# Patient Record
Sex: MALE | Race: WHITE
[De-identification: names, ages, dates, MRNs, and addresses within clinical notes are randomized per-mention and may not be internally consistent; named-entity substitution may affect disease eponyms.]

---

## 2017-04-25 ENCOUNTER — HOSPITAL ENCOUNTER (INPATIENT)
Age: 62
LOS: 3 days | Discharge: HOME | DRG: 189 | End: 2017-04-28
Payer: MEDICAID

## 2017-04-25 DIAGNOSIS — J96.00: Primary | ICD-10-CM

## 2017-04-25 DIAGNOSIS — J20.9: ICD-10-CM

## 2017-04-25 DIAGNOSIS — Z99.81: ICD-10-CM

## 2017-04-25 DIAGNOSIS — Z80.1: ICD-10-CM

## 2017-04-25 DIAGNOSIS — J18.1: ICD-10-CM

## 2017-04-25 DIAGNOSIS — J44.0: ICD-10-CM

## 2017-04-25 DIAGNOSIS — I10: ICD-10-CM

## 2017-04-25 DIAGNOSIS — Z79.899: ICD-10-CM

## 2017-04-25 DIAGNOSIS — Z79.891: ICD-10-CM

## 2017-04-25 DIAGNOSIS — M51.36: ICD-10-CM

## 2017-04-25 DIAGNOSIS — J44.1: ICD-10-CM

## 2017-04-25 DIAGNOSIS — J98.9: ICD-10-CM

## 2017-04-25 DIAGNOSIS — F17.210: ICD-10-CM

## 2017-05-26 ENCOUNTER — HOSPITAL ENCOUNTER (OUTPATIENT)
Age: 62
Discharge: HOME | End: 2017-05-26
Payer: MEDICAID

## 2017-05-26 DIAGNOSIS — J44.9: Primary | ICD-10-CM

## 2017-06-22 ENCOUNTER — HOSPITAL ENCOUNTER (OUTPATIENT)
Dept: HOSPITAL 76 - LAB.F | Age: 62
Discharge: HOME | End: 2017-06-22
Attending: NURSE PRACTITIONER
Payer: MEDICAID

## 2017-06-22 DIAGNOSIS — M25.551: Primary | ICD-10-CM

## 2017-06-22 PROCEDURE — 87640 STAPH A DNA AMP PROBE: CPT

## 2017-06-22 PROCEDURE — 87081 CULTURE SCREEN ONLY: CPT

## 2017-07-10 ENCOUNTER — HOSPITAL ENCOUNTER (INPATIENT)
Dept: HOSPITAL 76 - MS | Age: 62
LOS: 9 days | Discharge: NURSING, REHAB OR LONG-TERM CARE | DRG: 469 | End: 2017-07-19
Attending: INTERNAL MEDICINE | Admitting: ORTHOPAEDIC SURGERY
Payer: MEDICAID

## 2017-07-10 DIAGNOSIS — I26.99: ICD-10-CM
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
## 2017-07-10 DIAGNOSIS — Z79.1: ICD-10-CM

## 2017-07-10 DIAGNOSIS — Z87.891: ICD-10-CM

## 2017-07-10 DIAGNOSIS — E87.5: ICD-10-CM

## 2017-07-10 DIAGNOSIS — M16.11: Primary | ICD-10-CM

## 2017-07-10 DIAGNOSIS — J44.1: ICD-10-CM

## 2017-07-10 DIAGNOSIS — T81.718A: ICD-10-CM
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
## 2017-07-10 DIAGNOSIS — E66.9: ICD-10-CM

## 2017-07-10 DIAGNOSIS — G89.29: ICD-10-CM

## 2017-07-10 DIAGNOSIS — N17.9: ICD-10-CM

## 2017-07-10 DIAGNOSIS — M54.9: ICD-10-CM

## 2017-07-10 DIAGNOSIS — J96.02: ICD-10-CM

## 2017-07-10 DIAGNOSIS — I10: ICD-10-CM

## 2017-07-10 DIAGNOSIS — F41.9: ICD-10-CM

## 2017-07-10 DIAGNOSIS — J96.01: ICD-10-CM

## 2017-07-10 LAB
BASOPHILS NFR BLD AUTO: 0.1 10^3/UL (ref 0–0.1)
BASOPHILS NFR BLD AUTO: 0.1 10^3/UL (ref 0–0.1)
BASOPHILS NFR BLD AUTO: 0.5 %
BASOPHILS NFR BLD AUTO: 0.9 %
EOSINOPHIL # BLD AUTO: 0.1 10^3/UL (ref 0–0.7)
EOSINOPHIL # BLD AUTO: 0.2 10^3/UL (ref 0–0.7)
EOSINOPHIL NFR BLD AUTO: 1.2 %
EOSINOPHIL NFR BLD AUTO: 3 %
ERYTHROCYTE [DISTWIDTH] IN BLOOD BY AUTOMATED COUNT: 14.2 % (ref 12–15)
ERYTHROCYTE [DISTWIDTH] IN BLOOD BY AUTOMATED COUNT: 14.5 % (ref 12–15)
HCT VFR BLD AUTO: 37.2 % (ref 42–52)
HCT VFR BLD AUTO: 37.6 % (ref 42–52)
HGB UR QL STRIP: 12.2 G/DL (ref 14–18)
HGB UR QL STRIP: 12.9 G/DL (ref 14–18)
LYMPHOCYTES # SPEC AUTO: 1.2 10^3/UL (ref 1.5–3.5)
LYMPHOCYTES # SPEC AUTO: 1.5 10^3/UL (ref 1.5–3.5)
LYMPHOCYTES NFR BLD AUTO: 11.7 %
LYMPHOCYTES NFR BLD AUTO: 22.9 %
MCH RBC QN AUTO: 31.8 PG (ref 27–31)
MCH RBC QN AUTO: 32.2 PG (ref 27–31)
MCHC RBC AUTO-ENTMCNC: 32.9 G/DL (ref 32–36)
MCHC RBC AUTO-ENTMCNC: 34.4 G/DL (ref 32–36)
MCV RBC AUTO: 93.8 FL (ref 80–94)
MCV RBC AUTO: 96.8 FL (ref 80–94)
MONOCYTES # BLD AUTO: 0.8 10^3/UL (ref 0–1)
MONOCYTES # BLD AUTO: 1 10^3/UL (ref 0–1)
MONOCYTES NFR BLD AUTO: 12.4 %
MONOCYTES NFR BLD AUTO: 9.6 %
NEUTROPHILS # BLD AUTO: 3.9 10^3/UL (ref 1.5–6.6)
NEUTROPHILS # BLD AUTO: 8.1 10^3/UL (ref 1.5–6.6)
NEUTROPHILS # SNV AUTO: 10.5 X10^3/UL (ref 4.8–10.8)
NEUTROPHILS # SNV AUTO: 6.4 X10^3/UL (ref 4.8–10.8)
NEUTROPHILS NFR BLD AUTO: 60.8 %
NEUTROPHILS NFR BLD AUTO: 77 %
NRBC # BLD AUTO: 0.1 /100WBC
NRBC # BLD AUTO: 0.1 /100WBC
PDW BLD AUTO: 7.6 FL (ref 7.4–11.4)
PDW BLD AUTO: 7.8 FL (ref 7.4–11.4)
RBC MAR: 3.84 10^6/UL (ref 4.7–6.1)
RBC MAR: 4.01 10^6/UL (ref 4.7–6.1)
WBC # BLD: 10.5 X10^3/UL
WBC # BLD: 6.4 X10^3/UL

## 2017-07-10 PROCEDURE — 71010: CPT

## 2017-07-10 PROCEDURE — 83735 ASSAY OF MAGNESIUM: CPT

## 2017-07-10 PROCEDURE — 36415 COLL VENOUS BLD VENIPUNCTURE: CPT

## 2017-07-10 PROCEDURE — 85025 COMPLETE CBC W/AUTO DIFF WBC: CPT

## 2017-07-10 PROCEDURE — 87150 DNA/RNA AMPLIFIED PROBE: CPT

## 2017-07-10 PROCEDURE — 82570 ASSAY OF URINE CREATININE: CPT

## 2017-07-10 PROCEDURE — 82330 ASSAY OF CALCIUM: CPT

## 2017-07-10 PROCEDURE — 82803 BLOOD GASES ANY COMBINATION: CPT

## 2017-07-10 PROCEDURE — 85379 FIBRIN DEGRADATION QUANT: CPT

## 2017-07-10 PROCEDURE — 84300 ASSAY OF URINE SODIUM: CPT

## 2017-07-10 PROCEDURE — 83605 ASSAY OF LACTIC ACID: CPT

## 2017-07-10 PROCEDURE — 0SR902Z REPLACEMENT OF RIGHT HIP JOINT WITH METAL ON POLYETHYLENE SYNTHETIC SUBSTITUTE, OPEN APPROACH: ICD-10-PCS | Performed by: ORTHOPAEDIC SURGERY

## 2017-07-10 PROCEDURE — 94660 CPAP INITIATION&MGMT: CPT

## 2017-07-10 PROCEDURE — 87086 URINE CULTURE/COLONY COUNT: CPT

## 2017-07-10 PROCEDURE — 76770 US EXAM ABDO BACK WALL COMP: CPT

## 2017-07-10 PROCEDURE — 81003 URINALYSIS AUTO W/O SCOPE: CPT

## 2017-07-10 PROCEDURE — 84100 ASSAY OF PHOSPHORUS: CPT

## 2017-07-10 PROCEDURE — 80053 COMPREHEN METABOLIC PANEL: CPT

## 2017-07-10 PROCEDURE — 83880 ASSAY OF NATRIURETIC PEPTIDE: CPT

## 2017-07-10 PROCEDURE — 81001 URINALYSIS AUTO W/SCOPE: CPT

## 2017-07-10 PROCEDURE — 94640 AIRWAY INHALATION TREATMENT: CPT

## 2017-07-10 PROCEDURE — 85610 PROTHROMBIN TIME: CPT

## 2017-07-10 PROCEDURE — 80048 BASIC METABOLIC PNL TOTAL CA: CPT

## 2017-07-10 PROCEDURE — 84484 ASSAY OF TROPONIN QUANT: CPT

## 2017-07-10 PROCEDURE — 36600 WITHDRAWAL OF ARTERIAL BLOOD: CPT

## 2017-07-10 PROCEDURE — 93306 TTE W/DOPPLER COMPLETE: CPT

## 2017-07-10 PROCEDURE — 71275 CT ANGIOGRAPHY CHEST: CPT

## 2017-07-10 RX ADMIN — SODIUM CHLORIDE SCH MLS/HR: 4.5 INJECTION, SOLUTION INTRAVENOUS at 18:31

## 2017-07-10 RX ADMIN — SODIUM CHLORIDE, PRESERVATIVE FREE SCH: 5 INJECTION INTRAVENOUS at 22:28

## 2017-07-10 RX ADMIN — HYDROCODONE BITARTRATE AND ACETAMINOPHEN PRN TAB: 5; 325 TABLET ORAL at 23:59

## 2017-07-10 RX ADMIN — CEFAZOLIN SODIUM SCH MLS/HR: 2 SOLUTION INTRAVENOUS at 20:14

## 2017-07-10 RX ADMIN — GABAPENTIN SCH MG: 300 CAPSULE ORAL at 20:14

## 2017-07-10 RX ADMIN — HYDROCODONE BITARTRATE AND ACETAMINOPHEN PRN TAB: 5; 325 TABLET ORAL at 20:14

## 2017-07-10 RX ADMIN — IPRATROPIUM BROMIDE AND ALBUTEROL SULFATE SCH ML: 2.5; .5 SOLUTION RESPIRATORY (INHALATION) at 20:00

## 2017-07-10 NOTE — OPERATIVE REPORT
Operative Report





- General


Admit Date: 07/10/17


Procedure Date: 07/10/17


Planned Procedure: Right Total Hip Arthroplasty via Anterolateral Approach


Pre-Op Diagnosis: Right Hip Osteoarthritis


Post Op Diagnosis: Same





- Procedure Note


Primary Surgeon: Sandeep Almeida MD


Anesthesia Provider: MD Marisol


Anesthesia Technique: Local, Moderate sedation, Spinal


Pathology: 





Same


Estimated Blood Loss (in cc): 300


Complications: 





None.





- Other


Other Information/Narrative: 





Implants:


   Claudia Taperloc


      Taperloc Femoral Stem, Standard Offset, Size 12 x 148 mm, porous coated.


      58 mm porous coated Acetabular Shell


      UHMWPE Acetabular Cup, 40 mm ID.


      Screws: 6.5 x 25 mm


                  6.5 x 20 mm


       Chrome Cobalt Femoral Head, +3 x 40 mm OD.


Fluids: 1750 mL LR


Urine: 250 mL


Condition: Stable


Disposition: PACU >> MedSurg

## 2017-07-10 NOTE — XRAY REPORT
EXAM:

RIGHT HIP AND PELVIS RADIOGRAPHY

 

EXAM DATE: 7/10/2017 05:40 PM.

 

HISTORY: Status post Right Total Hip Arthroplasty.

 

COMPARISONS: None.

 

TECHNIQUE: 1 view of the pelvis and 1 view of the hip. Iliac crests not included. Suboptimal detail d
ue to portable technique.

 

FINDINGS: 

Bones: Grossly unremarkable. No definite fracture.

 

Joints: Right total hip prosthesis in anatomic alignment. No abnormal lucency associated with the pro
sthesis. Left hip joint not well visualized.

 

Soft Tissues: Unremarkable.

 

IMPRESSION: Status post right THR.

 

[facility]

Referring Provider Line: [phone]

 

SITE ID: [id number]

## 2017-07-10 NOTE — CONSULTATION NOTE
DATE OF CONSULTATION:  07/10/2017 00:00:00

 

REQUESTING PROVIDER:  Dr. Sandeep Almeida, Orthopaedic Surgery. 

 

PRIMARY CARE PROVIDER: VALERIE Kramer.

 

CHIEF COMPLAINT: Right total hip replacement. 

 

HISTORY OF PRESENT ILLNESS: This is a 62-year-old male whose past medical history is significant for 
chronic obstructive pulmonary disease. He does use some inhalers at home, is not O2 dependent. He may
 have MAURICIO, but he has not been evaluated and had the study for this as of yet. He did quit smoking 3 
months ago. Alcohol none. He was hospitalized here for a chronic obstructive pulmonary disease exacer
bation 2017. 

 

PAST MEDICAL HISTORY

1. Chronic back pain.

2. Right hip osteoarthritis.

3. History of hypertension.

4. History of chronic obstructive pulmonary disease, not O2 or steroid dependent. 

 

MEDICATIONS:

1. Ibuprofen 800 mg p.o. q. 8 hrs p.r.n.

2. Albuterol HFA MDI 1-2 puffs inhaled q. 4h p.r.n. wheezing.

3. Flexeril 10 mg p.o. t.i.d. p.r.n. muscle spasm.

4. BREO/ELLIPTA 100/25, 1 puff inhaled daily.

5. Gabapentin 300 mg p.o. b.i.d.

6. Vicodin 5/325, 1 tablet p.o. q. 4 hours p.r.n. pain.

7. Hydroxyzine 50 mg p.o. q. 4 hours p.r.n. allergies.

8. Lisinopril 40 mg p.o. daily. 

 

ALLERGIES: NO KNOWN DRUG ALLERGIES. 

 

SOCIAL HISTORY: Alcohol none, smoking-quit 3 months ago, was a 2-pack per day smoker. 

 

FAMILY MEDICAL HISTORY: Positive for lung cancer in mom, alcoholism and liver failure in his father, 
both  in their 50's. 

 

REVIEW OF SYSTEMS: Denies any fever or chills, denies cough. Denies any chest pain. All other review 
of systems are reviewed and are negative except for as in the HPI. 

 

PHYSICAL EXAMINATION:

VITAL SIGNS: Temperature is afebrile, heart rate 79, blood pressure 115/80, respiratory rate 16, 2 li
ters nasal cannula O2 saturation 95%.

CONSTITUTIONAL: Middle aged male in no acute distress. He is obese with a weight of 115.7 kg.

HEENT: Head normocephalic, atraumatic. Eyes PERRLA-DC, EOMI. Mouth no lesions.

NECK: No adenopathy. Carotids 2+/4 without bruits.

CHEST: Chest reveals some scattered expiratory wheezes.

COR: Regular rate and rhythm. S1 and S2 without murmur.

ABDOMEN: Soft, nontender. Bowel sounds present.

EXTREMITIES: Extremity exam reveals SCD's are present.

PSYCH: Mood and affect are appropriate.

SKIN: Skin reveals no rashes.

NEURO: Alert and oriented x3. Motor strength is intact bilaterally. 

 

LABS: White count 6.4, hematocrit 37.6, MCV 93.8, platelets 267, with poly's 3.9. 

 

ASSESSMENT AND PLAN:

1. Postoperative right hip replacement, postop day 0. Further recommendations per orthopedic surgery.


2. History of chronic obstructive pulmonary disease, currently stable. Will resume inhalers. Will als
o order Albuterol nebulizers p.r.n. and Duoneb's q.i.d.

3. Hypertension, essential, present on admission. Continue with his home medication regimen.

4. DVT prophylaxis. Further recommendations per orthopaedic surgery. 

 

TIME SPENT: 60 minutes.

 

 

 

DD:07/10/2017 18:37:00  DT: 07/10/2017 19:21  JOB #: [id number]  EXT JOB #:[id number]

## 2017-07-10 NOTE — XRAY PRELIMINARY REPORT
Accession: [id number]

Exam: XR Hip w/Pelvis 2-3V RT

 

IMPRESSION: Status post right THR.

 

[facility]

 

SITE ID: [id number]

## 2017-07-11 LAB
ALBUMIN/GLOB SERPL: 1.1 {RATIO} (ref 1–2.2)
ANION GAP SERPL CALCULATED.4IONS-SCNC: 6 MMOL/L (ref 6–13)
ANION GAP SERPL CALCULATED.4IONS-SCNC: 8 MMOL/L (ref 6–13)
BASOPHILS NFR BLD AUTO: 0.1 10^3/UL (ref 0–0.1)
BASOPHILS NFR BLD AUTO: 0.1 10^3/UL (ref 0–0.1)
BASOPHILS NFR BLD AUTO: 0.6 %
BASOPHILS NFR BLD AUTO: 0.7 %
BILIRUB BLD-MCNC: 0.6 MG/DL (ref 0.2–1)
BUN SERPL-MCNC: 30 MG/DL (ref 6–20)
BUN SERPL-MCNC: 47 MG/DL (ref 6–20)
CA-I SERPL ISE-MCNC: 1.12 MMOL/L (ref 1.15–1.33)
CALCIUM UR-MCNC: 7.9 MG/DL (ref 8.5–10.3)
CALCIUM UR-MCNC: 8.5 MG/DL (ref 8.5–10.3)
CHLORIDE SERPL-SCNC: 99 MMOL/L (ref 101–111)
CHLORIDE SERPL-SCNC: 99 MMOL/L (ref 101–111)
CO2 SERPL-SCNC: 29 MMOL/L (ref 21–32)
CO2 SERPL-SCNC: 30 MMOL/L (ref 21–32)
CREAT SERPLBLD-SCNC: 1 MG/DL (ref 0.6–1.2)
CREAT SERPLBLD-SCNC: 2.2 MG/DL (ref 0.6–1.2)
CUL URINE ADD CHARGE: (no result)
EOSINOPHIL # BLD AUTO: 0.1 10^3/UL (ref 0–0.7)
EOSINOPHIL # BLD AUTO: 0.1 10^3/UL (ref 0–0.7)
EOSINOPHIL NFR BLD AUTO: 1.1 %
EOSINOPHIL NFR BLD AUTO: 1.1 %
ERYTHROCYTE [DISTWIDTH] IN BLOOD BY AUTOMATED COUNT: 14.5 % (ref 12–15)
ERYTHROCYTE [DISTWIDTH] IN BLOOD BY AUTOMATED COUNT: 14.5 % (ref 12–15)
GFRSERPLBLD MDRD-ARVRAT: 30 ML/MIN/{1.73_M2} (ref 89–?)
GFRSERPLBLD MDRD-ARVRAT: 75 ML/MIN/{1.73_M2} (ref 89–?)
GLOBULIN SER-MCNC: 2.8 G/DL (ref 2.1–4.2)
GLUCOSE SERPL-MCNC: 149 MG/DL (ref 70–100)
GLUCOSE SERPL-MCNC: 152 MG/DL (ref 70–100)
HCT VFR BLD AUTO: 32.1 % (ref 42–52)
HCT VFR BLD AUTO: 35.1 % (ref 42–52)
HGB UR QL STRIP: 10.8 G/DL (ref 14–18)
HGB UR QL STRIP: 11.8 G/DL (ref 14–18)
LYMPHOCYTES # SPEC AUTO: 0.8 10^3/UL (ref 1.5–3.5)
LYMPHOCYTES # SPEC AUTO: 0.8 10^3/UL (ref 1.5–3.5)
LYMPHOCYTES NFR BLD AUTO: 9.1 %
LYMPHOCYTES NFR BLD AUTO: 9.8 %
MAGNESIUM SERPL-MCNC: 2 MG/DL (ref 1.7–2.8)
MCH RBC QN AUTO: 32.5 PG (ref 27–31)
MCH RBC QN AUTO: 32.8 PG (ref 27–31)
MCHC RBC AUTO-ENTMCNC: 33.5 G/DL (ref 32–36)
MCHC RBC AUTO-ENTMCNC: 33.5 G/DL (ref 32–36)
MCV RBC AUTO: 96.8 FL (ref 80–94)
MCV RBC AUTO: 97.9 FL (ref 80–94)
MONOCYTES # BLD AUTO: 0.7 10^3/UL (ref 0–1)
MONOCYTES # BLD AUTO: 1 10^3/UL (ref 0–1)
MONOCYTES NFR BLD AUTO: 11.1 %
MONOCYTES NFR BLD AUTO: 9.7 %
NEUTROPHILS # BLD AUTO: 6.1 10^3/UL (ref 1.5–6.6)
NEUTROPHILS # BLD AUTO: 6.9 10^3/UL (ref 1.5–6.6)
NEUTROPHILS # SNV AUTO: 7.7 X10^3/UL (ref 4.8–10.8)
NEUTROPHILS # SNV AUTO: 8.9 X10^3/UL (ref 4.8–10.8)
NEUTROPHILS NFR BLD AUTO: 78.1 %
NEUTROPHILS NFR BLD AUTO: 78.7 %
NRBC # BLD AUTO: 0 /100WBC
NRBC # BLD AUTO: 0.1 /100WBC
PDW BLD AUTO: 7.8 FL (ref 7.4–11.4)
PDW BLD AUTO: 8.1 FL (ref 7.4–11.4)
PH BLDV: 7.25 [PH] (ref 7.31–7.41)
PH UR STRIP.AUTO: 5.5 PH (ref 5–7.5)
PHOSPHATE BLD-MCNC: 5.8 MG/DL (ref 2.5–4.6)
POTASSIUM SERPL-SCNC: 5.1 MMOL/L (ref 3.5–5)
POTASSIUM SERPL-SCNC: 5.8 MMOL/L (ref 3.5–5)
PROT SPEC-MCNC: 6 G/DL (ref 6.7–8.2)
RBC MAR: 3.32 10^6/UL (ref 4.7–6.1)
RBC MAR: 3.59 10^6/UL (ref 4.7–6.1)
SODIUM SERPLBLD-SCNC: 135 MMOL/L (ref 135–145)
SODIUM SERPLBLD-SCNC: 136 MMOL/L (ref 135–145)
SP GR UR STRIP.AUTO: 1.02 (ref 1–1.03)
UA CHARGE (STRIP ONLY): YES
UA W/ MICROSCOPIC CHARGE: (no result)
UR CULTURE IF IND: (no result)
UROBILINOGEN UR STRIP.AUTO-MCNC: NEGATIVE MG/DL
WBC # BLD: 7.7 X10^3/UL
WBC # BLD: 8.9 X10^3/UL

## 2017-07-11 RX ADMIN — SODIUM CHLORIDE, PRESERVATIVE FREE SCH ML: 5 INJECTION INTRAVENOUS at 16:25

## 2017-07-11 RX ADMIN — OXYCODONE PRN MG: 5 TABLET ORAL at 14:00

## 2017-07-11 RX ADMIN — SODIUM CHLORIDE SCH: 4.5 INJECTION, SOLUTION INTRAVENOUS at 16:28

## 2017-07-11 RX ADMIN — OXYCODONE PRN MG: 5 TABLET ORAL at 18:52

## 2017-07-11 RX ADMIN — CEFAZOLIN SODIUM SCH: 2 SOLUTION INTRAVENOUS at 06:22

## 2017-07-11 RX ADMIN — IPRATROPIUM BROMIDE AND ALBUTEROL SULFATE SCH ML: 2.5; .5 SOLUTION RESPIRATORY (INHALATION) at 15:10

## 2017-07-11 RX ADMIN — IPRATROPIUM BROMIDE AND ALBUTEROL SULFATE SCH ML: 2.5; .5 SOLUTION RESPIRATORY (INHALATION) at 20:22

## 2017-07-11 RX ADMIN — ACETAMINOPHEN SCH: 500 TABLET ORAL at 16:46

## 2017-07-11 RX ADMIN — OXYCODONE PRN MG: 5 TABLET ORAL at 23:56

## 2017-07-11 RX ADMIN — ACETAMINOPHEN SCH MG: 500 TABLET ORAL at 12:04

## 2017-07-11 RX ADMIN — SODIUM CHLORIDE, PRESERVATIVE FREE SCH: 5 INJECTION INTRAVENOUS at 13:59

## 2017-07-11 RX ADMIN — IPRATROPIUM BROMIDE AND ALBUTEROL SULFATE SCH ML: 2.5; .5 SOLUTION RESPIRATORY (INHALATION) at 11:26

## 2017-07-11 RX ADMIN — DOCUSATE SODIUM SCH: 100 CAPSULE, LIQUID FILLED ORAL at 20:09

## 2017-07-11 RX ADMIN — DOCUSATE SODIUM SCH MG: 100 CAPSULE, LIQUID FILLED ORAL at 10:31

## 2017-07-11 RX ADMIN — IPRATROPIUM BROMIDE AND ALBUTEROL SULFATE SCH ML: 2.5; .5 SOLUTION RESPIRATORY (INHALATION) at 07:43

## 2017-07-11 RX ADMIN — GABAPENTIN SCH MG: 300 CAPSULE ORAL at 09:32

## 2017-07-11 RX ADMIN — KETOROLAC TROMETHAMINE PRN MG: 30 INJECTION, SOLUTION INTRAMUSCULAR at 16:25

## 2017-07-11 RX ADMIN — SODIUM CHLORIDE SCH MLS/HR: 4.5 INJECTION, SOLUTION INTRAVENOUS at 09:33

## 2017-07-11 RX ADMIN — SODIUM CHLORIDE, PRESERVATIVE FREE SCH: 5 INJECTION INTRAVENOUS at 06:22

## 2017-07-11 RX ADMIN — GABAPENTIN SCH MG: 300 CAPSULE ORAL at 20:16

## 2017-07-11 RX ADMIN — KETOROLAC TROMETHAMINE PRN MG: 30 INJECTION, SOLUTION INTRAMUSCULAR at 10:32

## 2017-07-11 RX ADMIN — ENOXAPARIN SODIUM SCH MG: 100 INJECTION SUBCUTANEOUS at 09:32

## 2017-07-11 NOTE — XRAY REPORT
EXAM: 

CHEST RADIOGRAPHY

 

EXAM DATE: 7/11/2017 09:01 PM.

 

CLINICAL HISTORY: Hypoxia, hypotension .

 

COMPARISON: 05/26/2017.

 

TECHNIQUE: 1 view.

 

FINDINGS:

Lungs/Pleura: Mild interstitial prominence. No definite localized infiltrate, consolidation, effusion
, or pneumothorax. Right costophrenic angle incompletely seen.

 

Mediastinum: Within exam limitations, cardiomediastinal contour is normal. Upper lobe vessels not dis
tended.

 

Other: Osteopenia, degenerative changes.

 

IMPRESSION: No acute disease.

 

[facility]

Referring Provider Line: [phone]

 

SITE ID: [id number]

## 2017-07-11 NOTE — XRAY PRELIMINARY REPORT
Accession: [id number]

Exam: XR Chest 1 View

 

IMPRESSION: No acute disease.

 

[facility]

 

SITE ID: [id number]

## 2017-07-11 NOTE — PROVIDER PROGRESS NOTE
Subjective





- Prog Note Date


Prog Note Date: 07/11/17


Prog Note Time: 11:08





- Subjective


Subjective: 





POD 1, awake, alert, no distress but seems anxious.  He reports pain in the 

right hip - "about the same as before surgery but different type".  Pain worse 

with trying to move around but feels stiff and nervous about trying to get OOB 

today. No distal or radiating pain. He denies SOB, no chest or abdominal pain.  

He is eating well, no nausea.  No BM since surgery.





Current Medications





- Current Medications


Current Medications: 








Active Medications





Acetaminophen (Tylenol)  1,000 mg PO TIDWM [facility]


Albuterol ()  2.5 mg INH Q4H PRN


   PRN Reason: Shortness of Air/Wheezing


Albuterol/Ipratropium (Duoneb)  3 ml INH RTQID [facility]


   Last Admin: 07/11/17 07:43 Dose:  3 ml


Bisacodyl (Dulcolax Supp)  10 mg MO Q12H PRN


   PRN Reason: Constipation


Bisacodyl (Dulcolax)  10 mg PO Q12H PRN


   PRN Reason: Constipation


Cyclobenzaprine HCl (Flexeril)  10 mg PO TID PRN


   PRN Reason: Spasms


Diphenhydramine HCl (Benadryl)  25 mg PO Q6H PRN


   PRN Reason: ITCHING


Diphenhydramine HCl (Benadryl Inj)  25 mg IVP Q6H PRN


   PRN Reason: ITCHING


Docusate Sodium (Colace 100mg Capsule)  100 mg PO BID [facility]


   Last Admin: 07/11/17 10:31 Dose:  100 mg


Enoxaparin Sodium (Lovenox)  40 mg SUBQ DAILY [facility]


   Last Admin: 07/11/17 09:32 Dose:  40 mg


Gabapentin (Neurontin)  300 mg PO BID [facility]


   Last Admin: 07/11/17 09:32 Dose:  300 mg


Hydroxyzine Pamoate (Vistaril)  50 mg PO Q4H PRN


   PRN Reason: Anxiety


Sodium Chloride (Normal Saline 0.45%)  1,000 mls @ 100 mls/hr IV .Q10H [facility]


   Last Admin: 07/11/17 09:33 Dose:  100 mls/hr


Ketorolac Tromethamine (Toradol Inj)  30 mg IVP Q6HR PRN


   PRN Reason: PAIN


   Stop: 07/15/17 17:06


   Last Admin: 07/11/17 10:32 Dose:  30 mg


Lisinopril (Zestril)  40 mg PO DAILY [facility]


Non-Formulary Medication (Fluticasone/Vilanterol [Breo Ellipta 100-25 Mcg Inh])

  1 puffs INH RTDAILY [facility]


   Last Admin: 07/11/17 10:32 Dose:  1 puffs


Ondansetron HCl (Zofran Inj)  4 mg IVP Q6HR PRN


   PRN Reason: Nausea / Vomiting


Prochlorperazine Edisylate (Compazine Inj)  10 mg IVP Q6HR PRN


   PRN Reason: Nausea / Vomiting


Senna (Senokot)  17.2 mg PO QPM [facility]


Sodium Chloride (Normal Saline Flush 0.9%)  10 ml IVP PRN PRN


   PRN Reason: AS NEEDED PER PROVIDER ORDERS


Sodium Chloride (Normal Saline Flush 0.9%)  10 ml IVP Q8HR [facility]


   Last Admin: 07/11/17 06:22 Dose:  Not Given





 





Ibuprofen 800 mg PO Q8H PRN 04/25/17 


Lisinopril 40 mg PO DAILY 04/25/17 


Hydroxyzine Pamoate [Vistaril] 50 mg PO Q4H PRN MDD 4 caps 06/27/17 


Cyclobenzaprine [Flexeril] 10 mg PO TID PRN 07/10/17 


Fluticasone/Vilanterol [Breo Ellipta 100-25 Mcg INH] 1 puffs INH DAILY 07/10/17 


Gabapentin [Neurontin] 300 mg PO BID 07/10/17 


HYDROcod/ACETAM 5/325 [Norco 5/325] 1 tab PO Q4H PRN 07/10/17 











Objective





- Vital Signs/Intake & Output


Reviewed Vital Signs: Yes


Vital Signs: 





 Vital Signs x48h











  Temp Pulse Resp BP Pulse Ox


 


 07/11/17 08:59  36.7 C  103 H  18  118/75  94


 


 07/11/17 06:18  36.9 C  93  20  111/77  95











Intake & Output: 





 Intake & Output











 07/08/17 07/09/17 07/10/17 07/11/17





 23:59 23:59 23:59 23:59


 


Intake Total   2485 750


 


Output Total   350 300


 


Balance   2135 450














- Objective


General Appearance: positive: No acute distress, Alert, Anxious (hypervrbal and 

restless in the bed shiftiong weight wround continuously)


Eyes Bilateral: positive: Normal inspection


ENT: positive: No signs of dehydration


Neck: positive: No JVD


Respiratory: positive: Chest non-tender, No respiratory distress.  negative: 

Breath sounds nml (epiratory coarse rub, no wheezing. no increased WOB)


Cardiovascular: positive: Regular rate & rhythm


Peripheral Pulses: 1+ Dorsalis pedis (R), 1+ Dorsalis pedis (L), 2+ Radial (R), 

2+ Radial (L)


Abdomen: positive: Non-tender, Nml bowel sounds, No distention


Skin: positive: Color nml (farhad face and chest), No rash, Warm, Dry, Other (

right hip dressing CDI)


Extremities: positive: Other (Movign all extremities appropriately. Right hip 

ROM limited due ot pain, distal CMS intact)


Neurologic/Psychiatric: positive: Oriented x3, Motor nml, Sensation nml, Mood/

affect nml





- Lab Results


Fish Bones: 


 07/11/17 05:40





 07/11/17 05:15


Other Labs: 





 Lab Results x24hrs











  07/11/17 07/11/17 07/10/17 Range/Units





  05:40 05:15 18:26 


 


WBC  8.9   10.5  (4.8-10.8)  x10^3/uL


 


RBC  3.59 L   3.84 L  (4.70-6.10)  10^6/uL


 


Hgb  11.8 L   12.2 L  (14.0-18.0)  g/dL


 


Hct  35.1 L   37.2 L  (42.0-52.0)  %


 


MCV  97.9 H   96.8 H  (80.0-94.0)  fL


 


MCH  32.8 H   31.8 H  (27.0-31.0)  pg


 


MCHC  33.5   32.9  (32.0-36.0)  g/dL


 


RDW  14.5   14.5  (12.0-15.0)  %


 


Plt Count  221   253  (130-450)  10^3/uL


 


MPV  7.8   7.8  (7.4-11.4)  fL


 


Neut #  6.9 H   8.1 H  (1.5-6.6)  10^3/uL


 


Lymph #  0.8 L   1.2 L  (1.5-3.5)  10^3/uL


 


Mono #  1.0   1.0  (0.0-1.0)  10^3/uL


 


Eos #  0.1   0.1  (0.0-0.7)  10^3/uL


 


Baso #  0.1   0.1  (0.0-0.1)  10^3/uL


 


Absolute Nucleated RBC  0.00   0.01  x10^3/uL


 


Nucleated RBCs  0.0   0.1  /100WBC


 


Sodium   135   (135-145)  mmol/L


 


Potassium   5.1 H   (3.5-5.0)  mmol/L


 


Chloride   99 L   (101-111)  mmol/L


 


Carbon Dioxide   30   (21-32)  mmol/L


 


Anion Gap   6.0   (6-13)  


 


BUN   30 H   (6-20)  mg/dL


 


Creatinine   1.0   (0.6-1.2)  mg/dL


 


Estimated GFR (MDRD)   75 L   (>89)  


 


Glucose   152 H   ()  mg/dL


 


Calcium   8.5   (8.5-10.3)  mg/dL














  07/10/17 Range/Units





  11:15 


 


WBC  6.4  (4.8-10.8)  x10^3/uL


 


RBC  4.01 L  (4.70-6.10)  10^6/uL


 


Hgb  12.9 L  (14.0-18.0)  g/dL


 


Hct  37.6 L  (42.0-52.0)  %


 


MCV  93.8  (80.0-94.0)  fL


 


MCH  32.2 H  (27.0-31.0)  pg


 


MCHC  34.4  (32.0-36.0)  g/dL


 


RDW  14.2  (12.0-15.0)  %


 


Plt Count  267  (130-450)  10^3/uL


 


MPV  7.6  (7.4-11.4)  fL


 


Neut #  3.9  (1.5-6.6)  10^3/uL


 


Lymph #  1.5  (1.5-3.5)  10^3/uL


 


Mono #  0.8  (0.0-1.0)  10^3/uL


 


Eos #  0.2  (0.0-0.7)  10^3/uL


 


Baso #  0.1  (0.0-0.1)  10^3/uL


 


Absolute Nucleated RBC  0.01  x10^3/uL


 


Nucleated RBCs  0.1  /100WBC


 


Sodium   (135-145)  mmol/L


 


Potassium   (3.5-5.0)  mmol/L


 


Chloride   (101-111)  mmol/L


 


Carbon Dioxide   (21-32)  mmol/L


 


Anion Gap   (6-13)  


 


BUN   (6-20)  mg/dL


 


Creatinine   (0.6-1.2)  mg/dL


 


Estimated GFR (MDRD)   (>89)  


 


Glucose   ()  mg/dL


 


Calcium   (8.5-10.3)  mg/dL














Assessment/Plan





- Problem List


(1) Status post total hip replacement, right


Impression: 


Hx of right hip OA and NSAID dependence. Now POD1 from elective R SANTY.  

Surgical site with mild swelling, no bleeding.  Pain present but controlled


   -WBAT


   -PT/OT eval and treat


   -Lovenox for DVT prophylaxis + foot pumps


   -Pain control: tylenol TID, lidoderm patch and bid gabapentin w/ prn toradol

, oxycodone and flexeril


   -Bowel regimen: colace and senna till 1st BM








(2) Moderate COPD (chronic obstructive pulmonary disease)


Impression: 


Hx of COPD on Breo/ellipita and albuterol at home.  On O2 overnight after 

surgery, now RA. No acute exacerbation. 


   -Continue Breo/ellipita, scheduled duonebs and prn albuterol


   -Supplemental O2 if needed for sats <90%


   -Nicotine patch prn cravings








(3) Anxiety


Impression: 


Anxiety, multifactorial - recently quit smoking, pain from surgery.


   -PRN vistaril


   -Control pain as noted above


   -Coaching on breathing techniques








(4) HTN (hypertension)


Impression: 


Hx of HTN, on lisinopril at home.  Normotensive currently.


   -Continue lisinopril with parameters


   -Monitor BP routinely and titrate meds as needed

## 2017-07-12 LAB
ABG ANALYSIS TIME: 1200
ABG ANALYSIS TIME: 1458
ABG ANALYSIS TIME: 1715
ABG EXPIRATORY POS AIRWAY P: 5 CMH2O
ABG EXPIRATORY POS AIRWAY P: 5 CMH2O
ABG INSPIRATORY POS AIRWAY P: 12 CMH2O
ABG INSPIRATORY POS AIRWAY P: 15 CMH2O
ABG O2 DEVICE: (no result)
ABG O2 DEVICE: (no result)
ABG PRESSURE SUPPORT VENT: 10 CMH2O
ABG SITE OF DRAW: (no result)
ALBUMIN/GLOB SERPL: 0.9 {RATIO} (ref 1–2.2)
ANION GAP SERPL CALCULATED.4IONS-SCNC: 4 MMOL/L (ref 6–13)
ANION GAP SERPL CALCULATED.4IONS-SCNC: 6 MMOL/L (ref 6–13)
ANION GAP SERPL CALCULATED.4IONS-SCNC: 7 MMOL/L (ref 6–13)
ARTERIAL PATENCY WRIST A: POSITIVE
BASE EXCESS BLDMV CALC-SCNC: -1 MMOL/L (ref -2–3)
BASE EXCESS BLDMV CALC-SCNC: 1 MMOL/L (ref -2–3)
BASE EXCESS BLDMV CALC-SCNC: 2 MMOL/L (ref -2–3)
BILIRUB BLD-MCNC: 0.6 MG/DL (ref 0.2–1)
BUN SERPL-MCNC: 42 MG/DL (ref 6–20)
BUN SERPL-MCNC: 46 MG/DL (ref 6–20)
BUN SERPL-MCNC: 48 MG/DL (ref 6–20)
CA-I SERPL ISE-MCNC: 1.17 MMOL/L (ref 1.15–1.33)
CALCIUM UR-MCNC: 8 MG/DL (ref 8.5–10.3)
CALCIUM UR-MCNC: 8.1 MG/DL (ref 8.5–10.3)
CALCIUM UR-MCNC: 8.2 MG/DL (ref 8.5–10.3)
CHLORIDE SERPL-SCNC: 102 MMOL/L (ref 101–111)
CHLORIDE SERPL-SCNC: 103 MMOL/L (ref 101–111)
CHLORIDE SERPL-SCNC: 104 MMOL/L (ref 101–111)
CO2 BLDA CALC-SCNC: 27 MMOL/L (ref 21–29)
CO2 BLDA CALC-SCNC: 29 MMOL/L (ref 21–29)
CO2 BLDA CALC-SCNC: 30 MMOL/L (ref 21–29)
CO2 SERPL-SCNC: 25 MMOL/L (ref 21–32)
CO2 SERPL-SCNC: 26 MMOL/L (ref 21–32)
CO2 SERPL-SCNC: 27 MMOL/L (ref 21–32)
CREAT SERPLBLD-SCNC: 1.1 MG/DL (ref 0.6–1.2)
CREAT SERPLBLD-SCNC: 1.3 MG/DL (ref 0.6–1.2)
CREAT SERPLBLD-SCNC: 1.7 MG/DL (ref 0.6–1.2)
DEPRECATED HCO3 PLAS-SCNC: 25.8 MMOL/L (ref 22–26)
DEPRECATED HCO3 PLAS-SCNC: 27.8 MMOL/L (ref 22–26)
DEPRECATED HCO3 PLAS-SCNC: 28.2 MMOL/L (ref 22–26)
GFRSERPLBLD MDRD-ARVRAT: 41 ML/MIN/{1.73_M2} (ref 89–?)
GFRSERPLBLD MDRD-ARVRAT: 56 ML/MIN/{1.73_M2} (ref 89–?)
GFRSERPLBLD MDRD-ARVRAT: 67 ML/MIN/{1.73_M2} (ref 89–?)
GLOBULIN SER-MCNC: 3.1 G/DL (ref 2.1–4.2)
GLUCOSE SERPL-MCNC: 145 MG/DL (ref 70–100)
GLUCOSE SERPL-MCNC: 146 MG/DL (ref 70–100)
GLUCOSE SERPL-MCNC: 220 MG/DL (ref 70–100)
INHALED O2 CONCENTRATION: 14 B/MIN
INHALED O2 FLOW RATE: 3 LPM
MAGNESIUM SERPL-MCNC: 2 MG/DL (ref 1.7–2.8)
PCO2 TEMP ADJ BLDCOA: 55 MMHG (ref 34–45)
PCO2 TEMP ADJ BLDCOA: 55 MMHG (ref 34–45)
PCO2 TEMP ADJ BLDCOA: 61 MMHG (ref 34–45)
PH BLDV: 7.3 [PH] (ref 7.31–7.41)
PH TEMP ADJ BLDA: 7.27 [PH] (ref 7.35–7.45)
PH TEMP ADJ BLDA: 7.28 [PH] (ref 7.35–7.45)
PH TEMP ADJ BLDA: 7.31 [PH] (ref 7.35–7.45)
PHOSPHATE BLD-MCNC: 2.3 MG/DL (ref 2.5–4.6)
PO2 TEMP ADJ BLDCOA: 112 MMHG (ref 80–100)
PO2 TEMP ADJ BLDCOA: 63 MMHG (ref 80–100)
PO2 TEMP ADJ BLDCOA: 98 MMHG (ref 80–100)
POTASSIUM SERPL-SCNC: 5.1 MMOL/L (ref 3.5–5)
POTASSIUM SERPL-SCNC: 5.7 MMOL/L (ref 3.5–5)
POTASSIUM SERPL-SCNC: 6 MMOL/L (ref 3.5–5)
PROT SPEC-MCNC: 6 G/DL (ref 6.7–8.2)
SAO2 % BLDA FROM PO2: 88 % (ref 94–98)
SAO2 % BLDA FROM PO2: 97 % (ref 94–98)
SAO2 % BLDA FROM PO2: 97 % (ref 94–98)
SAO2 % BLDA PULSEOX: 95 %
SODIUM SERPLBLD-SCNC: 134 MMOL/L (ref 135–145)
SODIUM SERPLBLD-SCNC: 134 MMOL/L (ref 135–145)
SODIUM SERPLBLD-SCNC: 136 MMOL/L (ref 135–145)
VENTILATION MODE VENT: (no result)

## 2017-07-12 RX ADMIN — CHLORHEXIDINE GLUCONATE SCH ML: 1.2 SOLUTION ORAL at 20:11

## 2017-07-12 RX ADMIN — IPRATROPIUM BROMIDE AND ALBUTEROL SULFATE SCH ML: 2.5; .5 SOLUTION RESPIRATORY (INHALATION) at 14:53

## 2017-07-12 RX ADMIN — LORAZEPAM PRN MG: 2 INJECTION, SOLUTION INTRAMUSCULAR; INTRAVENOUS at 15:31

## 2017-07-12 RX ADMIN — ENOXAPARIN SODIUM SCH MG: 100 INJECTION SUBCUTANEOUS at 08:41

## 2017-07-12 RX ADMIN — SODIUM CHLORIDE, PRESERVATIVE FREE SCH ML: 5 INJECTION INTRAVENOUS at 17:35

## 2017-07-12 RX ADMIN — LORAZEPAM PRN MG: 2 INJECTION, SOLUTION INTRAMUSCULAR; INTRAVENOUS at 20:11

## 2017-07-12 RX ADMIN — DOCUSATE SODIUM SCH: 100 CAPSULE, LIQUID FILLED ORAL at 20:51

## 2017-07-12 RX ADMIN — IPRATROPIUM BROMIDE AND ALBUTEROL SULFATE SCH ML: 2.5; .5 SOLUTION RESPIRATORY (INHALATION) at 20:45

## 2017-07-12 RX ADMIN — ACETAMINOPHEN SCH: 500 TABLET ORAL at 17:36

## 2017-07-12 RX ADMIN — SODIUM CHLORIDE, PRESERVATIVE FREE PRN ML: 5 INJECTION INTRAVENOUS at 12:36

## 2017-07-12 RX ADMIN — OXYCODONE PRN MG: 5 TABLET ORAL at 15:10

## 2017-07-12 RX ADMIN — ACETAMINOPHEN SCH MG: 500 TABLET ORAL at 19:43

## 2017-07-12 RX ADMIN — SODIUM CHLORIDE, PRESERVATIVE FREE SCH: 5 INJECTION INTRAVENOUS at 22:01

## 2017-07-12 RX ADMIN — OXYCODONE PRN MG: 5 TABLET ORAL at 04:32

## 2017-07-12 RX ADMIN — OXYCODONE PRN MG: 5 TABLET ORAL at 19:25

## 2017-07-12 RX ADMIN — SODIUM CHLORIDE, PRESERVATIVE FREE SCH ML: 5 INJECTION INTRAVENOUS at 05:48

## 2017-07-12 RX ADMIN — DOCUSATE SODIUM SCH: 100 CAPSULE, LIQUID FILLED ORAL at 08:40

## 2017-07-12 RX ADMIN — SODIUM CHLORIDE, PRESERVATIVE FREE PRN ML: 5 INJECTION INTRAVENOUS at 15:32

## 2017-07-12 RX ADMIN — SODIUM CHLORIDE SCH MLS/HR: 9 INJECTION, SOLUTION INTRAVENOUS at 17:35

## 2017-07-12 RX ADMIN — OXYCODONE PRN MG: 5 TABLET ORAL at 20:11

## 2017-07-12 RX ADMIN — GABAPENTIN SCH: 300 CAPSULE ORAL at 08:41

## 2017-07-12 RX ADMIN — IPRATROPIUM BROMIDE AND ALBUTEROL SULFATE SCH ML: 2.5; .5 SOLUTION RESPIRATORY (INHALATION) at 11:26

## 2017-07-12 RX ADMIN — ACETAMINOPHEN SCH MG: 500 TABLET ORAL at 12:36

## 2017-07-12 RX ADMIN — ACETAMINOPHEN SCH MG: 500 TABLET ORAL at 08:40

## 2017-07-12 RX ADMIN — IPRATROPIUM BROMIDE AND ALBUTEROL SULFATE SCH ML: 2.5; .5 SOLUTION RESPIRATORY (INHALATION) at 07:59

## 2017-07-12 RX ADMIN — GABAPENTIN SCH: 300 CAPSULE ORAL at 20:51

## 2017-07-12 NOTE — XRAY REPORT
FRONTAL CHEST:  07/12/2017 

 

CLINICAL INDICATION:  Respiratory failure. 

 

COMPARISON:  07/11/2017 

 

Frontal view of the chest demonstrates a normal cardiac silhouette.  Lung volumes are lower, producin
g crowding of the central pulmonary vasculature.  No focal consolidation, effusion, or pneumothorax i
s present. 

 

IMPRESSION:  LOW LUNG VOLUMES.  OTHERWISE, NO SIGNIFICANT INTERVAL CHANGE. 

 

 

 

DD:07/12/2017 13:36:51  DT: 07/12/2017 17:33  JOB #: [id number]  EXT JOB #:[id number]

## 2017-07-12 NOTE — PROVIDER PROGRESS NOTE
Subjective





- Prog Note Date


Prog Note Date: 07/12/17


Prog Note Time: 17:19





- Subjective


Pt reports feeling: Worse


Subjective: 





Patient was lethargic when first taken to the ICU. He was more alert after 

starting on BiPAP and was complaining of pain in his hip. Also stated he was 

uncomfortable with hi catheter as well as the BipAP mask. He denied any 

fever and stated he does not have chest pain nor does he feel short of breath. 





Current Medications





- Current Medications


Current Medications: 








Active Medications











Generic Name Dose Route Start Last Admin





  Trade Name Freq  PRN Reason Stop Dose Admin


 


Acetaminophen  1,000 mg 07/11/17 12:00 07/12/17 12:36





  Tylenol  PO   1,000 mg





  TIDWM SAM   Administration


 


Albuterol  2.5 mg 07/10/17 17:22  





    INH   





  Q4H PRN   





  Shortness of Air/Wheezing   


 


Albuterol/Ipratropium  3 ml 07/10/17 19:00 07/12/17 14:53





  Duoneb  INH   3 ml





  RTQID SAM   Administration


 


Bisacodyl  10 mg 07/10/17 17:00  





  Dulcolax Supp  [place]   





  Q12H PRN   





  Constipation   


 


Bisacodyl  10 mg 07/10/17 17:00  





  Dulcolax  PO   





  Q12H PRN   





  Constipation   


 


Chlorhexidine Gluconate  15 ml 07/12/17 21:00  





  Peridex  PO   





  BID SAM   


 


Cyclobenzaprine HCl  10 mg 07/10/17 17:05  





  Flexeril  PO   





  TID PRN   





  Spasms   


 


Diphenhydramine HCl  25 mg 07/10/17 17:00  





  Benadryl  PO   





  Q6H PRN   





  ITCHING   


 


Diphenhydramine HCl  25 mg 07/10/17 17:00  





  Benadryl Inj  IVP   





  Q6H PRN   





  ITCHING   


 


Docusate Sodium  100 mg 07/11/17 10:00 07/12/17 08:40





  Colace 100mg Capsule  PO   Not Given





  BID ASM   


 


Enoxaparin Sodium  40 mg 07/11/17 09:00 07/12/17 08:41





  Lovenox  SUBQ   40 mg





  DAILY SAM   Administration


 


Gabapentin  300 mg 07/10/17 21:00 07/12/17 08:41





  Neurontin  PO   Not Given





  BID SAM   


 


Hydroxyzine Pamoate  50 mg 07/10/17 17:05  





  Vistaril  PO   





  Q4H PRN   





  Anxiety   


 


Sodium Chloride  1,000 mls @ 125 mls/hr 07/12/17 17:00  





  Normal Saline 0.9%  IV   





  .Q8H SAM   


 


Lorazepam  0.5 mg 07/12/17 15:21 07/12/17 15:31





  Ativan Inj  IVP   0.5 mg





  Q2HR PRN   Administration





  Anxiety   


 


Losartan Potassium  50 mg 07/12/17 09:00 07/12/17 09:40





  Cozaar  PO   Not Given





  DAILY SAM   


 


Non-Formulary Medication  1 puffs 07/11/17 10:00 07/12/17 07:59





  Fluticasone/Vilanterol [Breo Ellipta 100-25 Mcg Inh]  INH   1 puffs





  RTDAILY SAM   Administration


 


Ondansetron HCl  4 mg 07/10/17 17:00  





  Zofran Inj  IVP   





  Q6HR PRN   





  Nausea / Vomiting   


 


Oxycodone HCl  5 - 10 mg 07/11/17 13:30 07/12/17 15:10





  Roxicodone  PO   5 mg





  Q4HR PRN   Administration





  PAIN   


 


Pantoprazole Sodium  40 mg 07/13/17 07:00  





  Protonix  IVP   





  QDAC SAM   


 


Prochlorperazine Edisylate  10 mg 07/10/17 17:00  





  Compazine Inj  IVP   





  Q6HR PRN   





  Nausea / Vomiting   


 


Senna  17.2 mg 07/12/17 10:22  





  Senokot  PO   





  BID PRN   





  CONSTIPATION   


 


Sodium Chloride  10 ml 07/10/17 17:00 07/12/17 15:32





  Normal Saline Flush 0.9%  IVP   10 ml





  PRN PRN   Administration





  AS NEEDED PER PROVIDER ORDERS   


 


Sodium Chloride  10 ml 07/10/17 22:00 07/12/17 05:48





  Normal Saline Flush 0.9%  IVP   10 ml





  Q8HR SAM   Administration








 





Ibuprofen 800 mg PO Q8H PRN 04/25/17 


Lisinopril 40 mg PO DAILY 04/25/17 


Hydroxyzine Pamoate [Vistaril] 50 mg PO Q4H PRN MDD 4 caps 06/27/17 


Cyclobenzaprine [Flexeril] 10 mg PO TID PRN 07/10/17 


Fluticasone/Vilanterol [Breo Ellipta 100-25 Mcg INH] 1 puffs INH DAILY 07/10/17 


Gabapentin [Neurontin] 300 mg PO BID 07/10/17 


HYDROcod/ACETAM 5/325 [Norco 5/325] 1 tab PO Q4H PRN 07/10/17 











Objective





- Vital Signs/Intake & Output


Reviewed Vital Signs: Yes


Vital Signs: 





 Vital Signs











  Pulse Pulse Pulse Resp BP Pulse Ox


 


 07/12/17 17:08  105 H     


 


 07/12/17 15:16  105 H     


 


 07/12/17 15:00  107 H   109 H  18  112/60  94


 


 07/12/17 14:30    109 H  17  120/60  99


 


 07/12/17 13:30   108 H   15  118/70 











Intake & Output: 





 Intake & Output











 07/09/17 07/10/17 07/11/17 07/12/17





 23:59 23:59 23:59 23:59


 


Intake Total  2485 3656 940


 


Output Total  350 1020 1835


 


Balance  2135 5616 -765














- Objective


General Appearance: positive: Alert, Mild distress (on BiPAP was previously 

lethargic and short of air but more stable now that he is on BiPAP)


Eyes Bilateral: positive: Normal inspection, PERRL, EOMI, No lid inflammation, 

Conjunctivae nml, No scleral icterus


ENT: positive: ENT inspection nml, Pharynx nml, Dry mucous membranes.  negative

: Purulent nasal drainage, Pharyngeal erythema, Oral lesions


Neck: positive: Nml inspection, Thyroid nml, No JVD, Trachea midline.  negative

: Thyromegaly, Lymphadenopathy (R), Lymphadenopathy (L), Carotid bruit, 

Tracheal deviation


Respiratory: positive: Chest non-tender, Rales (bases), Other (On BiPAP, 

tachypneic).  negative: Wheezes, Rhonchi


Cardiovascular: positive: No murmur, No gallop, Tachycardia


Abdomen: positive: Non-tender, No organomegaly, Other (Distended with sluggish 

bowel sounds).  negative: Guarding, Rebound, Hepatomegaly


Back: positive: Nml inspection.  negative: CVA tenderness (R), CVA tenderness (L

)


Skin: positive: Color nml, No rash, Warm.  negative: Cyanosis, Pallor


Extremities: positive: Non-tender, Full ROM, Nml appearance, Pedal edema (+1).  

negative: Calf tenderness


Neurologic/Psychiatric: positive: Oriented x3, CN's nml (2-12), Motor nml, 

Sensation nml, Mood/affect nml





- Lab Results


Fish Bones: 


 07/11/17 20:38





 07/12/17 12:20


Other Labs: 





 Lab Results x24hrs











  07/12/17 07/12/17 07/12/17 Range/Units





  16:48 14:48 13:35 


 


WBC     (4.8-10.8)  x10^3/uL


 


RBC     (4.70-6.10)  10^6/uL


 


Hgb     (14.0-18.0)  g/dL


 


Hct     (42.0-52.0)  %


 


MCV     (80.0-94.0)  fL


 


MCH     (27.0-31.0)  pg


 


MCHC     (32.0-36.0)  g/dL


 


RDW     (12.0-15.0)  %


 


Plt Count     (130-450)  10^3/uL


 


MPV     (7.4-11.4)  fL


 


Neut #     (1.5-6.6)  10^3/uL


 


Lymph #     (1.5-3.5)  10^3/uL


 


Mono #     (0.0-1.0)  10^3/uL


 


Eos #     (0.0-0.7)  10^3/uL


 


Baso #     (0.0-0.1)  10^3/uL


 


Absolute Nucleated RBC     x10^3/uL


 


Nucleated RBCs     /100WBC


 


D-Dimer  579.3 H    (200.0-255.0)  ng/mL


 


Bld Gas Analysis Time   1458   


 


Sample Site   RIGHT RADIAL   


 


ABG pH   7.27 L   (7.35-7.45)  


 


ABG pCO2   61 H*   (34-45)  mmHg


 


ABG pO2   112 H   ()  mmHg


 


ABG HCO3   28.2 H   (22.0-26.0)  mmol/L


 


ABG Total CO2   30.0 H   (21.0-29.0)  MMOL/L


 


ABG O2 Saturation   97   (94-98)  %


 


ABG Base Excess   1.0   (-2.0-3.0)  mmol/L


 


Real Test   POSITIVE   


 


VBG pH     (7.31-7.41)  


 


Ionized Calcium     (1.15-1.33)  mmol/L


 


O2 Delivery Device   BiPAP   


 


O2 Liters/Min     LPM


 


FiO2   40.00   


 


EPAP   5   cmH2O


 


IPAP   12   cmH2O


 


Sodium     (135-145)  mmol/L


 


Potassium     (3.5-5.0)  mmol/L


 


Chloride     (101-111)  mmol/L


 


Carbon Dioxide     (21-32)  mmol/L


 


Anion Gap     (6-13)  


 


BUN     (6-20)  mg/dL


 


Creatinine     (0.6-1.2)  mg/dL


 


Estimated GFR (MDRD)     (>89)  


 


Glucose     ()  mg/dL


 


Lactic Acid     (0.5-2.2)  mmol/L


 


Calcium     (8.5-10.3)  mg/dL


 


Phosphorus     (2.5-4.6)  mg/dL


 


Magnesium     (1.7-2.8)  mg/dL


 


Total Bilirubin     (0.2-1.0)  mg/dL


 


AST     (10-42)  IU/L


 


ALT     (10-60)  IU/L


 


Alkaline Phosphatase     ()  IU/L


 


Troponin I     (<0.49)  ng/mL


 


B-Natriuretic Peptide     (5-100)  pg/mL


 


Total Protein     (6.7-8.2)  g/dL


 


Albumin     (3.2-5.5)  g/dL


 


Globulin     (2.1-4.2)  g/dL


 


Albumin/Globulin Ratio     (1.0-2.2)  


 


Urine Color     


 


Urine Clarity     (CLEAR)  


 


Urine pH     (5.0-7.5)  PH


 


Ur Specific Gravity     (1.002-1.030)  


 


Urine Protein     (NEGATIVE)  mg/dL


 


Urine Glucose (UA)     (NEGATIVE)  mg/dL


 


Urine Ketones     (NEGATIVE)  mg/dL


 


Urine Occult Blood     (NEGATIVE)  


 


Urine Nitrite     (NEGATIVE)  


 


Urine Bilirubin     (NEGATIVE)  


 


Urine Urobilinogen     (NORMAL)  E.U./dL


 


Ur Leukocyte Esterase     (NEGATIVE)  


 


Ur Microscopic Review     


 


Urine Culture Comments     


 


Urine Creatinine    144.9  mg/dL


 


Urine Sodium    51.0  mmol/L














  07/12/17 07/12/17 07/12/17 Range/Units





  12:20 12:20 12:20 


 


WBC     (4.8-10.8)  x10^3/uL


 


RBC     (4.70-6.10)  10^6/uL


 


Hgb     (14.0-18.0)  g/dL


 


Hct     (42.0-52.0)  %


 


MCV     (80.0-94.0)  fL


 


MCH     (27.0-31.0)  pg


 


MCHC     (32.0-36.0)  g/dL


 


RDW     (12.0-15.0)  %


 


Plt Count     (130-450)  10^3/uL


 


MPV     (7.4-11.4)  fL


 


Neut #     (1.5-6.6)  10^3/uL


 


Lymph #     (1.5-3.5)  10^3/uL


 


Mono #     (0.0-1.0)  10^3/uL


 


Eos #     (0.0-0.7)  10^3/uL


 


Baso #     (0.0-0.1)  10^3/uL


 


Absolute Nucleated RBC     x10^3/uL


 


Nucleated RBCs     /100WBC


 


D-Dimer     (200.0-255.0)  ng/mL


 


Bld Gas Analysis Time     


 


Sample Site     


 


ABG pH     (7.35-7.45)  


 


ABG pCO2     (34-45)  mmHg


 


ABG pO2     ()  mmHg


 


ABG HCO3     (22.0-26.0)  mmol/L


 


ABG Total CO2     (21.0-29.0)  MMOL/L


 


ABG O2 Saturation     (94-98)  %


 


ABG Base Excess     (-2.0-3.0)  mmol/L


 


Real Test     


 


VBG pH     (7.31-7.41)  


 


Ionized Calcium     (1.15-1.33)  mmol/L


 


O2 Delivery Device     


 


O2 Liters/Min     LPM


 


FiO2     


 


EPAP     cmH2O


 


IPAP     cmH2O


 


Sodium    134 L  (135-145)  mmol/L


 


Potassium    6.0 H*  (3.5-5.0)  mmol/L


 


Chloride    102  (101-111)  mmol/L


 


Carbon Dioxide    25  (21-32)  mmol/L


 


Anion Gap    7.0  (6-13)  


 


BUN    46 H  (6-20)  mg/dL


 


Creatinine    1.7 H  (0.6-1.2)  mg/dL


 


Estimated GFR (MDRD)    41 L  (>89)  


 


Glucose    220 H  ()  mg/dL


 


Lactic Acid     (0.5-2.2)  mmol/L


 


Calcium    8.2 L  (8.5-10.3)  mg/dL


 


Phosphorus     (2.5-4.6)  mg/dL


 


Magnesium     (1.7-2.8)  mg/dL


 


Total Bilirubin     (0.2-1.0)  mg/dL


 


AST     (10-42)  IU/L


 


ALT     (10-60)  IU/L


 


Alkaline Phosphatase     ()  IU/L


 


Troponin I  < 0.04    (<0.49)  ng/mL


 


B-Natriuretic Peptide   87   (5-100)  pg/mL


 


Total Protein     (6.7-8.2)  g/dL


 


Albumin     (3.2-5.5)  g/dL


 


Globulin     (2.1-4.2)  g/dL


 


Albumin/Globulin Ratio     (1.0-2.2)  


 


Urine Color     


 


Urine Clarity     (CLEAR)  


 


Urine pH     (5.0-7.5)  PH


 


Ur Specific Gravity     (1.002-1.030)  


 


Urine Protein     (NEGATIVE)  mg/dL


 


Urine Glucose (UA)     (NEGATIVE)  mg/dL


 


Urine Ketones     (NEGATIVE)  mg/dL


 


Urine Occult Blood     (NEGATIVE)  


 


Urine Nitrite     (NEGATIVE)  


 


Urine Bilirubin     (NEGATIVE)  


 


Urine Urobilinogen     (NORMAL)  E.U./dL


 


Ur Leukocyte Esterase     (NEGATIVE)  


 


Ur Microscopic Review     


 


Urine Culture Comments     


 


Urine Creatinine     mg/dL


 


Urine Sodium     mmol/L














  07/12/17 07/12/17 07/12/17 Range/Units





  11:50 06:06 06:06 


 


WBC     (4.8-10.8)  x10^3/uL


 


RBC     (4.70-6.10)  10^6/uL


 


Hgb     (14.0-18.0)  g/dL


 


Hct     (42.0-52.0)  %


 


MCV     (80.0-94.0)  fL


 


MCH     (27.0-31.0)  pg


 


MCHC     (32.0-36.0)  g/dL


 


RDW     (12.0-15.0)  %


 


Plt Count     (130-450)  10^3/uL


 


MPV     (7.4-11.4)  fL


 


Neut #     (1.5-6.6)  10^3/uL


 


Lymph #     (1.5-3.5)  10^3/uL


 


Mono #     (0.0-1.0)  10^3/uL


 


Eos #     (0.0-0.7)  10^3/uL


 


Baso #     (0.0-0.1)  10^3/uL


 


Absolute Nucleated RBC     x10^3/uL


 


Nucleated RBCs     /100WBC


 


D-Dimer     (200.0-255.0)  ng/mL


 


Bld Gas Analysis Time  1200    


 


Sample Site  RIGHT RADIAL    


 


ABG pH  7.28 L    (7.35-7.45)  


 


ABG pCO2  55 H    (34-45)  mmHg


 


ABG pO2  63 L    ()  mmHg


 


ABG HCO3  25.8    (22.0-26.0)  mmol/L


 


ABG Total CO2  27.0    (21.0-29.0)  MMOL/L


 


ABG O2 Saturation  88 L    (94-98)  %


 


ABG Base Excess  -1.0    (-2.0-3.0)  mmol/L


 


Real Test  POSITIVE    


 


VBG pH     (7.31-7.41)  


 


Ionized Calcium     (1.15-1.33)  mmol/L


 


O2 Delivery Device  NASAL CANNULA    


 


O2 Liters/Min  3.00    LPM


 


FiO2     


 


EPAP     cmH2O


 


IPAP     cmH2O


 


Sodium    134 L  (135-145)  mmol/L


 


Potassium    5.7 H  (3.5-5.0)  mmol/L


 


Chloride    103  (101-111)  mmol/L


 


Carbon Dioxide    27  (21-32)  mmol/L


 


Anion Gap    4.0 L  (6-13)  


 


BUN    42 H  (6-20)  mg/dL


 


Creatinine    1.1  (0.6-1.2)  mg/dL


 


Estimated GFR (MDRD)    67 L  (>89)  


 


Glucose    145 H  ()  mg/dL


 


Lactic Acid     (0.5-2.2)  mmol/L


 


Calcium    8.0 L  (8.5-10.3)  mg/dL


 


Phosphorus   2.3 L   (2.5-4.6)  mg/dL


 


Magnesium   2.0   (1.7-2.8)  mg/dL


 


Total Bilirubin     (0.2-1.0)  mg/dL


 


AST     (10-42)  IU/L


 


ALT     (10-60)  IU/L


 


Alkaline Phosphatase     ()  IU/L


 


Troponin I     (<0.49)  ng/mL


 


B-Natriuretic Peptide     (5-100)  pg/mL


 


Total Protein     (6.7-8.2)  g/dL


 


Albumin     (3.2-5.5)  g/dL


 


Globulin     (2.1-4.2)  g/dL


 


Albumin/Globulin Ratio     (1.0-2.2)  


 


Urine Color     


 


Urine Clarity     (CLEAR)  


 


Urine pH     (5.0-7.5)  PH


 


Ur Specific Gravity     (1.002-1.030)  


 


Urine Protein     (NEGATIVE)  mg/dL


 


Urine Glucose (UA)     (NEGATIVE)  mg/dL


 


Urine Ketones     (NEGATIVE)  mg/dL


 


Urine Occult Blood     (NEGATIVE)  


 


Urine Nitrite     (NEGATIVE)  


 


Urine Bilirubin     (NEGATIVE)  


 


Urine Urobilinogen     (NORMAL)  E.U./dL


 


Ur Leukocyte Esterase     (NEGATIVE)  


 


Ur Microscopic Review     


 


Urine Culture Comments     


 


Urine Creatinine     mg/dL


 


Urine Sodium     mmol/L














  07/11/17 07/11/17 07/11/17 Range/Units





  22:15 20:38 20:38 


 


WBC     (4.8-10.8)  x10^3/uL


 


RBC     (4.70-6.10)  10^6/uL


 


Hgb     (14.0-18.0)  g/dL


 


Hct     (42.0-52.0)  %


 


MCV     (80.0-94.0)  fL


 


MCH     (27.0-31.0)  pg


 


MCHC     (32.0-36.0)  g/dL


 


RDW     (12.0-15.0)  %


 


Plt Count     (130-450)  10^3/uL


 


MPV     (7.4-11.4)  fL


 


Neut #     (1.5-6.6)  10^3/uL


 


Lymph #     (1.5-3.5)  10^3/uL


 


Mono #     (0.0-1.0)  10^3/uL


 


Eos #     (0.0-0.7)  10^3/uL


 


Baso #     (0.0-0.1)  10^3/uL


 


Absolute Nucleated RBC     x10^3/uL


 


Nucleated RBCs     /100WBC


 


D-Dimer     (200.0-255.0)  ng/mL


 


Bld Gas Analysis Time     


 


Sample Site     


 


ABG pH     (7.35-7.45)  


 


ABG pCO2     (34-45)  mmHg


 


ABG pO2     ()  mmHg


 


ABG HCO3     (22.0-26.0)  mmol/L


 


ABG Total CO2     (21.0-29.0)  MMOL/L


 


ABG O2 Saturation     (94-98)  %


 


ABG Base Excess     (-2.0-3.0)  mmol/L


 


Real Test     


 


VBG pH   7.248 L   (7.31-7.41)  


 


Ionized Calcium   1.12 L   (1.15-1.33)  mmol/L


 


O2 Delivery Device     


 


O2 Liters/Min     LPM


 


FiO2     


 


EPAP     cmH2O


 


IPAP     cmH2O


 


Sodium     (135-145)  mmol/L


 


Potassium     (3.5-5.0)  mmol/L


 


Chloride     (101-111)  mmol/L


 


Carbon Dioxide     (21-32)  mmol/L


 


Anion Gap     (6-13)  


 


BUN     (6-20)  mg/dL


 


Creatinine     (0.6-1.2)  mg/dL


 


Estimated GFR (MDRD)     (>89)  


 


Glucose     ()  mg/dL


 


Lactic Acid     (0.5-2.2)  mmol/L


 


Calcium     (8.5-10.3)  mg/dL


 


Phosphorus     (2.5-4.6)  mg/dL


 


Magnesium     (1.7-2.8)  mg/dL


 


Total Bilirubin     (0.2-1.0)  mg/dL


 


AST     (10-42)  IU/L


 


ALT     (10-60)  IU/L


 


Alkaline Phosphatase     ()  IU/L


 


Troponin I    < 0.04  (<0.49)  ng/mL


 


B-Natriuretic Peptide     (5-100)  pg/mL


 


Total Protein     (6.7-8.2)  g/dL


 


Albumin     (3.2-5.5)  g/dL


 


Globulin     (2.1-4.2)  g/dL


 


Albumin/Globulin Ratio     (1.0-2.2)  


 


Urine Color  YELLOW    


 


Urine Clarity  CLEAR    (CLEAR)  


 


Urine pH  5.5    (5.0-7.5)  PH


 


Ur Specific Gravity  1.025    (1.002-1.030)  


 


Urine Protein  NEGATIVE    (NEGATIVE)  mg/dL


 


Urine Glucose (UA)  NEGATIVE    (NEGATIVE)  mg/dL


 


Urine Ketones  TRACE    (NEGATIVE)  mg/dL


 


Urine Occult Blood  NEGATIVE    (NEGATIVE)  


 


Urine Nitrite  NEGATIVE    (NEGATIVE)  


 


Urine Bilirubin  NEGATIVE    (NEGATIVE)  


 


Urine Urobilinogen  0.2 (NORMAL)    (NORMAL)  E.U./dL


 


Ur Leukocyte Esterase  NEGATIVE    (NEGATIVE)  


 


Ur Microscopic Review  NOT INDICATED    


 


Urine Culture Comments  NOT INDICATED    


 


Urine Creatinine     mg/dL


 


Urine Sodium     mmol/L














  07/11/17 07/11/17 07/11/17 Range/Units





  20:38 20:38 20:38 


 


WBC   7.7   (4.8-10.8)  x10^3/uL


 


RBC   3.32 L   (4.70-6.10)  10^6/uL


 


Hgb   10.8 L   (14.0-18.0)  g/dL


 


Hct   32.1 L   (42.0-52.0)  %


 


MCV   96.8 H   (80.0-94.0)  fL


 


MCH   32.5 H   (27.0-31.0)  pg


 


MCHC   33.5   (32.0-36.0)  g/dL


 


RDW   14.5   (12.0-15.0)  %


 


Plt Count   210   (130-450)  10^3/uL


 


MPV   8.1   (7.4-11.4)  fL


 


Neut #   6.1   (1.5-6.6)  10^3/uL


 


Lymph #   0.8 L   (1.5-3.5)  10^3/uL


 


Mono #   0.7   (0.0-1.0)  10^3/uL


 


Eos #   0.1   (0.0-0.7)  10^3/uL


 


Baso #   0.1   (0.0-0.1)  10^3/uL


 


Absolute Nucleated RBC   0.01   x10^3/uL


 


Nucleated RBCs   0.1   /100WBC


 


D-Dimer     (200.0-255.0)  ng/mL


 


Bld Gas Analysis Time     


 


Sample Site     


 


ABG pH     (7.35-7.45)  


 


ABG pCO2     (34-45)  mmHg


 


ABG pO2     ()  mmHg


 


ABG HCO3     (22.0-26.0)  mmol/L


 


ABG Total CO2     (21.0-29.0)  MMOL/L


 


ABG O2 Saturation     (94-98)  %


 


ABG Base Excess     (-2.0-3.0)  mmol/L


 


Real Test     


 


VBG pH     (7.31-7.41)  


 


Ionized Calcium     (1.15-1.33)  mmol/L


 


O2 Delivery Device     


 


O2 Liters/Min     LPM


 


FiO2     


 


EPAP     cmH2O


 


IPAP     cmH2O


 


Sodium     (135-145)  mmol/L


 


Potassium     (3.5-5.0)  mmol/L


 


Chloride     (101-111)  mmol/L


 


Carbon Dioxide     (21-32)  mmol/L


 


Anion Gap     (6-13)  


 


BUN     (6-20)  mg/dL


 


Creatinine     (0.6-1.2)  mg/dL


 


Estimated GFR (MDRD)     (>89)  


 


Glucose     ()  mg/dL


 


Lactic Acid    1.4  (0.5-2.2)  mmol/L


 


Calcium     (8.5-10.3)  mg/dL


 


Phosphorus  5.8 H    (2.5-4.6)  mg/dL


 


Magnesium  2.0    (1.7-2.8)  mg/dL


 


Total Bilirubin     (0.2-1.0)  mg/dL


 


AST     (10-42)  IU/L


 


ALT     (10-60)  IU/L


 


Alkaline Phosphatase     ()  IU/L


 


Troponin I     (<0.49)  ng/mL


 


B-Natriuretic Peptide     (5-100)  pg/mL


 


Total Protein     (6.7-8.2)  g/dL


 


Albumin     (3.2-5.5)  g/dL


 


Globulin     (2.1-4.2)  g/dL


 


Albumin/Globulin Ratio     (1.0-2.2)  


 


Urine Color     


 


Urine Clarity     (CLEAR)  


 


Urine pH     (5.0-7.5)  PH


 


Ur Specific Gravity     (1.002-1.030)  


 


Urine Protein     (NEGATIVE)  mg/dL


 


Urine Glucose (UA)     (NEGATIVE)  mg/dL


 


Urine Ketones     (NEGATIVE)  mg/dL


 


Urine Occult Blood     (NEGATIVE)  


 


Urine Nitrite     (NEGATIVE)  


 


Urine Bilirubin     (NEGATIVE)  


 


Urine Urobilinogen     (NORMAL)  E.U./dL


 


Ur Leukocyte Esterase     (NEGATIVE)  


 


Ur Microscopic Review     


 


Urine Culture Comments     


 


Urine Creatinine     mg/dL


 


Urine Sodium     mmol/L














  07/11/17 Range/Units





  20:38 


 


WBC   (4.8-10.8)  x10^3/uL


 


RBC   (4.70-6.10)  10^6/uL


 


Hgb   (14.0-18.0)  g/dL


 


Hct   (42.0-52.0)  %


 


MCV   (80.0-94.0)  fL


 


MCH   (27.0-31.0)  pg


 


MCHC   (32.0-36.0)  g/dL


 


RDW   (12.0-15.0)  %


 


Plt Count   (130-450)  10^3/uL


 


MPV   (7.4-11.4)  fL


 


Neut #   (1.5-6.6)  10^3/uL


 


Lymph #   (1.5-3.5)  10^3/uL


 


Mono #   (0.0-1.0)  10^3/uL


 


Eos #   (0.0-0.7)  10^3/uL


 


Baso #   (0.0-0.1)  10^3/uL


 


Absolute Nucleated RBC   x10^3/uL


 


Nucleated RBCs   /100WBC


 


D-Dimer   (200.0-255.0)  ng/mL


 


Bld Gas Analysis Time   


 


Sample Site   


 


ABG pH   (7.35-7.45)  


 


ABG pCO2   (34-45)  mmHg


 


ABG pO2   ()  mmHg


 


ABG HCO3   (22.0-26.0)  mmol/L


 


ABG Total CO2   (21.0-29.0)  MMOL/L


 


ABG O2 Saturation   (94-98)  %


 


ABG Base Excess   (-2.0-3.0)  mmol/L


 


Real Test   


 


VBG pH   (7.31-7.41)  


 


Ionized Calcium  YES  (1.15-1.33)  mmol/L


 


O2 Delivery Device   


 


O2 Liters/Min   LPM


 


FiO2   


 


EPAP   cmH2O


 


IPAP   cmH2O


 


Sodium  136  (135-145)  mmol/L


 


Potassium  5.8 H  (3.5-5.0)  mmol/L


 


Chloride  99 L  (101-111)  mmol/L


 


Carbon Dioxide  29  (21-32)  mmol/L


 


Anion Gap  8.0  (6-13)  


 


BUN  47 H  (6-20)  mg/dL


 


Creatinine  2.2 H  (0.6-1.2)  mg/dL


 


Estimated GFR (MDRD)  30 L  (>89)  


 


Glucose  149 H  ()  mg/dL


 


Lactic Acid   (0.5-2.2)  mmol/L


 


Calcium  7.9 L  (8.5-10.3)  mg/dL


 


Phosphorus   (2.5-4.6)  mg/dL


 


Magnesium   (1.7-2.8)  mg/dL


 


Total Bilirubin  0.6  (0.2-1.0)  mg/dL


 


AST  40  (10-42)  IU/L


 


ALT  18  (10-60)  IU/L


 


Alkaline Phosphatase  61  ()  IU/L


 


Troponin I   (<0.49)  ng/mL


 


B-Natriuretic Peptide   (5-100)  pg/mL


 


Total Protein  6.0 L  (6.7-8.2)  g/dL


 


Albumin  3.2  (3.2-5.5)  g/dL


 


Globulin  2.8  (2.1-4.2)  g/dL


 


Albumin/Globulin Ratio  1.1  (1.0-2.2)  


 


Urine Color   


 


Urine Clarity   (CLEAR)  


 


Urine pH   (5.0-7.5)  PH


 


Ur Specific Gravity   (1.002-1.030)  


 


Urine Protein   (NEGATIVE)  mg/dL


 


Urine Glucose (UA)   (NEGATIVE)  mg/dL


 


Urine Ketones   (NEGATIVE)  mg/dL


 


Urine Occult Blood   (NEGATIVE)  


 


Urine Nitrite   (NEGATIVE)  


 


Urine Bilirubin   (NEGATIVE)  


 


Urine Urobilinogen   (NORMAL)  E.U./dL


 


Ur Leukocyte Esterase   (NEGATIVE)  


 


Ur Microscopic Review   


 


Urine Culture Comments   


 


Urine Creatinine   mg/dL


 


Urine Sodium   mmol/L














- Diagnostic Imaging


Diagnostic Imaging Results: positive: Final report reviewed





Assessment/Plan





- Problem List


(1) Acute respiratory failure with hypoxia and hypercapnia


Impression: 


Patient became hypotensive last night and CXR at that time was negative through 

the day today patient has become increasingly short of breath, hypoxic now 

requiring 3L of O2 and lethargic


ABG showed acute respiratory failure with hypoxia and hypercapnia given patient 

was lethargic he had to be transferred to the ICU for BIPAP


Cause of respiratory failure is unclear as CXR is negative, he does not appear 

to be wheezing, he has some crackles on exam and swelling in legs but does not 

appear overloaded, echo prelim shows normal EF and no valvular abnormalities as 

well as no right heart strain, this could be secondary to pain medication and 

decreased respiratory drive causing hypoventilation with hypercapneia and 

hypoxia, given recent surgery and tachycardia need to r/o PE but cannot do CTA 

secondary to decreased GFR and D dimer is mildly elevated. Will not treat with 

therapuetic lovenox until CTA is positive as patient is hemodynamically stable 

and now improving on BiPAP given his bleeding risk with some post op anemia 

already.





Plan:


BiPAP 


Monitor ABGs


Supplemental O2


CTA of chest once GFR is improved 


Duoneanalisa








(2) MILAN (acute kidney injury)


Impression: 


FeNa is less than 1 this appears pre-renal likely secondary to hypotension last 

night as well as blood loss and fluid deficit from surgery 


Patients K is 6.0 and he has some mild acidosis but making good urine


Crt up to 1.7 





Plan:


IVFs


Avoid nephrotoxic agents


Monitor Crt








(3) Hyperkalemia


Impression: 


Patient has chronic hyperkalemia likely secondary to lisinopril


Now K is 6.0 likely worsened secondary to MILAN





Plan:


Stop lisinopril


Give IV lasix


Recheck K at 2100








(4) Acute encephalopathy


Impression: 


Secondary to hypercapnia 


Improving with BiPap 








(5) Acute blood loss as cause of postoperative anemia


Impression: 


Secondary to surgery 


Patient with blood loss anemia


Will continue to monitor Hb daily 








(6) Status post total hip replacement, right


Impression: 


POD#1


On pain meds for pain control but will be careful with IV pain meds as he may 

have had hypercapnia secondary to respiratory depression


Ortho on board 











(7) Moderate COPD (chronic obstructive pulmonary disease)


Impression: 


Does not appear to be in an exacerbation 


No wheezing 


Does have hypercapnia and hypoxia


COntinue Duonebs


If worsening then will consider steroids 


Supplemental O2 and BiPAP for resp failure 








(8) HTN (hypertension)


Impression: 


BP well controlled 


Hold lisinopril secondary to MILAN and hyperkalemia 


Qualifiers: 


   Hypertension type: essential hypertension   Qualified Code(s): I10 - 

Essential (primary) hypertension   





(9) Prophylactic use of low molecular weight heparin for venous thromboembolism


Impression: 


Lovenox for prophylaxis

## 2017-07-12 NOTE — ULTRASOUND REPORT
EXAM:

RENAL ULTRASOUND

 

EXAM DATE: 7/11/2017 11:03 PM.

 

CLINICAL HISTORY: New onset renal failure and obtundation with low blood pressure. Right hip replacem
ent yesterday.

 

COMPARISON: None.

 

TECHNIQUE: Real-time scanning was performed with static images obtained. 

 

FINDINGS:

Right Kidney: 10.3 cm. Normal echotexture with no stones, contour-deforming masses, or hydronephrosis
.

 

Left Kidney: 10.5 cm. Normal echotexture with no stones, contour-deforming masses, or hydronephrosis.
 

 

Bladder: Bladder is minimally distended measuring 18.2 cc. 

 

IMPRESSION: No significant renal abnormality. No evident hydronephrosis.

 

[facility]

Referring Provider Line: [phone]

 

SITE ID: [id number]

## 2017-07-12 NOTE — PROVIDER PROGRESS NOTE
Subjective





- Prog Note Date


Prog Note Date: 07/11/17


Prog Note Time: 12:30





- Subjective


Pt reports feeling: Improved (Pain in right hip is well-controlled.  Patient is 

not been up yet with physical therapy.  Denies shortness of breath or other 

respiratory issues.)





Objective





- Vital Signs/Intake & Output


Reviewed Vital Signs: Yes


Vital Signs: 





 Vital Signs x48h











  Temp Pulse Pulse Resp BP BP Pulse Ox


 


 07/12/17 13:00  37.0 C  112 H  110 H  13  120/80   99


 


 07/12/17 12:45  37.0 C   111 H  15   101/62  93


 


 07/12/17 11:38   112 H   22   


 


 07/12/17 08:22  37.5 C   119 H  20   108/68  93


 


 07/12/17 08:08   110 H   20   











Intake & Output: 





 Intake & Output











 07/09/17 07/10/17 07/11/17 07/12/17





 23:59 23:59 23:59 23:59


 


Intake Total  2485 3656 940


 


Output Total  350 1020 1600


 


Balance  2135 2636 -660














- Objective


General Appearance: positive: No acute distress, Alert


Eyes Bilateral: positive: Normal inspection


ENT: positive: ENT inspection nml


Neck: positive: Nml inspection


Respiratory: positive: No respiratory distress, Other (Distant breath sounds 

without wheezes, rales, or rhonchi.)


Cardiovascular: positive: Regular rate & rhythm, No murmur


Peripheral Pulses: 2+ Dorsalis pedis (R), 2+ Posterior tibialis (R)


Abdomen: positive: Non-tender, Nml bowel sounds, Other (obese.).  negative: 

Guarding


Skin: positive: Color nml, No rash, Warm, Dry


Extremities: positive: Non-tender, Nml appearance.  negative: Calf tenderness, 

Mat's sign/cords


Neurologic/Psychiatric: positive: Oriented x3, Motor nml, Sensation nml, Mood/

affect nml





- Lab Results


Fish Bones: 


 07/11/17 20:38





 07/12/17 12:20


Other Labs: 





 Lab Results x24hrs











  07/12/17 07/12/17 07/12/17 Range/Units





  12:20 12:20 12:20 


 


WBC     (4.8-10.8)  x10^3/uL


 


RBC     (4.70-6.10)  10^6/uL


 


Hgb     (14.0-18.0)  g/dL


 


Hct     (42.0-52.0)  %


 


MCV     (80.0-94.0)  fL


 


MCH     (27.0-31.0)  pg


 


MCHC     (32.0-36.0)  g/dL


 


RDW     (12.0-15.0)  %


 


Plt Count     (130-450)  10^3/uL


 


MPV     (7.4-11.4)  fL


 


Neut #     (1.5-6.6)  10^3/uL


 


Lymph #     (1.5-3.5)  10^3/uL


 


Mono #     (0.0-1.0)  10^3/uL


 


Eos #     (0.0-0.7)  10^3/uL


 


Baso #     (0.0-0.1)  10^3/uL


 


Absolute Nucleated RBC     x10^3/uL


 


Nucleated RBCs     /100WBC


 


Bld Gas Analysis Time     


 


Sample Site     


 


ABG pH     (7.35-7.45)  


 


ABG pCO2     (34-45)  mmHg


 


ABG pO2     ()  mmHg


 


ABG HCO3     (22.0-26.0)  mmol/L


 


ABG Total CO2     (21.0-29.0)  MMOL/L


 


ABG O2 Saturation     (94-98)  %


 


ABG Base Excess     (-2.0-3.0)  mmol/L


 


Real Test     


 


VBG pH     (7.31-7.41)  


 


Ionized Calcium     (1.15-1.33)  mmol/L


 


O2 Delivery Device     


 


O2 Liters/Min     LPM


 


Sodium    134 L  (135-145)  mmol/L


 


Potassium    6.0 H*  (3.5-5.0)  mmol/L


 


Chloride    102  (101-111)  mmol/L


 


Carbon Dioxide    25  (21-32)  mmol/L


 


Anion Gap    7.0  (6-13)  


 


BUN    46 H  (6-20)  mg/dL


 


Creatinine    1.7 H  (0.6-1.2)  mg/dL


 


Estimated GFR (MDRD)    41 L  (>89)  


 


Glucose    220 H  ()  mg/dL


 


Lactic Acid     (0.5-2.2)  mmol/L


 


Calcium    8.2 L  (8.5-10.3)  mg/dL


 


Phosphorus     (2.5-4.6)  mg/dL


 


Magnesium     (1.7-2.8)  mg/dL


 


Total Bilirubin     (0.2-1.0)  mg/dL


 


AST     (10-42)  IU/L


 


ALT     (10-60)  IU/L


 


Alkaline Phosphatase     ()  IU/L


 


Troponin I  < 0.04    (<0.49)  ng/mL


 


B-Natriuretic Peptide   87   (5-100)  pg/mL


 


Total Protein     (6.7-8.2)  g/dL


 


Albumin     (3.2-5.5)  g/dL


 


Globulin     (2.1-4.2)  g/dL


 


Albumin/Globulin Ratio     (1.0-2.2)  


 


Urine Color     


 


Urine Clarity     (CLEAR)  


 


Urine pH     (5.0-7.5)  PH


 


Ur Specific Gravity     (1.002-1.030)  


 


Urine Protein     (NEGATIVE)  mg/dL


 


Urine Glucose (UA)     (NEGATIVE)  mg/dL


 


Urine Ketones     (NEGATIVE)  mg/dL


 


Urine Occult Blood     (NEGATIVE)  


 


Urine Nitrite     (NEGATIVE)  


 


Urine Bilirubin     (NEGATIVE)  


 


Urine Urobilinogen     (NORMAL)  E.U./dL


 


Ur Leukocyte Esterase     (NEGATIVE)  


 


Ur Microscopic Review     


 


Urine Culture Comments     














  07/12/17 07/12/17 07/12/17 Range/Units





  11:50 06:06 06:06 


 


WBC     (4.8-10.8)  x10^3/uL


 


RBC     (4.70-6.10)  10^6/uL


 


Hgb     (14.0-18.0)  g/dL


 


Hct     (42.0-52.0)  %


 


MCV     (80.0-94.0)  fL


 


MCH     (27.0-31.0)  pg


 


MCHC     (32.0-36.0)  g/dL


 


RDW     (12.0-15.0)  %


 


Plt Count     (130-450)  10^3/uL


 


MPV     (7.4-11.4)  fL


 


Neut #     (1.5-6.6)  10^3/uL


 


Lymph #     (1.5-3.5)  10^3/uL


 


Mono #     (0.0-1.0)  10^3/uL


 


Eos #     (0.0-0.7)  10^3/uL


 


Baso #     (0.0-0.1)  10^3/uL


 


Absolute Nucleated RBC     x10^3/uL


 


Nucleated RBCs     /100WBC


 


Bld Gas Analysis Time  1200    


 


Sample Site  RIGHT RADIAL    


 


ABG pH  7.28 L    (7.35-7.45)  


 


ABG pCO2  55 H    (34-45)  mmHg


 


ABG pO2  63 L    ()  mmHg


 


ABG HCO3  25.8    (22.0-26.0)  mmol/L


 


ABG Total CO2  27.0    (21.0-29.0)  MMOL/L


 


ABG O2 Saturation  88 L    (94-98)  %


 


ABG Base Excess  -1.0    (-2.0-3.0)  mmol/L


 


Real Test  POSITIVE    


 


VBG pH     (7.31-7.41)  


 


Ionized Calcium     (1.15-1.33)  mmol/L


 


O2 Delivery Device  NASAL CANNULA    


 


O2 Liters/Min  3.00    LPM


 


Sodium    134 L  (135-145)  mmol/L


 


Potassium    5.7 H  (3.5-5.0)  mmol/L


 


Chloride    103  (101-111)  mmol/L


 


Carbon Dioxide    27  (21-32)  mmol/L


 


Anion Gap    4.0 L  (6-13)  


 


BUN    42 H  (6-20)  mg/dL


 


Creatinine    1.1  (0.6-1.2)  mg/dL


 


Estimated GFR (MDRD)    67 L  (>89)  


 


Glucose    145 H  ()  mg/dL


 


Lactic Acid     (0.5-2.2)  mmol/L


 


Calcium    8.0 L  (8.5-10.3)  mg/dL


 


Phosphorus   2.3 L   (2.5-4.6)  mg/dL


 


Magnesium   2.0   (1.7-2.8)  mg/dL


 


Total Bilirubin     (0.2-1.0)  mg/dL


 


AST     (10-42)  IU/L


 


ALT     (10-60)  IU/L


 


Alkaline Phosphatase     ()  IU/L


 


Troponin I     (<0.49)  ng/mL


 


B-Natriuretic Peptide     (5-100)  pg/mL


 


Total Protein     (6.7-8.2)  g/dL


 


Albumin     (3.2-5.5)  g/dL


 


Globulin     (2.1-4.2)  g/dL


 


Albumin/Globulin Ratio     (1.0-2.2)  


 


Urine Color     


 


Urine Clarity     (CLEAR)  


 


Urine pH     (5.0-7.5)  PH


 


Ur Specific Gravity     (1.002-1.030)  


 


Urine Protein     (NEGATIVE)  mg/dL


 


Urine Glucose (UA)     (NEGATIVE)  mg/dL


 


Urine Ketones     (NEGATIVE)  mg/dL


 


Urine Occult Blood     (NEGATIVE)  


 


Urine Nitrite     (NEGATIVE)  


 


Urine Bilirubin     (NEGATIVE)  


 


Urine Urobilinogen     (NORMAL)  E.U./dL


 


Ur Leukocyte Esterase     (NEGATIVE)  


 


Ur Microscopic Review     


 


Urine Culture Comments     














  07/11/17 07/11/17 07/11/17 Range/Units





  22:15 20:38 20:38 


 


WBC     (4.8-10.8)  x10^3/uL


 


RBC     (4.70-6.10)  10^6/uL


 


Hgb     (14.0-18.0)  g/dL


 


Hct     (42.0-52.0)  %


 


MCV     (80.0-94.0)  fL


 


MCH     (27.0-31.0)  pg


 


MCHC     (32.0-36.0)  g/dL


 


RDW     (12.0-15.0)  %


 


Plt Count     (130-450)  10^3/uL


 


MPV     (7.4-11.4)  fL


 


Neut #     (1.5-6.6)  10^3/uL


 


Lymph #     (1.5-3.5)  10^3/uL


 


Mono #     (0.0-1.0)  10^3/uL


 


Eos #     (0.0-0.7)  10^3/uL


 


Baso #     (0.0-0.1)  10^3/uL


 


Absolute Nucleated RBC     x10^3/uL


 


Nucleated RBCs     /100WBC


 


Bld Gas Analysis Time     


 


Sample Site     


 


ABG pH     (7.35-7.45)  


 


ABG pCO2     (34-45)  mmHg


 


ABG pO2     ()  mmHg


 


ABG HCO3     (22.0-26.0)  mmol/L


 


ABG Total CO2     (21.0-29.0)  MMOL/L


 


ABG O2 Saturation     (94-98)  %


 


ABG Base Excess     (-2.0-3.0)  mmol/L


 


Real Test     


 


VBG pH   7.248 L   (7.31-7.41)  


 


Ionized Calcium   1.12 L   (1.15-1.33)  mmol/L


 


O2 Delivery Device     


 


O2 Liters/Min     LPM


 


Sodium     (135-145)  mmol/L


 


Potassium     (3.5-5.0)  mmol/L


 


Chloride     (101-111)  mmol/L


 


Carbon Dioxide     (21-32)  mmol/L


 


Anion Gap     (6-13)  


 


BUN     (6-20)  mg/dL


 


Creatinine     (0.6-1.2)  mg/dL


 


Estimated GFR (MDRD)     (>89)  


 


Glucose     ()  mg/dL


 


Lactic Acid     (0.5-2.2)  mmol/L


 


Calcium     (8.5-10.3)  mg/dL


 


Phosphorus     (2.5-4.6)  mg/dL


 


Magnesium     (1.7-2.8)  mg/dL


 


Total Bilirubin     (0.2-1.0)  mg/dL


 


AST     (10-42)  IU/L


 


ALT     (10-60)  IU/L


 


Alkaline Phosphatase     ()  IU/L


 


Troponin I    < 0.04  (<0.49)  ng/mL


 


B-Natriuretic Peptide     (5-100)  pg/mL


 


Total Protein     (6.7-8.2)  g/dL


 


Albumin     (3.2-5.5)  g/dL


 


Globulin     (2.1-4.2)  g/dL


 


Albumin/Globulin Ratio     (1.0-2.2)  


 


Urine Color  YELLOW    


 


Urine Clarity  CLEAR    (CLEAR)  


 


Urine pH  5.5    (5.0-7.5)  PH


 


Ur Specific Gravity  1.025    (1.002-1.030)  


 


Urine Protein  NEGATIVE    (NEGATIVE)  mg/dL


 


Urine Glucose (UA)  NEGATIVE    (NEGATIVE)  mg/dL


 


Urine Ketones  TRACE    (NEGATIVE)  mg/dL


 


Urine Occult Blood  NEGATIVE    (NEGATIVE)  


 


Urine Nitrite  NEGATIVE    (NEGATIVE)  


 


Urine Bilirubin  NEGATIVE    (NEGATIVE)  


 


Urine Urobilinogen  0.2 (NORMAL)    (NORMAL)  E.U./dL


 


Ur Leukocyte Esterase  NEGATIVE    (NEGATIVE)  


 


Ur Microscopic Review  NOT INDICATED    


 


Urine Culture Comments  NOT INDICATED    














  07/11/17 07/11/17 07/11/17 Range/Units





  20:38 20:38 20:38 


 


WBC   7.7   (4.8-10.8)  x10^3/uL


 


RBC   3.32 L   (4.70-6.10)  10^6/uL


 


Hgb   10.8 L   (14.0-18.0)  g/dL


 


Hct   32.1 L   (42.0-52.0)  %


 


MCV   96.8 H   (80.0-94.0)  fL


 


MCH   32.5 H   (27.0-31.0)  pg


 


MCHC   33.5   (32.0-36.0)  g/dL


 


RDW   14.5   (12.0-15.0)  %


 


Plt Count   210   (130-450)  10^3/uL


 


MPV   8.1   (7.4-11.4)  fL


 


Neut #   6.1   (1.5-6.6)  10^3/uL


 


Lymph #   0.8 L   (1.5-3.5)  10^3/uL


 


Mono #   0.7   (0.0-1.0)  10^3/uL


 


Eos #   0.1   (0.0-0.7)  10^3/uL


 


Baso #   0.1   (0.0-0.1)  10^3/uL


 


Absolute Nucleated RBC   0.01   x10^3/uL


 


Nucleated RBCs   0.1   /100WBC


 


Bld Gas Analysis Time     


 


Sample Site     


 


ABG pH     (7.35-7.45)  


 


ABG pCO2     (34-45)  mmHg


 


ABG pO2     ()  mmHg


 


ABG HCO3     (22.0-26.0)  mmol/L


 


ABG Total CO2     (21.0-29.0)  MMOL/L


 


ABG O2 Saturation     (94-98)  %


 


ABG Base Excess     (-2.0-3.0)  mmol/L


 


Real Test     


 


VBG pH     (7.31-7.41)  


 


Ionized Calcium     (1.15-1.33)  mmol/L


 


O2 Delivery Device     


 


O2 Liters/Min     LPM


 


Sodium     (135-145)  mmol/L


 


Potassium     (3.5-5.0)  mmol/L


 


Chloride     (101-111)  mmol/L


 


Carbon Dioxide     (21-32)  mmol/L


 


Anion Gap     (6-13)  


 


BUN     (6-20)  mg/dL


 


Creatinine     (0.6-1.2)  mg/dL


 


Estimated GFR (MDRD)     (>89)  


 


Glucose     ()  mg/dL


 


Lactic Acid    1.4  (0.5-2.2)  mmol/L


 


Calcium     (8.5-10.3)  mg/dL


 


Phosphorus  5.8 H    (2.5-4.6)  mg/dL


 


Magnesium  2.0    (1.7-2.8)  mg/dL


 


Total Bilirubin     (0.2-1.0)  mg/dL


 


AST     (10-42)  IU/L


 


ALT     (10-60)  IU/L


 


Alkaline Phosphatase     ()  IU/L


 


Troponin I     (<0.49)  ng/mL


 


B-Natriuretic Peptide     (5-100)  pg/mL


 


Total Protein     (6.7-8.2)  g/dL


 


Albumin     (3.2-5.5)  g/dL


 


Globulin     (2.1-4.2)  g/dL


 


Albumin/Globulin Ratio     (1.0-2.2)  


 


Urine Color     


 


Urine Clarity     (CLEAR)  


 


Urine pH     (5.0-7.5)  PH


 


Ur Specific Gravity     (1.002-1.030)  


 


Urine Protein     (NEGATIVE)  mg/dL


 


Urine Glucose (UA)     (NEGATIVE)  mg/dL


 


Urine Ketones     (NEGATIVE)  mg/dL


 


Urine Occult Blood     (NEGATIVE)  


 


Urine Nitrite     (NEGATIVE)  


 


Urine Bilirubin     (NEGATIVE)  


 


Urine Urobilinogen     (NORMAL)  E.U./dL


 


Ur Leukocyte Esterase     (NEGATIVE)  


 


Ur Microscopic Review     


 


Urine Culture Comments     














  07/11/17 Range/Units





  20:38 


 


WBC   (4.8-10.8)  x10^3/uL


 


RBC   (4.70-6.10)  10^6/uL


 


Hgb   (14.0-18.0)  g/dL


 


Hct   (42.0-52.0)  %


 


MCV   (80.0-94.0)  fL


 


MCH   (27.0-31.0)  pg


 


MCHC   (32.0-36.0)  g/dL


 


RDW   (12.0-15.0)  %


 


Plt Count   (130-450)  10^3/uL


 


MPV   (7.4-11.4)  fL


 


Neut #   (1.5-6.6)  10^3/uL


 


Lymph #   (1.5-3.5)  10^3/uL


 


Mono #   (0.0-1.0)  10^3/uL


 


Eos #   (0.0-0.7)  10^3/uL


 


Baso #   (0.0-0.1)  10^3/uL


 


Absolute Nucleated RBC   x10^3/uL


 


Nucleated RBCs   /100WBC


 


Bld Gas Analysis Time   


 


Sample Site   


 


ABG pH   (7.35-7.45)  


 


ABG pCO2   (34-45)  mmHg


 


ABG pO2   ()  mmHg


 


ABG HCO3   (22.0-26.0)  mmol/L


 


ABG Total CO2   (21.0-29.0)  MMOL/L


 


ABG O2 Saturation   (94-98)  %


 


ABG Base Excess   (-2.0-3.0)  mmol/L


 


Real Test   


 


VBG pH   (7.31-7.41)  


 


Ionized Calcium  YES  (1.15-1.33)  mmol/L


 


O2 Delivery Device   


 


O2 Liters/Min   LPM


 


Sodium  136  (135-145)  mmol/L


 


Potassium  5.8 H  (3.5-5.0)  mmol/L


 


Chloride  99 L  (101-111)  mmol/L


 


Carbon Dioxide  29  (21-32)  mmol/L


 


Anion Gap  8.0  (6-13)  


 


BUN  47 H  (6-20)  mg/dL


 


Creatinine  2.2 H  (0.6-1.2)  mg/dL


 


Estimated GFR (MDRD)  30 L  (>89)  


 


Glucose  149 H  ()  mg/dL


 


Lactic Acid   (0.5-2.2)  mmol/L


 


Calcium  7.9 L  (8.5-10.3)  mg/dL


 


Phosphorus   (2.5-4.6)  mg/dL


 


Magnesium   (1.7-2.8)  mg/dL


 


Total Bilirubin  0.6  (0.2-1.0)  mg/dL


 


AST  40  (10-42)  IU/L


 


ALT  18  (10-60)  IU/L


 


Alkaline Phosphatase  61  ()  IU/L


 


Troponin I   (<0.49)  ng/mL


 


B-Natriuretic Peptide   (5-100)  pg/mL


 


Total Protein  6.0 L  (6.7-8.2)  g/dL


 


Albumin  3.2  (3.2-5.5)  g/dL


 


Globulin  2.8  (2.1-4.2)  g/dL


 


Albumin/Globulin Ratio  1.1  (1.0-2.2)  


 


Urine Color   


 


Urine Clarity   (CLEAR)  


 


Urine pH   (5.0-7.5)  PH


 


Ur Specific Gravity   (1.002-1.030)  


 


Urine Protein   (NEGATIVE)  mg/dL


 


Urine Glucose (UA)   (NEGATIVE)  mg/dL


 


Urine Ketones   (NEGATIVE)  mg/dL


 


Urine Occult Blood   (NEGATIVE)  


 


Urine Nitrite   (NEGATIVE)  


 


Urine Bilirubin   (NEGATIVE)  


 


Urine Urobilinogen   (NORMAL)  E.U./dL


 


Ur Leukocyte Esterase   (NEGATIVE)  


 


Ur Microscopic Review   


 


Urine Culture Comments   














- Diagnostic Imaging


Diagnostic Imaging Results: positive: Read independently


Diagnostic Imaging Comments: 





right total hip arthroplast





Assessment/Plan





- Problem List


(1) Status post total hip replacement, right


Impression: 


Impression: stable postoperative day 1.





Plan:


1. mobilize with physical therapy today.


2. anticipate discharge tomorrow or Thursday.

## 2017-07-12 NOTE — PROVIDER PROGRESS NOTE
Subjective





- Prog Note Date


Prog Note Date: 07/12/17


Prog Note Time: 12:20





- Subjective


Pt reports feeling: Worse (Patient has had a significant deterioration of his 

respiratory status.  Recent ABG shows PO2 of 63 with a PCO2 of 55.  On 

recommendation of hospitalist he is being transferred to the intensive care 

unit for BPAP treatment and closer monitoring.  In addition he had a spike in 

his creatinine after been given 2 doses of Toradol overnight.  His creatinine 

is back down to 1.2 this morning.)





Objective





- Vital Signs/Intake & Output


Reviewed Vital Signs: Yes


Vital Signs: 





 Vital Signs x48h











  Temp Pulse Pulse Resp BP BP Pulse Ox


 


 07/12/17 13:00  37.0 C  112 H  110 H  13  120/80   99


 


 07/12/17 12:45  37.0 C   111 H  15   101/62  93


 


 07/12/17 11:38   112 H   22   


 


 07/12/17 08:22  37.5 C   119 H  20   108/68  93


 


 07/12/17 08:08   110 H   20   











Intake & Output: 





 Intake & Output











 07/09/17 07/10/17 07/11/17 07/12/17





 23:59 23:59 23:59 23:59


 


Intake Total  2485 3656 940


 


Output Total  350 1020 1600


 


Balance  2135 5966 -153














- Objective


General Appearance: positive: Moderate distress, Anxious


Eyes Bilateral: positive: Normal inspection


ENT: positive: ENT inspection nml


Neck: positive: Nml inspection


Respiratory: positive: Other (moderate shortness of  breath.)


Cardiovascular: positive: Tachycardia


Peripheral Pulses: 2+ Dorsalis pedis (R), 2+ Posterior tibialis (R)


Abdomen: positive: Non-tender, Nml bowel sounds, Other (obese.).  negative: 

Guarding


Back: positive: Nml inspection


Skin: positive: Color nml, No rash, Warm, Dry


Extremities: positive: Nml appearance, Other (Incision over right hip CDI with 

intact Aquacel dressing.).  negative: Calf tenderness, Mat's sign/cords


Neurologic/Psychiatric: positive: Oriented x3, Motor nml, Sensation nml, Mood/

affect nml





- Lab Results


Fish Bones: 


 07/11/17 20:38





 07/12/17 12:20


Other Labs: 





 Lab Results x24hrs











  07/12/17 07/12/17 07/12/17 Range/Units





  12:20 12:20 12:20 


 


WBC     (4.8-10.8)  x10^3/uL


 


RBC     (4.70-6.10)  10^6/uL


 


Hgb     (14.0-18.0)  g/dL


 


Hct     (42.0-52.0)  %


 


MCV     (80.0-94.0)  fL


 


MCH     (27.0-31.0)  pg


 


MCHC     (32.0-36.0)  g/dL


 


RDW     (12.0-15.0)  %


 


Plt Count     (130-450)  10^3/uL


 


MPV     (7.4-11.4)  fL


 


Neut #     (1.5-6.6)  10^3/uL


 


Lymph #     (1.5-3.5)  10^3/uL


 


Mono #     (0.0-1.0)  10^3/uL


 


Eos #     (0.0-0.7)  10^3/uL


 


Baso #     (0.0-0.1)  10^3/uL


 


Absolute Nucleated RBC     x10^3/uL


 


Nucleated RBCs     /100WBC


 


Bld Gas Analysis Time     


 


Sample Site     


 


ABG pH     (7.35-7.45)  


 


ABG pCO2     (34-45)  mmHg


 


ABG pO2     ()  mmHg


 


ABG HCO3     (22.0-26.0)  mmol/L


 


ABG Total CO2     (21.0-29.0)  MMOL/L


 


ABG O2 Saturation     (94-98)  %


 


ABG Base Excess     (-2.0-3.0)  mmol/L


 


Real Test     


 


VBG pH     (7.31-7.41)  


 


Ionized Calcium     (1.15-1.33)  mmol/L


 


O2 Delivery Device     


 


O2 Liters/Min     LPM


 


Sodium    134 L  (135-145)  mmol/L


 


Potassium    6.0 H*  (3.5-5.0)  mmol/L


 


Chloride    102  (101-111)  mmol/L


 


Carbon Dioxide    25  (21-32)  mmol/L


 


Anion Gap    7.0  (6-13)  


 


BUN    46 H  (6-20)  mg/dL


 


Creatinine    1.7 H  (0.6-1.2)  mg/dL


 


Estimated GFR (MDRD)    41 L  (>89)  


 


Glucose    220 H  ()  mg/dL


 


Lactic Acid     (0.5-2.2)  mmol/L


 


Calcium    8.2 L  (8.5-10.3)  mg/dL


 


Phosphorus     (2.5-4.6)  mg/dL


 


Magnesium     (1.7-2.8)  mg/dL


 


Total Bilirubin     (0.2-1.0)  mg/dL


 


AST     (10-42)  IU/L


 


ALT     (10-60)  IU/L


 


Alkaline Phosphatase     ()  IU/L


 


Troponin I  < 0.04    (<0.49)  ng/mL


 


B-Natriuretic Peptide   87   (5-100)  pg/mL


 


Total Protein     (6.7-8.2)  g/dL


 


Albumin     (3.2-5.5)  g/dL


 


Globulin     (2.1-4.2)  g/dL


 


Albumin/Globulin Ratio     (1.0-2.2)  


 


Urine Color     


 


Urine Clarity     (CLEAR)  


 


Urine pH     (5.0-7.5)  PH


 


Ur Specific Gravity     (1.002-1.030)  


 


Urine Protein     (NEGATIVE)  mg/dL


 


Urine Glucose (UA)     (NEGATIVE)  mg/dL


 


Urine Ketones     (NEGATIVE)  mg/dL


 


Urine Occult Blood     (NEGATIVE)  


 


Urine Nitrite     (NEGATIVE)  


 


Urine Bilirubin     (NEGATIVE)  


 


Urine Urobilinogen     (NORMAL)  E.U./dL


 


Ur Leukocyte Esterase     (NEGATIVE)  


 


Ur Microscopic Review     


 


Urine Culture Comments     














  07/12/17 07/12/17 07/12/17 Range/Units





  11:50 06:06 06:06 


 


WBC     (4.8-10.8)  x10^3/uL


 


RBC     (4.70-6.10)  10^6/uL


 


Hgb     (14.0-18.0)  g/dL


 


Hct     (42.0-52.0)  %


 


MCV     (80.0-94.0)  fL


 


MCH     (27.0-31.0)  pg


 


MCHC     (32.0-36.0)  g/dL


 


RDW     (12.0-15.0)  %


 


Plt Count     (130-450)  10^3/uL


 


MPV     (7.4-11.4)  fL


 


Neut #     (1.5-6.6)  10^3/uL


 


Lymph #     (1.5-3.5)  10^3/uL


 


Mono #     (0.0-1.0)  10^3/uL


 


Eos #     (0.0-0.7)  10^3/uL


 


Baso #     (0.0-0.1)  10^3/uL


 


Absolute Nucleated RBC     x10^3/uL


 


Nucleated RBCs     /100WBC


 


Bld Gas Analysis Time  1200    


 


Sample Site  RIGHT RADIAL    


 


ABG pH  7.28 L    (7.35-7.45)  


 


ABG pCO2  55 H    (34-45)  mmHg


 


ABG pO2  63 L    ()  mmHg


 


ABG HCO3  25.8    (22.0-26.0)  mmol/L


 


ABG Total CO2  27.0    (21.0-29.0)  MMOL/L


 


ABG O2 Saturation  88 L    (94-98)  %


 


ABG Base Excess  -1.0    (-2.0-3.0)  mmol/L


 


Real Test  POSITIVE    


 


VBG pH     (7.31-7.41)  


 


Ionized Calcium     (1.15-1.33)  mmol/L


 


O2 Delivery Device  NASAL CANNULA    


 


O2 Liters/Min  3.00    LPM


 


Sodium    134 L  (135-145)  mmol/L


 


Potassium    5.7 H  (3.5-5.0)  mmol/L


 


Chloride    103  (101-111)  mmol/L


 


Carbon Dioxide    27  (21-32)  mmol/L


 


Anion Gap    4.0 L  (6-13)  


 


BUN    42 H  (6-20)  mg/dL


 


Creatinine    1.1  (0.6-1.2)  mg/dL


 


Estimated GFR (MDRD)    67 L  (>89)  


 


Glucose    145 H  ()  mg/dL


 


Lactic Acid     (0.5-2.2)  mmol/L


 


Calcium    8.0 L  (8.5-10.3)  mg/dL


 


Phosphorus   2.3 L   (2.5-4.6)  mg/dL


 


Magnesium   2.0   (1.7-2.8)  mg/dL


 


Total Bilirubin     (0.2-1.0)  mg/dL


 


AST     (10-42)  IU/L


 


ALT     (10-60)  IU/L


 


Alkaline Phosphatase     ()  IU/L


 


Troponin I     (<0.49)  ng/mL


 


B-Natriuretic Peptide     (5-100)  pg/mL


 


Total Protein     (6.7-8.2)  g/dL


 


Albumin     (3.2-5.5)  g/dL


 


Globulin     (2.1-4.2)  g/dL


 


Albumin/Globulin Ratio     (1.0-2.2)  


 


Urine Color     


 


Urine Clarity     (CLEAR)  


 


Urine pH     (5.0-7.5)  PH


 


Ur Specific Gravity     (1.002-1.030)  


 


Urine Protein     (NEGATIVE)  mg/dL


 


Urine Glucose (UA)     (NEGATIVE)  mg/dL


 


Urine Ketones     (NEGATIVE)  mg/dL


 


Urine Occult Blood     (NEGATIVE)  


 


Urine Nitrite     (NEGATIVE)  


 


Urine Bilirubin     (NEGATIVE)  


 


Urine Urobilinogen     (NORMAL)  E.U./dL


 


Ur Leukocyte Esterase     (NEGATIVE)  


 


Ur Microscopic Review     


 


Urine Culture Comments     














  07/11/17 07/11/17 07/11/17 Range/Units





  22:15 20:38 20:38 


 


WBC     (4.8-10.8)  x10^3/uL


 


RBC     (4.70-6.10)  10^6/uL


 


Hgb     (14.0-18.0)  g/dL


 


Hct     (42.0-52.0)  %


 


MCV     (80.0-94.0)  fL


 


MCH     (27.0-31.0)  pg


 


MCHC     (32.0-36.0)  g/dL


 


RDW     (12.0-15.0)  %


 


Plt Count     (130-450)  10^3/uL


 


MPV     (7.4-11.4)  fL


 


Neut #     (1.5-6.6)  10^3/uL


 


Lymph #     (1.5-3.5)  10^3/uL


 


Mono #     (0.0-1.0)  10^3/uL


 


Eos #     (0.0-0.7)  10^3/uL


 


Baso #     (0.0-0.1)  10^3/uL


 


Absolute Nucleated RBC     x10^3/uL


 


Nucleated RBCs     /100WBC


 


Bld Gas Analysis Time     


 


Sample Site     


 


ABG pH     (7.35-7.45)  


 


ABG pCO2     (34-45)  mmHg


 


ABG pO2     ()  mmHg


 


ABG HCO3     (22.0-26.0)  mmol/L


 


ABG Total CO2     (21.0-29.0)  MMOL/L


 


ABG O2 Saturation     (94-98)  %


 


ABG Base Excess     (-2.0-3.0)  mmol/L


 


Real Test     


 


VBG pH   7.248 L   (7.31-7.41)  


 


Ionized Calcium   1.12 L   (1.15-1.33)  mmol/L


 


O2 Delivery Device     


 


O2 Liters/Min     LPM


 


Sodium     (135-145)  mmol/L


 


Potassium     (3.5-5.0)  mmol/L


 


Chloride     (101-111)  mmol/L


 


Carbon Dioxide     (21-32)  mmol/L


 


Anion Gap     (6-13)  


 


BUN     (6-20)  mg/dL


 


Creatinine     (0.6-1.2)  mg/dL


 


Estimated GFR (MDRD)     (>89)  


 


Glucose     ()  mg/dL


 


Lactic Acid     (0.5-2.2)  mmol/L


 


Calcium     (8.5-10.3)  mg/dL


 


Phosphorus     (2.5-4.6)  mg/dL


 


Magnesium     (1.7-2.8)  mg/dL


 


Total Bilirubin     (0.2-1.0)  mg/dL


 


AST     (10-42)  IU/L


 


ALT     (10-60)  IU/L


 


Alkaline Phosphatase     ()  IU/L


 


Troponin I    < 0.04  (<0.49)  ng/mL


 


B-Natriuretic Peptide     (5-100)  pg/mL


 


Total Protein     (6.7-8.2)  g/dL


 


Albumin     (3.2-5.5)  g/dL


 


Globulin     (2.1-4.2)  g/dL


 


Albumin/Globulin Ratio     (1.0-2.2)  


 


Urine Color  YELLOW    


 


Urine Clarity  CLEAR    (CLEAR)  


 


Urine pH  5.5    (5.0-7.5)  PH


 


Ur Specific Gravity  1.025    (1.002-1.030)  


 


Urine Protein  NEGATIVE    (NEGATIVE)  mg/dL


 


Urine Glucose (UA)  NEGATIVE    (NEGATIVE)  mg/dL


 


Urine Ketones  TRACE    (NEGATIVE)  mg/dL


 


Urine Occult Blood  NEGATIVE    (NEGATIVE)  


 


Urine Nitrite  NEGATIVE    (NEGATIVE)  


 


Urine Bilirubin  NEGATIVE    (NEGATIVE)  


 


Urine Urobilinogen  0.2 (NORMAL)    (NORMAL)  E.U./dL


 


Ur Leukocyte Esterase  NEGATIVE    (NEGATIVE)  


 


Ur Microscopic Review  NOT INDICATED    


 


Urine Culture Comments  NOT INDICATED    














  07/11/17 07/11/17 07/11/17 Range/Units





  20:38 20:38 20:38 


 


WBC   7.7   (4.8-10.8)  x10^3/uL


 


RBC   3.32 L   (4.70-6.10)  10^6/uL


 


Hgb   10.8 L   (14.0-18.0)  g/dL


 


Hct   32.1 L   (42.0-52.0)  %


 


MCV   96.8 H   (80.0-94.0)  fL


 


MCH   32.5 H   (27.0-31.0)  pg


 


MCHC   33.5   (32.0-36.0)  g/dL


 


RDW   14.5   (12.0-15.0)  %


 


Plt Count   210   (130-450)  10^3/uL


 


MPV   8.1   (7.4-11.4)  fL


 


Neut #   6.1   (1.5-6.6)  10^3/uL


 


Lymph #   0.8 L   (1.5-3.5)  10^3/uL


 


Mono #   0.7   (0.0-1.0)  10^3/uL


 


Eos #   0.1   (0.0-0.7)  10^3/uL


 


Baso #   0.1   (0.0-0.1)  10^3/uL


 


Absolute Nucleated RBC   0.01   x10^3/uL


 


Nucleated RBCs   0.1   /100WBC


 


Bld Gas Analysis Time     


 


Sample Site     


 


ABG pH     (7.35-7.45)  


 


ABG pCO2     (34-45)  mmHg


 


ABG pO2     ()  mmHg


 


ABG HCO3     (22.0-26.0)  mmol/L


 


ABG Total CO2     (21.0-29.0)  MMOL/L


 


ABG O2 Saturation     (94-98)  %


 


ABG Base Excess     (-2.0-3.0)  mmol/L


 


Real Test     


 


VBG pH     (7.31-7.41)  


 


Ionized Calcium     (1.15-1.33)  mmol/L


 


O2 Delivery Device     


 


O2 Liters/Min     LPM


 


Sodium     (135-145)  mmol/L


 


Potassium     (3.5-5.0)  mmol/L


 


Chloride     (101-111)  mmol/L


 


Carbon Dioxide     (21-32)  mmol/L


 


Anion Gap     (6-13)  


 


BUN     (6-20)  mg/dL


 


Creatinine     (0.6-1.2)  mg/dL


 


Estimated GFR (MDRD)     (>89)  


 


Glucose     ()  mg/dL


 


Lactic Acid    1.4  (0.5-2.2)  mmol/L


 


Calcium     (8.5-10.3)  mg/dL


 


Phosphorus  5.8 H    (2.5-4.6)  mg/dL


 


Magnesium  2.0    (1.7-2.8)  mg/dL


 


Total Bilirubin     (0.2-1.0)  mg/dL


 


AST     (10-42)  IU/L


 


ALT     (10-60)  IU/L


 


Alkaline Phosphatase     ()  IU/L


 


Troponin I     (<0.49)  ng/mL


 


B-Natriuretic Peptide     (5-100)  pg/mL


 


Total Protein     (6.7-8.2)  g/dL


 


Albumin     (3.2-5.5)  g/dL


 


Globulin     (2.1-4.2)  g/dL


 


Albumin/Globulin Ratio     (1.0-2.2)  


 


Urine Color     


 


Urine Clarity     (CLEAR)  


 


Urine pH     (5.0-7.5)  PH


 


Ur Specific Gravity     (1.002-1.030)  


 


Urine Protein     (NEGATIVE)  mg/dL


 


Urine Glucose (UA)     (NEGATIVE)  mg/dL


 


Urine Ketones     (NEGATIVE)  mg/dL


 


Urine Occult Blood     (NEGATIVE)  


 


Urine Nitrite     (NEGATIVE)  


 


Urine Bilirubin     (NEGATIVE)  


 


Urine Urobilinogen     (NORMAL)  E.U./dL


 


Ur Leukocyte Esterase     (NEGATIVE)  


 


Ur Microscopic Review     


 


Urine Culture Comments     














  07/11/17 Range/Units





  20:38 


 


WBC   (4.8-10.8)  x10^3/uL


 


RBC   (4.70-6.10)  10^6/uL


 


Hgb   (14.0-18.0)  g/dL


 


Hct   (42.0-52.0)  %


 


MCV   (80.0-94.0)  fL


 


MCH   (27.0-31.0)  pg


 


MCHC   (32.0-36.0)  g/dL


 


RDW   (12.0-15.0)  %


 


Plt Count   (130-450)  10^3/uL


 


MPV   (7.4-11.4)  fL


 


Neut #   (1.5-6.6)  10^3/uL


 


Lymph #   (1.5-3.5)  10^3/uL


 


Mono #   (0.0-1.0)  10^3/uL


 


Eos #   (0.0-0.7)  10^3/uL


 


Baso #   (0.0-0.1)  10^3/uL


 


Absolute Nucleated RBC   x10^3/uL


 


Nucleated RBCs   /100WBC


 


Bld Gas Analysis Time   


 


Sample Site   


 


ABG pH   (7.35-7.45)  


 


ABG pCO2   (34-45)  mmHg


 


ABG pO2   ()  mmHg


 


ABG HCO3   (22.0-26.0)  mmol/L


 


ABG Total CO2   (21.0-29.0)  MMOL/L


 


ABG O2 Saturation   (94-98)  %


 


ABG Base Excess   (-2.0-3.0)  mmol/L


 


Real Test   


 


VBG pH   (7.31-7.41)  


 


Ionized Calcium  YES  (1.15-1.33)  mmol/L


 


O2 Delivery Device   


 


O2 Liters/Min   LPM


 


Sodium  136  (135-145)  mmol/L


 


Potassium  5.8 H  (3.5-5.0)  mmol/L


 


Chloride  99 L  (101-111)  mmol/L


 


Carbon Dioxide  29  (21-32)  mmol/L


 


Anion Gap  8.0  (6-13)  


 


BUN  47 H  (6-20)  mg/dL


 


Creatinine  2.2 H  (0.6-1.2)  mg/dL


 


Estimated GFR (MDRD)  30 L  (>89)  


 


Glucose  149 H  ()  mg/dL


 


Lactic Acid   (0.5-2.2)  mmol/L


 


Calcium  7.9 L  (8.5-10.3)  mg/dL


 


Phosphorus   (2.5-4.6)  mg/dL


 


Magnesium   (1.7-2.8)  mg/dL


 


Total Bilirubin  0.6  (0.2-1.0)  mg/dL


 


AST  40  (10-42)  IU/L


 


ALT  18  (10-60)  IU/L


 


Alkaline Phosphatase  61  ()  IU/L


 


Troponin I   (<0.49)  ng/mL


 


B-Natriuretic Peptide   (5-100)  pg/mL


 


Total Protein  6.0 L  (6.7-8.2)  g/dL


 


Albumin  3.2  (3.2-5.5)  g/dL


 


Globulin  2.8  (2.1-4.2)  g/dL


 


Albumin/Globulin Ratio  1.1  (1.0-2.2)  


 


Urine Color   


 


Urine Clarity   (CLEAR)  


 


Urine pH   (5.0-7.5)  PH


 


Ur Specific Gravity   (1.002-1.030)  


 


Urine Protein   (NEGATIVE)  mg/dL


 


Urine Glucose (UA)   (NEGATIVE)  mg/dL


 


Urine Ketones   (NEGATIVE)  mg/dL


 


Urine Occult Blood   (NEGATIVE)  


 


Urine Nitrite   (NEGATIVE)  


 


Urine Bilirubin   (NEGATIVE)  


 


Urine Urobilinogen   (NORMAL)  E.U./dL


 


Ur Leukocyte Esterase   (NEGATIVE)  


 


Ur Microscopic Review   


 


Urine Culture Comments   














Assessment/Plan





- Problem List


(1) Status post total hip replacement, right


Impression: 


stable postop with respect to right hip replacement.


Acute respiratory distress.





Plan:


1. hospitalists to transfer patient to ICU for BPAP treatment.  Workup in 

progress for potential PE.


2. continue mobilization with physical therapy.

## 2017-07-12 NOTE — ULTRASOUND PRELIMINARY REPORT
Accession: [id number]

Exam: US Retroperitoneal

 

IMPRESSION: No significant renal abnormality. No evident hydronephrosis.

 

[facility]

 

SITE ID: [id number]

## 2017-07-12 NOTE — PROVIDER PROGRESS NOTE
Subjective





- Prog Note Date


Prog Note Date: 07/12/17


Prog Note Time: 09:10





- Subjective


Pt reports feeling: Improved


Subjective: 





Patient sitting up in the chair, he has eaten breakfast.  He reports terrible 

pain last night following lots a trips to and from the bathroom.  Reports 

mulitple loose BMs.  His abdomen feels rounded and firm and he tells he "this 

is normal".  He denies nausea.  His shortness of breath seems worse to me but 

he says "it's fine".  He starts having wheezing during out conversation and he 

says "that's cause I just ate".  He is fidgiting in the chair, cannot get 

comfortable.  





Seen again at 1145, patient more relaxed but increased resp rate and more 

audible coarse breath sounds.  RT at bedside to draw ABG.





Current Medications





- Current Medications


Current Medications: 








Active Medications





Acetaminophen (Tylenol)  1,000 mg PO TIDWM [facility]


   Last Admin: 07/12/17 08:40 Dose:  1,000 mg


Albuterol ()  2.5 mg INH Q4H PRN


   PRN Reason: Shortness of Air/Wheezing


Albuterol/Ipratropium (Duoneb)  3 ml INH RTQID [facility]


   Last Admin: 07/12/17 11:26 Dose:  3 ml


Bisacodyl (Dulcolax Supp)  10 mg [place] Q12H PRN


   PRN Reason: Constipation


Bisacodyl (Dulcolax)  10 mg PO Q12H PRN


   PRN Reason: Constipation


Cyclobenzaprine HCl (Flexeril)  10 mg PO TID PRN


   PRN Reason: Spasms


Diphenhydramine HCl (Benadryl)  25 mg PO Q6H PRN


   PRN Reason: ITCHING


Diphenhydramine HCl (Benadryl Inj)  25 mg IVP Q6H PRN


   PRN Reason: ITCHING


Docusate Sodium (Colace 100mg Capsule)  100 mg PO BID [facility]


   Last Admin: 07/12/17 08:40 Dose:  Not Given


Enoxaparin Sodium (Lovenox)  40 mg SUBQ DAILY [facility]


   Last Admin: 07/12/17 08:41 Dose:  40 mg


Gabapentin (Neurontin)  300 mg PO BID [facility]


   Last Admin: 07/12/17 08:41 Dose:  Not Given


Hydroxyzine Pamoate (Vistaril)  50 mg PO Q4H PRN


   PRN Reason: Anxiety


Losartan Potassium (Cozaar)  50 mg PO DAILY [facility]


   Last Admin: 07/12/17 09:40 Dose:  Not Given


Non-Formulary Medication (Fluticasone/Vilanterol [Breo Ellipta 100-25 Mcg Inh])

  1 puffs INH RTDAILY [facility]


   Last Admin: 07/12/17 07:59 Dose:  1 puffs


Ondansetron HCl (Zofran Inj)  4 mg IVP Q6HR PRN


   PRN Reason: Nausea / Vomiting


Oxycodone HCl (Roxicodone)  5 - 10 mg PO Q4HR PRN


   PRN Reason: PAIN


   Last Admin: 07/12/17 04:32 Dose:  10 mg


Prochlorperazine Edisylate (Compazine Inj)  10 mg IVP Q6HR PRN


   PRN Reason: Nausea / Vomiting


Senna (Senokot)  17.2 mg PO BID PRN


   PRN Reason: CONSTIPATION


Sodium Chloride (Normal Saline Flush 0.9%)  10 ml IVP PRN PRN


   PRN Reason: AS NEEDED PER PROVIDER ORDERS


Sodium Chloride (Normal Saline Flush 0.9%)  10 ml IVP Q8HR [facility]


   Last Admin: 07/12/17 05:48 Dose:  10 ml





 





Ibuprofen 800 mg PO Q8H PRN 04/25/17 


Lisinopril 40 mg PO DAILY 04/25/17 


Hydroxyzine Pamoate [Vistaril] 50 mg PO Q4H PRN MDD 4 caps 06/27/17 


Cyclobenzaprine [Flexeril] 10 mg PO TID PRN 07/10/17 


Fluticasone/Vilanterol [Breo Ellipta 100-25 Mcg INH] 1 puffs INH DAILY 07/10/17 


Gabapentin [Neurontin] 300 mg PO BID 07/10/17 


HYDROcod/ACETAM 5/325 [Norco 5/325] 1 tab PO Q4H PRN 07/10/17 











Objective





- Vital Signs/Intake & Output


Reviewed Vital Signs: Yes


Vital Signs: 





 Vital Signs x48h











  Temp Pulse Pulse Resp BP BP Pulse Ox


 


 07/12/17 08:22  37.5 C   119 H  20   108/68  93


 


 07/12/17 08:08   110 H   20   


 


 07/12/17 04:52  36.8 C   118 H  20  146/82 H   92











Intake & Output: 





 Intake & Output











 07/09/17 07/10/17 07/11/17 07/12/17





 23:59 23:59 23:59 23:59


 


Intake Total  2485 3656 940


 


Output Total  350 1020 800


 


Balance  2135 2636 140














- Objective


General Appearance: positive: No acute distress, Alert


Eyes Bilateral: positive: PERRL


ENT: positive: No signs of dehydration


Neck: positive: No JVD


Respiratory: positive: Chest non-tender, Rales, Rhonchi.  negative: Breath 

sounds nml (Bilateral coarse wheezy breath sounds, increased resp rate and WOB, 

increased O2 needs.)


Cardiovascular: positive: Regular rate & rhythm, Tachycardia, Other (+1 BLE 

pitting edema)


Peripheral Pulses: 2+ Radial (R), 2+ Radial (L), 2+ Dorsalis pedis (R), 2+ 

Dorsalis pedis (L)


Abdomen: positive: Non-tender (distended, firom, hypoactive bowel sounds.).  

negative: Guarding, Rebound


Skin: positive: Other (farhad complexion, skin warm and dry in the morning, then 

becoming clammy and warmer in the afternoon)


Extremities: positive: Other (right hip with mild swelling, no bleeding, 

dressing CDI, distal CMS intact, transferring and ambulating with)


Neurologic/Psychiatric: positive: Oriented x3, Mood/affect nml (anxious, 

restless, conversant)





- Lab Results


Fish Bones: 


 07/11/17 20:38





 07/12/17 12:20


Other Labs: 





 Lab Results x24hrs











  07/12/17 07/12/17 07/11/17 Range/Units





  06:06 06:06 22:15 


 


WBC     (4.8-10.8)  x10^3/uL


 


RBC     (4.70-6.10)  10^6/uL


 


Hgb     (14.0-18.0)  g/dL


 


Hct     (42.0-52.0)  %


 


MCV     (80.0-94.0)  fL


 


MCH     (27.0-31.0)  pg


 


MCHC     (32.0-36.0)  g/dL


 


RDW     (12.0-15.0)  %


 


Plt Count     (130-450)  10^3/uL


 


MPV     (7.4-11.4)  fL


 


Neut #     (1.5-6.6)  10^3/uL


 


Lymph #     (1.5-3.5)  10^3/uL


 


Mono #     (0.0-1.0)  10^3/uL


 


Eos #     (0.0-0.7)  10^3/uL


 


Baso #     (0.0-0.1)  10^3/uL


 


Absolute Nucleated RBC     x10^3/uL


 


Nucleated RBCs     /100WBC


 


VBG pH     (7.31-7.41)  


 


Ionized Calcium     (1.15-1.33)  mmol/L


 


Sodium   134 L   (135-145)  mmol/L


 


Potassium   5.7 H   (3.5-5.0)  mmol/L


 


Chloride   103   (101-111)  mmol/L


 


Carbon Dioxide   27   (21-32)  mmol/L


 


Anion Gap   4.0 L   (6-13)  


 


BUN   42 H   (6-20)  mg/dL


 


Creatinine   1.1   (0.6-1.2)  mg/dL


 


Estimated GFR (MDRD)   67 L   (>89)  


 


Glucose   145 H   ()  mg/dL


 


Lactic Acid     (0.5-2.2)  mmol/L


 


Calcium   8.0 L   (8.5-10.3)  mg/dL


 


Phosphorus  2.3 L    (2.5-4.6)  mg/dL


 


Magnesium  2.0    (1.7-2.8)  mg/dL


 


Total Bilirubin     (0.2-1.0)  mg/dL


 


AST     (10-42)  IU/L


 


ALT     (10-60)  IU/L


 


Alkaline Phosphatase     ()  IU/L


 


Troponin I     (<0.49)  ng/mL


 


Total Protein     (6.7-8.2)  g/dL


 


Albumin     (3.2-5.5)  g/dL


 


Globulin     (2.1-4.2)  g/dL


 


Albumin/Globulin Ratio     (1.0-2.2)  


 


Urine Color    YELLOW  


 


Urine Clarity    CLEAR  (CLEAR)  


 


Urine pH    5.5  (5.0-7.5)  PH


 


Ur Specific Gravity    1.025  (1.002-1.030)  


 


Urine Protein    NEGATIVE  (NEGATIVE)  mg/dL


 


Urine Glucose (UA)    NEGATIVE  (NEGATIVE)  mg/dL


 


Urine Ketones    TRACE  (NEGATIVE)  mg/dL


 


Urine Occult Blood    NEGATIVE  (NEGATIVE)  


 


Urine Nitrite    NEGATIVE  (NEGATIVE)  


 


Urine Bilirubin    NEGATIVE  (NEGATIVE)  


 


Urine Urobilinogen    0.2 (NORMAL)  (NORMAL)  E.U./dL


 


Ur Leukocyte Esterase    NEGATIVE  (NEGATIVE)  


 


Ur Microscopic Review    NOT INDICATED  


 


Urine Culture Comments    NOT INDICATED  














  07/11/17 07/11/17 07/11/17 Range/Units





  20:38 20:38 20:38 


 


WBC     (4.8-10.8)  x10^3/uL


 


RBC     (4.70-6.10)  10^6/uL


 


Hgb     (14.0-18.0)  g/dL


 


Hct     (42.0-52.0)  %


 


MCV     (80.0-94.0)  fL


 


MCH     (27.0-31.0)  pg


 


MCHC     (32.0-36.0)  g/dL


 


RDW     (12.0-15.0)  %


 


Plt Count     (130-450)  10^3/uL


 


MPV     (7.4-11.4)  fL


 


Neut #     (1.5-6.6)  10^3/uL


 


Lymph #     (1.5-3.5)  10^3/uL


 


Mono #     (0.0-1.0)  10^3/uL


 


Eos #     (0.0-0.7)  10^3/uL


 


Baso #     (0.0-0.1)  10^3/uL


 


Absolute Nucleated RBC     x10^3/uL


 


Nucleated RBCs     /100WBC


 


VBG pH  7.248 L    (7.31-7.41)  


 


Ionized Calcium  1.12 L    (1.15-1.33)  mmol/L


 


Sodium     (135-145)  mmol/L


 


Potassium     (3.5-5.0)  mmol/L


 


Chloride     (101-111)  mmol/L


 


Carbon Dioxide     (21-32)  mmol/L


 


Anion Gap     (6-13)  


 


BUN     (6-20)  mg/dL


 


Creatinine     (0.6-1.2)  mg/dL


 


Estimated GFR (MDRD)     (>89)  


 


Glucose     ()  mg/dL


 


Lactic Acid     (0.5-2.2)  mmol/L


 


Calcium     (8.5-10.3)  mg/dL


 


Phosphorus    5.8 H  (2.5-4.6)  mg/dL


 


Magnesium    2.0  (1.7-2.8)  mg/dL


 


Total Bilirubin     (0.2-1.0)  mg/dL


 


AST     (10-42)  IU/L


 


ALT     (10-60)  IU/L


 


Alkaline Phosphatase     ()  IU/L


 


Troponin I   < 0.04   (<0.49)  ng/mL


 


Total Protein     (6.7-8.2)  g/dL


 


Albumin     (3.2-5.5)  g/dL


 


Globulin     (2.1-4.2)  g/dL


 


Albumin/Globulin Ratio     (1.0-2.2)  


 


Urine Color     


 


Urine Clarity     (CLEAR)  


 


Urine pH     (5.0-7.5)  PH


 


Ur Specific Gravity     (1.002-1.030)  


 


Urine Protein     (NEGATIVE)  mg/dL


 


Urine Glucose (UA)     (NEGATIVE)  mg/dL


 


Urine Ketones     (NEGATIVE)  mg/dL


 


Urine Occult Blood     (NEGATIVE)  


 


Urine Nitrite     (NEGATIVE)  


 


Urine Bilirubin     (NEGATIVE)  


 


Urine Urobilinogen     (NORMAL)  E.U./dL


 


Ur Leukocyte Esterase     (NEGATIVE)  


 


Ur Microscopic Review     


 


Urine Culture Comments     














  07/11/17 07/11/17 07/11/17 Range/Units





  20:38 20:38 20:38 


 


WBC  7.7    (4.8-10.8)  x10^3/uL


 


RBC  3.32 L    (4.70-6.10)  10^6/uL


 


Hgb  10.8 L    (14.0-18.0)  g/dL


 


Hct  32.1 L    (42.0-52.0)  %


 


MCV  96.8 H    (80.0-94.0)  fL


 


MCH  32.5 H    (27.0-31.0)  pg


 


MCHC  33.5    (32.0-36.0)  g/dL


 


RDW  14.5    (12.0-15.0)  %


 


Plt Count  210    (130-450)  10^3/uL


 


MPV  8.1    (7.4-11.4)  fL


 


Neut #  6.1    (1.5-6.6)  10^3/uL


 


Lymph #  0.8 L    (1.5-3.5)  10^3/uL


 


Mono #  0.7    (0.0-1.0)  10^3/uL


 


Eos #  0.1    (0.0-0.7)  10^3/uL


 


Baso #  0.1    (0.0-0.1)  10^3/uL


 


Absolute Nucleated RBC  0.01    x10^3/uL


 


Nucleated RBCs  0.1    /100WBC


 


VBG pH     (7.31-7.41)  


 


Ionized Calcium    YES  (1.15-1.33)  mmol/L


 


Sodium    136  (135-145)  mmol/L


 


Potassium    5.8 H  (3.5-5.0)  mmol/L


 


Chloride    99 L  (101-111)  mmol/L


 


Carbon Dioxide    29  (21-32)  mmol/L


 


Anion Gap    8.0  (6-13)  


 


BUN    47 H  (6-20)  mg/dL


 


Creatinine    2.2 H  (0.6-1.2)  mg/dL


 


Estimated GFR (MDRD)    30 L  (>89)  


 


Glucose    149 H  ()  mg/dL


 


Lactic Acid   1.4   (0.5-2.2)  mmol/L


 


Calcium    7.9 L  (8.5-10.3)  mg/dL


 


Phosphorus     (2.5-4.6)  mg/dL


 


Magnesium     (1.7-2.8)  mg/dL


 


Total Bilirubin    0.6  (0.2-1.0)  mg/dL


 


AST    40  (10-42)  IU/L


 


ALT    18  (10-60)  IU/L


 


Alkaline Phosphatase    61  ()  IU/L


 


Troponin I     (<0.49)  ng/mL


 


Total Protein    6.0 L  (6.7-8.2)  g/dL


 


Albumin    3.2  (3.2-5.5)  g/dL


 


Globulin    2.8  (2.1-4.2)  g/dL


 


Albumin/Globulin Ratio    1.1  (1.0-2.2)  


 


Urine Color     


 


Urine Clarity     (CLEAR)  


 


Urine pH     (5.0-7.5)  PH


 


Ur Specific Gravity     (1.002-1.030)  


 


Urine Protein     (NEGATIVE)  mg/dL


 


Urine Glucose (UA)     (NEGATIVE)  mg/dL


 


Urine Ketones     (NEGATIVE)  mg/dL


 


Urine Occult Blood     (NEGATIVE)  


 


Urine Nitrite     (NEGATIVE)  


 


Urine Bilirubin     (NEGATIVE)  


 


Urine Urobilinogen     (NORMAL)  E.U./dL


 


Ur Leukocyte Esterase     (NEGATIVE)  


 


Ur Microscopic Review     


 


Urine Culture Comments     














Assessment/Plan





- Problem List


(1) Status post total hip replacement, right


Impression: 


Hx of right hip OA and NSAID dependence. Now POD1 from elective R SANTY.  

Surgical site with mild swelling, no bleeding.  Pain present, becomes 

uncontrolled with 


   -WBAT


   -PT/OT eval and treat


   -Lovenox for DVT prophylaxis + foot pumps


   -Pain control: tylenol TID, lidoderm patch and bid gabapentin w/ prn toradol

, oxycodone and flexeril


   -Bowel regimen: multiple Bms last night, continue colace BID (hold for loose 

stools)





(2) Moderate COPD (chronic obstructive pulmonary disease) with respiratory 

acidosis and UNCOMPENSATED ACUTE HYPOXIC RESPIRATORY FAILURE REQUIRING BIPAP


Impression: 


Hx of COPD on Breo/ellipita and albuterol at home.  Quit smoking 3mo ago.   On 

O2 overnight after surgery, then RA. PM of 7/11 developed lethargy, hypotensive 

and requiring oxygen.  Given 500ml NS, labs obtained (see below). NEW MILAN with 

creat 2.2, phos 5.8 and K elevated 5.8.  CXR without acute pathology.  Repeat 

AM labs with creat back to baseline (1.1) K still 5.7, phos 2.3.  He was still 

on O2 and drowsy but denying symptoms.  Had received 1mg dilaudid at 6 and 8am.

  Respiratory effort/rate increased and ABG obtained at noon.  Uncompensated 

respiratory acidosis 7.27/55/25/pO2 63.  Concern with fluid overload based on 

exam of crackles and LE edema and bolus overnight.  Consider CTA to rule out PE 

with recent sugery: Creat 1.7. Defer to Dr Post re: VQ scan vs empiric 

treatment till kidney function improves.


   -CXR now


   -Lasix 40mg IV x1 now


   -Check BNP and ECHO to rule out HF 


   -Transfer to ICU for BIPAP, signout report to Dr Post who will assume care


   -Continue Breo/ellipita, scheduled duonebs and prn albuterol





(3) Acute renal failure with hyperkalemia


Baseline creat 1.0, overnight labs 7/11 creat up to 2.2.  Risks include 

transient hypotension, NSAID use (he was previously on motrin TID prior to 

surgery), ACE-I, or dehydration.  Given 500ml NS, BP improved.  Toradol D/C'd.  

ACE-I stopped and changed to losartan (which was held due to SBP <115).  Has 

had persistent tachycardia (<120bpm).    Creat improved to 1.1 this AM and phos 

down to 2.3.  Repeat labs with increasing resp distress and Creat 1.7/K6.0/AG 

7.  Unsure etiology of this transient recurrent renal failure.


   -Lasix 40mg IV x1 (for fluid overload prior to lab results known)


   -Check FeNa (will only be helpful if rsults <1%, since he already got lasix)


   -Lab unable to run urine urea


   -Trend elytes and renal function


   


(4) Anxiety


Impression: 


Anxiety, multifactorial - recently quit smoking, pain from surgery.


   -PRN vistaril


   -Control pain as noted above


   -Coaching on breathing techniques





(5) HTN (hypertension)


Impression: 


Hx of HTN, on lisinopril at home.  Hypotensive overnight 7/11, improved with 

fluids.  Now normotensive but new MILNA and hyperkalemia.


   -DC lisinopril


   -Losartan 50mg daily, hold for SBP <120


   -Monitor BP routinely and titrate meds as needed

## 2017-07-13 LAB
ABG EXPIRATORY POS AIRWAY P: 5 CMH2O
ABG INSPIRATORY POS AIRWAY P: 15 CMH2O
ABG SITE OF DRAW: (no result)
ALBUMIN/GLOB SERPL: 0.9 {RATIO} (ref 1–2.2)
ANION GAP SERPL CALCULATED.4IONS-SCNC: 4 MMOL/L (ref 6–13)
ARTERIAL PATENCY WRIST A: POSITIVE
BASE EXCESS BLDMV CALC-SCNC: 0 MMOL/L (ref -2–3)
BASOPHILS NFR BLD AUTO: 0 10^3/UL (ref 0–0.1)
BASOPHILS NFR BLD AUTO: 0.5 %
BILIRUB BLD-MCNC: 0.6 MG/DL (ref 0.2–1)
BUN SERPL-MCNC: 41 MG/DL (ref 6–20)
CA-I SERPL ISE-MCNC: 1.18 MMOL/L (ref 1.15–1.33)
CALCIUM UR-MCNC: 8 MG/DL (ref 8.5–10.3)
CHLORIDE SERPL-SCNC: 106 MMOL/L (ref 101–111)
CO2 BLDA CALC-SCNC: 29 MMOL/L (ref 21–29)
CO2 SERPL-SCNC: 26 MMOL/L (ref 21–32)
CREAT SERPLBLD-SCNC: 1 MG/DL (ref 0.6–1.2)
DEPRECATED HCO3 PLAS-SCNC: 27.3 MMOL/L (ref 22–26)
EOSINOPHIL # BLD AUTO: 0.2 10^3/UL (ref 0–0.7)
EOSINOPHIL NFR BLD AUTO: 2.8 %
ERYTHROCYTE [DISTWIDTH] IN BLOOD BY AUTOMATED COUNT: 14.2 % (ref 12–15)
GFRSERPLBLD MDRD-ARVRAT: 75 ML/MIN/{1.73_M2} (ref 89–?)
GLOBULIN SER-MCNC: 3.1 G/DL (ref 2.1–4.2)
GLUCOSE SERPL-MCNC: 123 MG/DL (ref 70–100)
HCT VFR BLD AUTO: 29.1 % (ref 42–52)
HGB UR QL STRIP: 9.7 G/DL (ref 14–18)
INHALED O2 CONCENTRATION: 18 B/MIN
LYMPHOCYTES # SPEC AUTO: 1.1 10^3/UL (ref 1.5–3.5)
LYMPHOCYTES NFR BLD AUTO: 14.3 %
MAGNESIUM SERPL-MCNC: 2 MG/DL (ref 1.7–2.8)
MCH RBC QN AUTO: 32.5 PG (ref 27–31)
MCHC RBC AUTO-ENTMCNC: 33.4 G/DL (ref 32–36)
MCV RBC AUTO: 97.4 FL (ref 80–94)
MONOCYTES # BLD AUTO: 1 10^3/UL (ref 0–1)
MONOCYTES NFR BLD AUTO: 12.2 %
NEUTROPHILS # BLD AUTO: 5.6 10^3/UL (ref 1.5–6.6)
NEUTROPHILS # SNV AUTO: 8 X10^3/UL (ref 4.8–10.8)
NEUTROPHILS NFR BLD AUTO: 70.2 %
NRBC # BLD AUTO: 0 /100WBC
PCO2 TEMP ADJ BLDCOA: 60 MMHG (ref 34–45)
PDW BLD AUTO: 7.8 FL (ref 7.4–11.4)
PH BLDV: 7.29 [PH] (ref 7.31–7.41)
PH TEMP ADJ BLDA: 7.27 [PH] (ref 7.35–7.45)
PHOSPHATE BLD-MCNC: 2.8 MG/DL (ref 2.5–4.6)
PO2 TEMP ADJ BLDCOA: 81 MMHG (ref 80–100)
POTASSIUM SERPL-SCNC: 5.2 MMOL/L (ref 3.5–5)
PROT SPEC-MCNC: 5.9 G/DL (ref 6.7–8.2)
RBC MAR: 2.98 10^6/UL (ref 4.7–6.1)
SAO2 % BLDA FROM PO2: 94 % (ref 94–98)
SAO2 % BLDA PULSEOX: 95 %
SODIUM SERPLBLD-SCNC: 136 MMOL/L (ref 135–145)
VENTILATION MODE VENT: (no result)
WBC # BLD: 8 X10^3/UL

## 2017-07-13 RX ADMIN — OXYCODONE PRN MG: 5 TABLET ORAL at 18:42

## 2017-07-13 RX ADMIN — SODIUM CHLORIDE SCH MG: 9 INJECTION, SOLUTION INTRAVENOUS at 06:48

## 2017-07-13 RX ADMIN — GABAPENTIN SCH: 300 CAPSULE ORAL at 09:52

## 2017-07-13 RX ADMIN — LORAZEPAM PRN MG: 2 INJECTION, SOLUTION INTRAMUSCULAR; INTRAVENOUS at 21:16

## 2017-07-13 RX ADMIN — ENOXAPARIN SODIUM SCH MG: 150 INJECTION SUBCUTANEOUS at 23:39

## 2017-07-13 RX ADMIN — ACETAMINOPHEN SCH MG: 500 TABLET ORAL at 18:42

## 2017-07-13 RX ADMIN — SODIUM CHLORIDE SCH: 9 INJECTION, SOLUTION INTRAVENOUS at 18:26

## 2017-07-13 RX ADMIN — SODIUM CHLORIDE, PRESERVATIVE FREE SCH ML: 5 INJECTION INTRAVENOUS at 15:17

## 2017-07-13 RX ADMIN — METHYLPREDNISOLONE SODIUM SUCCINATE SCH MG: 40 INJECTION, POWDER, FOR SOLUTION INTRAMUSCULAR; INTRAVENOUS at 10:35

## 2017-07-13 RX ADMIN — METHYLPREDNISOLONE SODIUM SUCCINATE SCH MG: 40 INJECTION, POWDER, FOR SOLUTION INTRAMUSCULAR; INTRAVENOUS at 15:17

## 2017-07-13 RX ADMIN — DOCUSATE SODIUM SCH: 100 CAPSULE, LIQUID FILLED ORAL at 21:05

## 2017-07-13 RX ADMIN — IPRATROPIUM BROMIDE AND ALBUTEROL SULFATE SCH ML: 2.5; .5 SOLUTION RESPIRATORY (INHALATION) at 19:30

## 2017-07-13 RX ADMIN — OXYCODONE PRN MG: 5 TABLET ORAL at 13:23

## 2017-07-13 RX ADMIN — ENOXAPARIN SODIUM SCH MG: 150 INJECTION SUBCUTANEOUS at 14:59

## 2017-07-13 RX ADMIN — MORPHINE SULFATE PRN MG: 2 INJECTION, SOLUTION INTRAMUSCULAR; INTRAVENOUS at 12:06

## 2017-07-13 RX ADMIN — METHYLPREDNISOLONE SODIUM SUCCINATE SCH MG: 40 INJECTION, POWDER, FOR SOLUTION INTRAMUSCULAR; INTRAVENOUS at 21:04

## 2017-07-13 RX ADMIN — SODIUM CHLORIDE, PRESERVATIVE FREE SCH ML: 5 INJECTION INTRAVENOUS at 23:38

## 2017-07-13 RX ADMIN — ACETAMINOPHEN SCH MG: 500 TABLET ORAL at 13:23

## 2017-07-13 RX ADMIN — ENOXAPARIN SODIUM SCH MG: 100 INJECTION SUBCUTANEOUS at 09:56

## 2017-07-13 RX ADMIN — MORPHINE SULFATE PRN MG: 2 INJECTION, SOLUTION INTRAMUSCULAR; INTRAVENOUS at 02:21

## 2017-07-13 RX ADMIN — OXYCODONE PRN MG: 5 TABLET ORAL at 10:30

## 2017-07-13 RX ADMIN — MORPHINE SULFATE PRN MG: 2 INJECTION, SOLUTION INTRAMUSCULAR; INTRAVENOUS at 09:36

## 2017-07-13 RX ADMIN — CHLORHEXIDINE GLUCONATE SCH: 1.2 SOLUTION ORAL at 21:06

## 2017-07-13 RX ADMIN — SODIUM CHLORIDE SCH MLS/HR: 9 INJECTION, SOLUTION INTRAVENOUS at 01:29

## 2017-07-13 RX ADMIN — ACETAMINOPHEN SCH MG: 500 TABLET ORAL at 09:52

## 2017-07-13 RX ADMIN — IPRATROPIUM BROMIDE AND ALBUTEROL SULFATE SCH ML: 2.5; .5 SOLUTION RESPIRATORY (INHALATION) at 07:20

## 2017-07-13 RX ADMIN — SODIUM CHLORIDE, PRESERVATIVE FREE SCH ML: 5 INJECTION INTRAVENOUS at 21:05

## 2017-07-13 RX ADMIN — SODIUM CHLORIDE SCH MLS/HR: 9 INJECTION, SOLUTION INTRAVENOUS at 11:50

## 2017-07-13 RX ADMIN — DOCUSATE SODIUM SCH: 100 CAPSULE, LIQUID FILLED ORAL at 09:52

## 2017-07-13 RX ADMIN — OXYCODONE PRN MG: 5 TABLET ORAL at 23:40

## 2017-07-13 RX ADMIN — WARFARIN SODIUM SCH MG: 5 TABLET ORAL at 14:58

## 2017-07-13 RX ADMIN — IPRATROPIUM BROMIDE AND ALBUTEROL SULFATE SCH ML: 2.5; .5 SOLUTION RESPIRATORY (INHALATION) at 10:50

## 2017-07-13 RX ADMIN — CHLORHEXIDINE GLUCONATE SCH ML: 1.2 SOLUTION ORAL at 09:56

## 2017-07-13 RX ADMIN — IPRATROPIUM BROMIDE AND ALBUTEROL SULFATE SCH ML: 2.5; .5 SOLUTION RESPIRATORY (INHALATION) at 15:40

## 2017-07-13 RX ADMIN — MORPHINE SULFATE PRN MG: 2 INJECTION, SOLUTION INTRAMUSCULAR; INTRAVENOUS at 04:31

## 2017-07-13 RX ADMIN — SODIUM CHLORIDE, PRESERVATIVE FREE SCH ML: 5 INJECTION INTRAVENOUS at 06:48

## 2017-07-13 NOTE — PROVIDER PROGRESS NOTE
Assessment/Plan





- Problem List


(1) Acute respiratory failure with hypoxia and hypercapnia


Assessment/Plan: 





Secondary to pulmonary embolism, COPD and decreased respiratory drive from 

opioids 


Patient was placed on BiPAP overnight although PCO2 was not improved patient 

clinically much better with improved mentation 


CTA of lungs revealed segmental PEs


Patient wheezy this am so started on IV steroids


Started on lovenox for PE


BiPAP discontinued 


Patient likely has MAURICIO needs outpatient sleep study


Improving


Will transfer to Med/Surg








(2) Pulmonary Embolism


Impression: 


Patient had acute respiratory failure on 07/12/17 found to have hypercapnia and 

hypoxia 


CXR negative


D dimer elevated and patient had tachycardia with recent surgery


CTA of the thorax revealed subsegmental PE


Patient started on lovenox and coumadin


Daily INR





(3) MILAN (acute kidney injury)


Impression: 


FeNa is less than 1 this appears pre-renal likely secondary to hypotension last 

night as well as blood loss and fluid deficit from surgery 


Patients K is 6.0 and he has some mild acidosis but making good urine


Crt up to 1.7 


IVFs


Avoid nephrotoxic agents


Improved to 1.0 and able to do CTA today 


Continue to monitor 








(4) Hyperkalemia


Impression: 


Patient has chronic hyperkalemia likely secondary to lisinopril


Lisinopril stopped


Renal failure improved


K down to 5.2








(5) Acute encephalopathy


Impression: 


Secondary to hypercapnia and sedation from pain meds


Resolved








(6) Acute blood loss as cause of postoperative anemia


Impression: 


Secondary to surgery 


Patient with blood loss anemia


Slowly trending down to 9.7 today


Starting lovenox and coumadin for PE


Monitor closely 








(7) Status post total hip replacement, right


Impression: 


POD#2


Pain controlled


PT to evaluate for possible SNF placement 


Ortho on board 











(8) Moderate COPD (chronic obstructive pulmonary disease)


Impression: 


Wheezing this am 


Did have hypercapnia and hypoxia


Continue Duonebs and start IV steroids today 


Supplemental O2  








(9) HTN (hypertension)


Impression: 


BP well controlled 


Hold lisinopril secondary to MILAN and hyperkalemia 


Qualifiers: 


   Hypertension type: essential hypertension   Qualified Code(s): I10 - 

Essential (primary) hypertension   





(10) Prophylactic use of low molecular weight heparin for venous thromboembolism


Impression: 


Lovenox for prophylaxis 








- Current Meds


Current Meds: 





 Current Medications











Generic Name Dose Route Start Last Admin





  Trade Name Freq  PRN Reason Stop Dose Admin


 


Acetaminophen  1,000 mg 07/11/17 12:00 07/13/17 13:23





  Tylenol  PO   1,000 mg





  TIDWM SAM   Administration


 


Albuterol/Ipratropium  3 ml 07/10/17 19:00 07/13/17 10:50





  Duoneb  INH   3 ml





  RTQID SAM   Administration


 


Chlorhexidine Gluconate  15 ml 07/12/17 21:00 07/13/17 09:56





  Peridex  PO   15 ml





  BID SAM   Administration


 


Docusate Sodium  100 mg 07/11/17 10:00 07/13/17 09:52





  Colace 100mg Capsule  PO   Not Given





  BID SAM   


 


Enoxaparin Sodium  125 mg 07/13/17 15:00 07/13/17 14:59





  Lovenox  SUBQ   125 mg





  BID SAM   Administration


 


Gabapentin  300 mg 07/10/17 21:00 07/13/17 09:52





  Neurontin  PO   Not Given





  BID SAM   


 


Hydromorphone HCl  1 mg 07/13/17 13:48 07/13/17 15:26





  Dilaudid Inj  IVP   1 mg





  Q2HR PRN   Administration





  PAIN   


 


Sodium Chloride  1,000 mls @ 125 mls/hr 07/12/17 17:00 07/13/17 11:50





  Normal Saline 0.9%  IV   125 mls/hr





  .Q8H SAM   Administration


 


Lorazepam  0.5 mg 07/12/17 15:21 07/12/17 20:11





  Ativan Inj  IVP   0.5 mg





  Q2HR PRN   Administration





  Anxiety   


 


Methylprednisolone  40 mg 07/13/17 11:00 07/13/17 15:17





  Solu-Medrol (40mg Vial)  IVP   40 mg





  TID SAM   Administration


 


Non-Formulary Medication  1 puffs 07/11/17 10:00 07/13/17 07:20





  Fluticasone/Vilanterol [Breo Ellipta 100-25 Mcg Inh]  INH   1 puffs





  RTDAILY SAM   Administration


 


Oxycodone HCl  5 - 10 mg 07/11/17 13:30 07/13/17 13:23





  Roxicodone  PO   5 mg





  Q4HR PRN   Administration





  PAIN   


 


Pantoprazole Sodium  40 mg 07/13/17 07:00 07/13/17 06:48





  Protonix  IVP   40 mg





  QDAC SAM   Administration


 


Sodium Chloride  10 ml 07/10/17 17:00 07/12/17 15:32





  Normal Saline Flush 0.9%  IVP   10 ml





  PRN PRN   Administration





  AS NEEDED PER PROVIDER ORDERS   


 


Sodium Chloride  10 ml 07/10/17 22:00 07/13/17 15:17





  Normal Saline Flush 0.9%  IVP   10 ml





  Q8HR SAM   Administration


 


Warfarin Sodium  5 mg 07/13/17 15:00 07/13/17 14:58





  Coumadin  PO   5 mg





  QDWARFARIN SAM   Administration














- Lab Result


Lab results reviewed: Yes


Fish Bone Diagrams: 


 07/13/17 04:50





 07/13/17 04:50





- EKG Results


EKG Interpreted Independently: Yes





- Diagnostic Imaging Results


Diagnostic Imaging Results: Final report reviewed





- Additional Planning


Condition/Complexity: Guarded


My Orders: 





My Active Orders





07/12/17 15:21


LORazepam INJ [Ativan Inj]   0.5 mg IVP Q2HR PRN 





07/12/17 17:00


Sodium Chloride 0.9% [Normal Saline 0.9%] 1,000 ml  mls/hr 





07/13/17 11:00


methylPREDNISolone SUCCINATE [SOLU-Medrol (40MG VIAL)]   40 mg IVP TID 





07/13/17 14:00


Chest Angio (PE) [CT] Stat 





07/13/17 14:42


Admit \ Transfer \ Status [RC] ONCE 





07/13/17 14:45


Vital Signs [RC] QSHIFT 





07/13/17 15:00


Enoxaparin [Lovenox]   125 mg SUBQ BID 


Warfarin [Coumadin]   5 mg PO QDWARFARIN 





07/14/17 05:00


CBC - COMP BLD CT W/AUTO DIFF [HEME] DAILYLAB 


CMP, RFLX TO IONIZED CA IF [CHEM] DAILYLAB 


MAGNESIUM [CHEM] DAILYLAB 


PHOSPHORUS [CHEM] DAILYLAB 





07/15/17 05:00


CBC - COMP BLD CT W/AUTO DIFF [HEME] DAILYLAB 


CMP, RFLX TO IONIZED CA IF [CHEM] DAILYLAB 


MAGNESIUM [CHEM] DAILYLAB 


PHOSPHORUS [CHEM] DAILYLAB 





07/16/17 05:00


CBC - COMP BLD CT W/AUTO DIFF [HEME] DAILYLAB 


CMP, RFLX TO IONIZED CA IF [CHEM] DAILYLAB 


MAGNESIUM [CHEM] DAILYLAB 


PHOSPHORUS [CHEM] DAILYLAB 





07/17/17 05:00


CBC - COMP BLD CT W/AUTO DIFF [HEME] DAILYLAB 


CMP, RFLX TO IONIZED CA IF [CHEM] DAILYLAB 


MAGNESIUM [CHEM] DAILYLAB 


PHOSPHORUS [CHEM] DAILYLAB 











Consult/Specialty: PT


Plan Discussed with:: Patient


Time Spent: 31-60 minutes





Subjective





- Subjective


Patient Reports: Pain (Right hip is controlled), Shortness of Breath (Improved)

, Other (Patient does not want to wear bipap anymore. He is fully lucid and 

alert.)


Nursing Reports: No Complaints





Objective


Vital Signs: 





 Vital Signs - 24 hr











  07/12/17 07/12/17 07/12/17





  16:00 17:00 17:08


 


Temperature 36.9 C  


 


Heart Rate   105 H


 


Heart Rate [   





Apical]   


 


Heart Rate [ 107 H 108 H 





Brachial]   


 


Heart Rate [   





Monitoring   





electrodes]   


 


Respiratory 15 15 





Rate   


 


Blood Pressure 105/50 L 113/54 L 





[Left Brachial   





artery]   


 


O2 Saturation 98 97 














  07/12/17 07/12/17 07/12/17





  17:55 19:00 19:15


 


Temperature   


 


Heart Rate  102 H 105 H


 


Heart Rate [  102 H 





Apical]   


 


Heart Rate [ 109 H  





Brachial]   


 


Heart Rate [   





Monitoring   





electrodes]   


 


Respiratory 16 19 





Rate   


 


Blood Pressure 105/54 L 113/65 





[Left Brachial   





artery]   


 


O2 Saturation 99 95 














  07/12/17 07/12/17 07/12/17





  19:20 20:20 20:45


 


Temperature 36.7 C  


 


Heart Rate   105 H


 


Heart Rate [   





Apical]   


 


Heart Rate [   





Brachial]   


 


Heart Rate [ 103 H 116 H 





Monitoring   





electrodes]   


 


Respiratory 15 23 20





Rate   


 


Blood Pressure 138/64 H 148/61 H 





[Left Brachial   





artery]   


 


O2 Saturation 100 91 L 














  07/12/17 07/12/17 07/12/17





  21:20 22:25 22:45


 


Temperature   


 


Heart Rate   103 H


 


Heart Rate [   





Apical]   


 


Heart Rate [   





Brachial]   


 


Heart Rate [ 109 H 105 H 





Monitoring   





electrodes]   


 


Respiratory 14 15 





Rate   


 


Blood Pressure 127/74 131/71 H 





[Left Brachial   





artery]   


 


O2 Saturation 92 98 














  07/12/17 07/13/17 07/13/17





  23:20 00:20 00:45


 


Temperature 36.7 C  


 


Heart Rate   102 H


 


Heart Rate [   





Apical]   


 


Heart Rate [   





Brachial]   


 


Heart Rate [ 106 H 101 H 





Monitoring   





electrodes]   


 


Respiratory 14 16 





Rate   


 


Blood Pressure 117/72 102/55 L 





[Left Brachial   





artery]   


 


O2 Saturation 97 93 














  07/13/17 07/13/17 07/13/17





  01:20 02:20 02:45


 


Temperature   


 


Heart Rate   100


 


Heart Rate [   





Apical]   


 


Heart Rate [   





Brachial]   


 


Heart Rate [ 97 98 





Monitoring   





electrodes]   


 


Respiratory 17 14 





Rate   


 


Blood Pressure 93/47 L 101/58 L 





[Left Brachial   





artery]   


 


O2 Saturation 97 97 














  07/13/17 07/13/17 07/13/17





  03:20 04:20 04:45


 


Temperature 36.7 C  


 


Heart Rate   99


 


Heart Rate [   





Apical]   


 


Heart Rate [   





Brachial]   


 


Heart Rate [ 101 H 100 





Monitoring   





electrodes]   


 


Respiratory 16 23 





Rate   


 


Blood Pressure 110/71 99/54 L 





[Left Brachial   





artery]   


 


O2 Saturation 95 95 














  07/13/17 07/13/17 07/13/17





  05:20 06:00 06:20


 


Temperature   


 


Heart Rate  93 


 


Heart Rate [   





Apical]   


 


Heart Rate [   





Brachial]   


 


Heart Rate [ 101 H  91





Monitoring   





electrodes]   


 


Respiratory 14  23





Rate   


 


Blood Pressure 109/65  100/66





[Left Brachial   





artery]   


 


O2 Saturation 94  96














  07/13/17 07/13/17 07/13/17





  07:00 07:20 08:00


 


Temperature   37.2 C


 


Heart Rate  103 H 


 


Heart Rate [   





Apical]   


 


Heart Rate [   





Brachial]   


 


Heart Rate [ 96  108 H





Monitoring   





electrodes]   


 


Respiratory 16 17 17





Rate   


 


Blood Pressure 126/93 H  112/84 H





[Left Brachial   





artery]   


 


O2 Saturation 100  91 L














  07/13/17 07/13/17 07/13/17





  09:00 10:00 10:50


 


Temperature   


 


Heart Rate   107 H


 


Heart Rate [   





Apical]   


 


Heart Rate [   





Brachial]   


 


Heart Rate [ 105 H 109 H 





Monitoring   





electrodes]   


 


Respiratory 18 18 15





Rate   


 


Blood Pressure 117/59 L 125/63 





[Left Brachial   





artery]   


 


O2 Saturation 96 96 














  07/13/17 07/13/17 07/13/17





  11:00 12:00 13:00


 


Temperature   36.9 C


 


Heart Rate   


 


Heart Rate [   





Apical]   


 


Heart Rate [   





Brachial]   


 


Heart Rate [ 107 H 103 H 99





Monitoring   





electrodes]   


 


Respiratory 15 23 19





Rate   


 


Blood Pressure 140/81 H 131/71 H 86/66 L





[Left Brachial   





artery]   


 


O2 Saturation 95 94 94














  07/13/17 07/13/17





  14:00 15:00


 


Temperature  


 


Heart Rate  


 


Heart Rate [  





Apical]  


 


Heart Rate [  





Brachial]  


 


Heart Rate [ 96 104 H





Monitoring  





electrodes]  


 


Respiratory 14 20





Rate  


 


Blood Pressure 136/85 H 121/73





[Left Brachial  





artery]  


 


O2 Saturation 91 L 90 L








 Oxygen











O2 Source                      Nasal cannula














I&O (Last 24 Hrs): 





 Intake and Output Totals x24h











 07/11/17 07/12/17 07/13/17





 23:59 23:59 23:59


 


Intake Total 3656 2030 1970


 


Output Total 1020 2375 1425


 


Balance 2636 -345 545











General: Alert, Oriented x3, Cooperative, No acute distress


HEENT: Atraumatic, PERRLA, EOMI, Mucous membr. moist/pink


Neck: Supple, No JVD, No thyromegaly, +2 carotid pulse wo bruit, No LAD


Lymphatic: no adenopathy


Neuro: Alert, Non Focal, CN 2-12 Grossly Intact, Oriented Times 3


Cardiovascular: No murmurs, Other (Tachycardic)


Respiratory: Chest non-tender, Wheezes (bilateral, expiratory)


Abdomen: Normal bowel sounds, Soft, No tenderness, No hepatospenomegaly, Other (

distended)


Extremities: No clubbing, No cyanosis, No edema, Normal pulses, Other (Right 

hip replacement site looks clean, limited ROM)


Skin: No rashes, No breakdown





- Results


Results: 





 Laboratory Results











WBC  8.0 x10^3/uL (4.8-10.8)   07/13/17  04:50    


 


RBC  2.98 10^6/uL (4.70-6.10)  L  07/13/17  04:50    


 


Hgb  9.7 g/dL (14.0-18.0)  L  07/13/17  04:50    


 


Hct  29.1 % (42.0-52.0)  L  07/13/17  04:50    


 


MCV  97.4 fL (80.0-94.0)  H  07/13/17  04:50    


 


MCH  32.5 pg (27.0-31.0)  H  07/13/17  04:50    


 


MCHC  33.4 g/dL (32.0-36.0)   07/13/17  04:50    


 


RDW  14.2 % (12.0-15.0)   07/13/17  04:50    


 


Plt Count  192 10^3/uL (130-450)   07/13/17  04:50    


 


MPV  7.8 fL (7.4-11.4)   07/13/17  04:50    


 


Neut #  5.6 10^3/uL (1.5-6.6)   07/13/17  04:50    


 


Lymph #  1.1 10^3/uL (1.5-3.5)  L  07/13/17  04:50    


 


Mono #  1.0 10^3/uL (0.0-1.0)   07/13/17  04:50    


 


Eos #  0.2 10^3/uL (0.0-0.7)   07/13/17  04:50    


 


Baso #  0.0 10^3/uL (0.0-0.1)   07/13/17  04:50    


 


Absolute Nucleated RBC  0.00 x10^3/uL  07/13/17  04:50    


 


Nucleated RBCs  0.0 /100WBC  07/13/17  04:50    


 


D-Dimer  579.3 ng/mL (200.0-255.0)  H  07/12/17  16:48    


 


Bld Gas Analysis Time  0320   07/13/17  03:00    


 


Sample Site  LEFT RADIAL   07/13/17  03:00    


 


ABG pH  7.27  (7.35-7.45)  L  07/13/17  03:00    


 


ABG pCO2  60 mmHg (34-45)  H*  07/13/17  03:00    


 


ABG pO2  81 mmHg ()   07/13/17  03:00    


 


ABG HCO3  27.3 mmol/L (22.0-26.0)  H  07/13/17  03:00    


 


ABG Total CO2  29.0 MMOL/L (21.0-29.0)   07/13/17  03:00    


 


ABG O2 Saturation  94 % (94-98)   07/13/17  03:00    


 


ABG Oximetry Spot Check  95 %  07/13/17  03:00    


 


ABG Base Excess  0.0 mmol/L (-2.0-3.0)   07/13/17  03:00    


 


Real Test  POSITIVE   07/13/17  03:00    


 


VBG pH  7.291  (7.31-7.41)  L  07/13/17  04:50    


 


Ionized Calcium  1.18 mmol/L (1.15-1.33)   07/13/17  04:50    


 


Respiration Rate  18 b/min  07/13/17  03:00    


 


O2 Delivery Device  BiPAP   07/13/17  03:00    


 


O2 Liters/Min  3.00 LPM  07/12/17  11:50    


 


Vent Mode  SYNCHRONOUS/TIMES   07/13/17  03:00    


 


FiO2  35.00   07/13/17  03:00    


 


Pressure Support Vent  10 cmH2O  07/12/17  17:05    


 


EPAP  5 cmH2O  07/13/17  03:00    


 


IPAP  15 cmH2O  07/13/17  03:00    


 


Sodium  136 mmol/L (135-145)   07/13/17  04:50    


 


Potassium  5.2 mmol/L (3.5-5.0)  H  07/13/17  04:50    


 


Chloride  106 mmol/L (101-111)   07/13/17  04:50    


 


Carbon Dioxide  26 mmol/L (21-32)   07/13/17  04:50    


 


Anion Gap  4.0  (6-13)  L  07/13/17  04:50    


 


BUN  41 mg/dL (6-20)  H  07/13/17  04:50    


 


Creatinine  1.0 mg/dL (0.6-1.2)   07/13/17  04:50    


 


Estimated GFR (MDRD)  75  (>89)  L  07/13/17  04:50    


 


Glucose  123 mg/dL ()  H  07/13/17  04:50    


 


Lactic Acid  1.4 mmol/L (0.5-2.2)   07/11/17  20:38    


 


Calcium  8.0 mg/dL (8.5-10.3)  L  07/13/17  04:50    


 


Ionized Calcium  YES   07/13/17  04:50    


 


Phosphorus  2.8 mg/dL (2.5-4.6)   07/13/17  04:50    


 


Magnesium  2.0 mg/dL (1.7-2.8)   07/13/17  04:50    


 


Total Bilirubin  0.6 mg/dL (0.2-1.0)   07/13/17  04:50    


 


AST  22 IU/L (10-42)   07/13/17  04:50    


 


ALT  15 IU/L (10-60)   07/13/17  04:50    


 


Alkaline Phosphatase  47 IU/L ()   07/13/17  04:50    


 


Troponin I  < 0.04 ng/mL (<0.49)   07/12/17  12:20    


 


B-Natriuretic Peptide  87 pg/mL (5-100)   07/12/17  12:20    


 


Total Protein  5.9 g/dL (6.7-8.2)  L  07/13/17  04:50    


 


Albumin  2.8 g/dL (3.2-5.5)  L  07/13/17  04:50    


 


Globulin  3.1 g/dL (2.1-4.2)   07/13/17  04:50    


 


Albumin/Globulin Ratio  0.9  (1.0-2.2)  L  07/13/17  04:50    


 


Urine Color  YELLOW   07/11/17  22:15    


 


Urine Clarity  CLEAR  (CLEAR)   07/11/17  22:15    


 


Urine pH  5.5 PH (5.0-7.5)   07/11/17  22:15    


 


Ur Specific Gravity  1.025  (1.002-1.030)   07/11/17  22:15    


 


Urine Protein  NEGATIVE mg/dL (NEGATIVE)   07/11/17  22:15    


 


Urine Glucose (UA)  NEGATIVE mg/dL (NEGATIVE)   07/11/17  22:15    


 


Urine Ketones  TRACE mg/dL (NEGATIVE)   07/11/17  22:15    


 


Urine Occult Blood  NEGATIVE  (NEGATIVE)   07/11/17  22:15    


 


Urine Nitrite  NEGATIVE  (NEGATIVE)   07/11/17  22:15    


 


Urine Bilirubin  NEGATIVE  (NEGATIVE)   07/11/17  22:15    


 


Urine Urobilinogen  0.2 (NORMAL) E.U./dL (NORMAL)   07/11/17  22:15    


 


Ur Leukocyte Esterase  NEGATIVE  (NEGATIVE)   07/11/17  22:15    


 


Ur Microscopic Review  NOT INDICATED   07/11/17  22:15    


 


Urine Culture Comments  NOT INDICATED   07/11/17  22:15    


 


Urine Creatinine  144.9 mg/dL  07/12/17  13:35    


 


Urine Sodium  51.0 mmol/L  07/12/17  13:35

## 2017-07-13 NOTE — CT REPORT
CT PULMONARY ANGIOGRAM:  07/13/2017 

 

CLINICAL INDICATION:  Respiratory failure after surgery. 

 

Axial CT images of the chest were obtained with 80 mL Isovue-300 intravenously, and pulmonary arteria
l phase of enhancement.  Sagittal and coronal 3D reconstructions were performed. 

 

In accordance with CT protocol optimization, one or more of the following dose reduction techniques w
ere utilized for this exam:  automated exposure control, adjustment of mA and/or KV based on patient 
size, or use of iterative reconstructive technique. 

 

COMPARISON:  04/26/2017 

 

The heart is not enlarged.  There are segmental pulmonary emboli in the right upper and left lower lo
bes.  The lungs demonstrate minimal dependent atelectasis.  No focal infiltrate, effusion, or pneumot
horax is present.  No mediastinal lymphadenopathy is appreciated.  Osseous structures demonstrate deg
enerative changes and stable compression fracture of T7.  Limited evaluation of upper abdominal struc
tures demonstrates normal adrenal glands. 

 

IMPRESSION:  BILATERAL PULMONARY EMBOLI. 

 

CRITICAL RESULT:  Results called to Dr. Post on 07/13/2017 at 14:30 hours. 

 

 

 

DD:07/13/2017 14:32:56  DT: 07/13/2017 18:37  JOB #: [id number]  EXT JOB #:[id number]

## 2017-07-13 NOTE — XRAY REPORT
FRONTAL CHEST:  07/13/2017

 

CLINICAL INDICATION:  Respiratory failure, hypoxia.

 

FINDINGS:  Supine view of the chest is compared to previous film of 07/12/2017.  There is linear atel
ectasis at the left base.  No focal consolidation or edema is seen.  No effusion or pneumothorax. 

 

IMPRESSION:  LINEAR ATELECTASIS AT THE LEFT BASE. 

 

 

 

DD:07/13/2017 9:20:49  DT: 07/13/2017 09:32  JOB #: [id number]  EXT JOB #:[id number]

## 2017-07-14 LAB
ALBUMIN/GLOB SERPL: 0.8 {RATIO} (ref 1–2.2)
ANION GAP SERPL CALCULATED.4IONS-SCNC: 8 MMOL/L (ref 6–13)
BASOPHILS NFR BLD AUTO: 0 10^3/UL (ref 0–0.1)
BASOPHILS NFR BLD AUTO: 0.1 %
BILIRUB BLD-MCNC: 0.5 MG/DL (ref 0.2–1)
BUN SERPL-MCNC: 27 MG/DL (ref 6–20)
CALCIUM UR-MCNC: 8.5 MG/DL (ref 8.5–10.3)
CHLORIDE SERPL-SCNC: 103 MMOL/L (ref 101–111)
CO2 SERPL-SCNC: 27 MMOL/L (ref 21–32)
CREAT SERPLBLD-SCNC: 0.8 MG/DL (ref 0.6–1.2)
EOSINOPHIL # BLD AUTO: 0 10^3/UL (ref 0–0.7)
EOSINOPHIL NFR BLD AUTO: 0 %
ERYTHROCYTE [DISTWIDTH] IN BLOOD BY AUTOMATED COUNT: 14 % (ref 12–15)
GFRSERPLBLD MDRD-ARVRAT: 97 ML/MIN/{1.73_M2} (ref 89–?)
GLOBULIN SER-MCNC: 3.5 G/DL (ref 2.1–4.2)
GLUCOSE SERPL-MCNC: 197 MG/DL (ref 70–100)
HCT VFR BLD AUTO: 30.4 % (ref 42–52)
HGB UR QL STRIP: 9.9 G/DL (ref 14–18)
LYMPHOCYTES # SPEC AUTO: 0.6 10^3/UL (ref 1.5–3.5)
LYMPHOCYTES NFR BLD AUTO: 4.8 %
MAGNESIUM SERPL-MCNC: 2 MG/DL (ref 1.7–2.8)
MCH RBC QN AUTO: 31.6 PG (ref 27–31)
MCHC RBC AUTO-ENTMCNC: 32.7 G/DL (ref 32–36)
MCV RBC AUTO: 96.8 FL (ref 80–94)
MONOCYTES # BLD AUTO: 0.9 10^3/UL (ref 0–1)
MONOCYTES NFR BLD AUTO: 7.3 %
NEUTROPHILS # BLD AUTO: 11 10^3/UL (ref 1.5–6.6)
NEUTROPHILS # SNV AUTO: 12.5 X10^3/UL (ref 4.8–10.8)
NEUTROPHILS NFR BLD AUTO: 87.8 %
NRBC # BLD AUTO: 0.1 /100WBC
PDW BLD AUTO: 8 FL (ref 7.4–11.4)
PHOSPHATE BLD-MCNC: 2.4 MG/DL (ref 2.5–4.6)
POTASSIUM SERPL-SCNC: 5.2 MMOL/L (ref 3.5–5)
PROT SPEC-MCNC: 6.4 G/DL (ref 6.7–8.2)
RBC MAR: 3.14 10^6/UL (ref 4.7–6.1)
SODIUM SERPLBLD-SCNC: 138 MMOL/L (ref 135–145)
WBC # BLD: 12.5 X10^3/UL

## 2017-07-14 RX ADMIN — ACETAMINOPHEN SCH MG: 500 TABLET ORAL at 07:35

## 2017-07-14 RX ADMIN — OXYCODONE PRN MG: 5 TABLET ORAL at 04:26

## 2017-07-14 RX ADMIN — SODIUM PHOSPHATE, DIBASIC, ANHYDROUS, POTASSIUM PHOSPHATE, MONOBASIC, AND SODIUM PHOSPHATE, MONOBASIC, MONOHYDRATE SCH: 852; 155; 130 TABLET, COATED ORAL at 14:14

## 2017-07-14 RX ADMIN — SODIUM PHOSPHATE, DIBASIC, ANHYDROUS, POTASSIUM PHOSPHATE, MONOBASIC, AND SODIUM PHOSPHATE, MONOBASIC, MONOHYDRATE SCH MG: 852; 155; 130 TABLET, COATED ORAL at 16:29

## 2017-07-14 RX ADMIN — ACETAMINOPHEN SCH MG: 500 TABLET ORAL at 14:13

## 2017-07-14 RX ADMIN — CHLORHEXIDINE GLUCONATE SCH ML: 1.2 SOLUTION ORAL at 08:37

## 2017-07-14 RX ADMIN — SODIUM PHOSPHATE, DIBASIC, ANHYDROUS, POTASSIUM PHOSPHATE, MONOBASIC, AND SODIUM PHOSPHATE, MONOBASIC, MONOHYDRATE SCH MG: 852; 155; 130 TABLET, COATED ORAL at 08:37

## 2017-07-14 RX ADMIN — DOCUSATE SODIUM SCH: 100 CAPSULE, LIQUID FILLED ORAL at 20:00

## 2017-07-14 RX ADMIN — SODIUM CHLORIDE SCH MG: 9 INJECTION, SOLUTION INTRAVENOUS at 06:02

## 2017-07-14 RX ADMIN — METHYLPREDNISOLONE SODIUM SUCCINATE SCH MG: 40 INJECTION, POWDER, FOR SOLUTION INTRAMUSCULAR; INTRAVENOUS at 14:13

## 2017-07-14 RX ADMIN — METHYLPREDNISOLONE SODIUM SUCCINATE SCH MG: 40 INJECTION, POWDER, FOR SOLUTION INTRAMUSCULAR; INTRAVENOUS at 21:30

## 2017-07-14 RX ADMIN — IPRATROPIUM BROMIDE AND ALBUTEROL SULFATE SCH ML: 2.5; .5 SOLUTION RESPIRATORY (INHALATION) at 19:52

## 2017-07-14 RX ADMIN — SODIUM CHLORIDE, PRESERVATIVE FREE PRN ML: 5 INJECTION INTRAVENOUS at 18:48

## 2017-07-14 RX ADMIN — ENOXAPARIN SODIUM SCH MG: 150 INJECTION SUBCUTANEOUS at 08:37

## 2017-07-14 RX ADMIN — ACETAMINOPHEN SCH MG: 500 TABLET ORAL at 16:37

## 2017-07-14 RX ADMIN — METHYLPREDNISOLONE SODIUM SUCCINATE SCH MG: 40 INJECTION, POWDER, FOR SOLUTION INTRAMUSCULAR; INTRAVENOUS at 06:01

## 2017-07-14 RX ADMIN — OXYCODONE PRN MG: 5 TABLET ORAL at 09:03

## 2017-07-14 RX ADMIN — DOCUSATE SODIUM SCH: 100 CAPSULE, LIQUID FILLED ORAL at 08:39

## 2017-07-14 RX ADMIN — WARFARIN SODIUM SCH MG: 5 TABLET ORAL at 14:13

## 2017-07-14 RX ADMIN — SODIUM CHLORIDE, PRESERVATIVE FREE SCH ML: 5 INJECTION INTRAVENOUS at 10:58

## 2017-07-14 RX ADMIN — CHLORHEXIDINE GLUCONATE SCH: 1.2 SOLUTION ORAL at 20:00

## 2017-07-14 RX ADMIN — IPRATROPIUM BROMIDE AND ALBUTEROL SULFATE SCH ML: 2.5; .5 SOLUTION RESPIRATORY (INHALATION) at 07:20

## 2017-07-14 RX ADMIN — OXYCODONE PRN MG: 5 TABLET ORAL at 18:48

## 2017-07-14 RX ADMIN — OXYCODONE PRN MG: 5 TABLET ORAL at 15:11

## 2017-07-14 RX ADMIN — SODIUM CHLORIDE, PRESERVATIVE FREE PRN ML: 5 INJECTION INTRAVENOUS at 16:29

## 2017-07-14 RX ADMIN — DOCUSATE SODIUM SCH MG: 100 CAPSULE, LIQUID FILLED ORAL at 08:37

## 2017-07-14 RX ADMIN — SODIUM PHOSPHATE, DIBASIC, ANHYDROUS, POTASSIUM PHOSPHATE, MONOBASIC, AND SODIUM PHOSPHATE, MONOBASIC, MONOHYDRATE SCH MG: 852; 155; 130 TABLET, COATED ORAL at 08:00

## 2017-07-14 RX ADMIN — OXYCODONE PRN MG: 5 TABLET ORAL at 23:25

## 2017-07-14 RX ADMIN — SODIUM CHLORIDE, PRESERVATIVE FREE SCH ML: 5 INJECTION INTRAVENOUS at 21:30

## 2017-07-14 RX ADMIN — ENOXAPARIN SODIUM SCH MG: 150 INJECTION SUBCUTANEOUS at 21:30

## 2017-07-14 RX ADMIN — SODIUM CHLORIDE, PRESERVATIVE FREE PRN ML: 5 INJECTION INTRAVENOUS at 04:26

## 2017-07-14 RX ADMIN — IPRATROPIUM BROMIDE AND ALBUTEROL SULFATE SCH ML: 2.5; .5 SOLUTION RESPIRATORY (INHALATION) at 15:10

## 2017-07-14 NOTE — PROVIDER PROGRESS NOTE
Assessment/Plan





- Problem List


(1) Acute respiratory failure with hypoxia and hypercapnia


Assessment/Plan: 


Secondary to pulmonary embolism, COPD and decreased respiratory drive from 

opioids 


Patient was placed on BiPAP overnight although PCO2 was not improved patient 

clinically much better with improved mentation 


CTA of lungs revealed bilateral PEs


Patient was wheezy therefore placed on IV steroids


On lovenox for PE


BiPAP discontinued 


Patient likely has MAURICIO needs outpatient sleep study


Resolved








(2) Pulmonary Embolism


Impression: 


Patient had acute respiratory failure on 07/12/17 found to have hypercapnia and 

hypoxia 


CXR negative


D dimer elevated and patient had tachycardia with recent surgery


CTA of the thorax revealed bilateral PEs


Patient started on lovenox for bridging and coumadin daily


Daily INR





(3) MILAN (acute kidney injury)


Impression: 


Resolved


Crt down to 0.8


Avoid nephrotoxic agents


Continue to monitor 








(4) Hyperkalemia


Impression: 


Patient has chronic hyperkalemia likely secondary to lisinopril


Lisinopril stopped


Renal failure improved


K down to 5.2








(5) Acute encephalopathy


Impression: 


Secondary to hypercapnia and sedation from pain meds


Resolved








(6) Acute blood loss as cause of postoperative anemia


Impression: 


Secondary to surgery 


Patient with blood loss anemia


Hb stable today at 9.9


On lovenox and coumadin for PE


Monitor closely 








(7) Status post total hip replacement, right


Impression: 


POD#3


Pain controlled


PT recs SNF


Ortho on board 


Social work to work on SNF placement for weekend








(8) Moderate COPD (chronic obstructive pulmonary disease)


Impression: 


Wheezing 


Did have hypercapnia and hypoxia


Continue Duonebs and IV steroids switch to PO steroids at discharge


Supplemental O2  








(9) HTN (hypertension)


Impression: 


BP well controlled 


Hold lisinopril secondary to MILAN and hyperkalemia 


Qualifiers: 


   Hypertension type: essential hypertension   Qualified Code(s): I10 - 

Essential (primary) hypertension   





(10) Prophylactic use of low molecular weight heparin for venous thromboembolism


Impression: 


Lovenox for prophylaxis 











- Current Meds


Current Meds: 





 Current Medications











Generic Name Dose Route Start Last Admin





  Trade Name Freq  PRN Reason Stop Dose Admin


 


Acetaminophen  1,000 mg 07/11/17 12:00 07/14/17 16:37





  Tylenol  PO   1,000 mg





  TIDWM SAM   Administration


 


Albuterol/Ipratropium  3 ml 07/10/17 19:00 07/14/17 15:10





  Duoneb  INH   3 ml





  RTQID SAM   Administration


 


Chlorhexidine Gluconate  15 ml 07/12/17 21:00 07/14/17 08:37





  Peridex  PO   15 ml





  BID SAM   Administration


 


Diphenhydramine HCl  25 mg 07/10/17 17:00 07/13/17 23:39





  Benadryl  PO   25 mg





  Q6H PRN   Administration





  ITCHING   


 


Docusate Sodium  100 mg 07/11/17 10:00 07/14/17 08:39





  Colace 100mg Capsule  PO   Not Given





  BID SAM   


 


Enoxaparin Sodium  125 mg 07/13/17 15:00 07/14/17 08:37





  Lovenox  SUBQ   125 mg





  BID SAM   Administration


 


Hydromorphone HCl  0.5 mg 07/13/17 16:51 07/14/17 16:28





  Dilaudid Inj  IVP   0.5 mg





  Q2HR PRN   Administration





  PAIN   


 


Lorazepam  0.5 mg 07/12/17 15:21 07/13/17 21:16





  Ativan Inj  IVP   0.5 mg





  Q2HR PRN   Administration





  Anxiety   


 


Methylprednisolone  40 mg 07/13/17 11:00 07/14/17 14:13





  Solu-Medrol (40mg Vial)  IVP   40 mg





  TID SAM   Administration


 


Non-Formulary Medication  1 puffs 07/11/17 10:00 07/14/17 07:20





  Fluticasone/Vilanterol [Breo Ellipta 100-25 Mcg Inh]  INH   1 puffs





  RTDAILY SAM   Administration


 


Oxycodone HCl  5 - 10 mg 07/11/17 13:30 07/14/17 15:11





  Roxicodone  PO   10 mg





  Q4HR PRN   Administration





  PAIN   


 


Pantoprazole Sodium  40 mg 07/13/17 07:00 07/14/17 06:02





  Protonix  IVP   40 mg





  QDAC SAM   Administration


 


Sodium Chloride  10 ml 07/10/17 17:00 07/14/17 16:29





  Normal Saline Flush 0.9%  IVP   10 ml





  PRN PRN   Administration





  AS NEEDED PER PROVIDER ORDERS   


 


Sodium Chloride  10 ml 07/10/17 22:00 07/14/17 10:58





  Normal Saline Flush 0.9%  IVP   10 ml





  Q8HR SAM   Administration


 


Sodium Phosphate  250 mg 07/14/17 08:00 07/14/17 16:29





  K-Phos Neutral  PO   250 mg





  TIDWM SAM   Administration


 


Warfarin Sodium  5 mg 07/13/17 15:00 07/14/17 14:13





  Coumadin  PO   5 mg





  QDWARFARIN SAM   Administration














- Lab Result


Lab results reviewed: Yes


Fish Bone Diagrams: 


 07/14/17 05:35





 07/14/17 05:35


Other Lab Results: 








 Laboratory Results











WBC  12.5 x10^3/uL (4.8-10.8)  H  07/14/17  05:35    


 


RBC  3.14 10^6/uL (4.70-6.10)  L  07/14/17  05:35    


 


Hgb  9.9 g/dL (14.0-18.0)  L  07/14/17  05:35    


 


Hct  30.4 % (42.0-52.0)  L  07/14/17  05:35    


 


MCV  96.8 fL (80.0-94.0)  H  07/14/17  05:35    


 


MCH  31.6 pg (27.0-31.0)  H  07/14/17  05:35    


 


MCHC  32.7 g/dL (32.0-36.0)   07/14/17  05:35    


 


RDW  14.0 % (12.0-15.0)   07/14/17  05:35    


 


Plt Count  241 10^3/uL (130-450)   07/14/17  05:35    


 


MPV  8.0 fL (7.4-11.4)   07/14/17  05:35    


 


Neut #  11.0 10^3/uL (1.5-6.6)  H  07/14/17  05:35    


 


Lymph #  0.6 10^3/uL (1.5-3.5)  L  07/14/17  05:35    


 


Mono #  0.9 10^3/uL (0.0-1.0)   07/14/17  05:35    


 


Eos #  0.0 10^3/uL (0.0-0.7)   07/14/17  05:35    


 


Baso #  0.0 10^3/uL (0.0-0.1)   07/14/17  05:35    


 


Absolute Nucleated RBC  0.02 x10^3/uL  07/14/17  05:35    


 


Nucleated RBCs  0.1 /100WBC  07/14/17  05:35    


 


D-Dimer  579.3 ng/mL (200.0-255.0)  H  07/12/17  16:48    


 


Bld Gas Analysis Time  0320   07/13/17  03:00    


 


Sample Site  LEFT RADIAL   07/13/17  03:00    


 


ABG pH  7.27  (7.35-7.45)  L  07/13/17  03:00    


 


ABG pCO2  60 mmHg (34-45)  H*  07/13/17  03:00    


 


ABG pO2  81 mmHg ()   07/13/17  03:00    


 


ABG HCO3  27.3 mmol/L (22.0-26.0)  H  07/13/17  03:00    


 


ABG Total CO2  29.0 MMOL/L (21.0-29.0)   07/13/17  03:00    


 


ABG O2 Saturation  94 % (94-98)   07/13/17  03:00    


 


ABG Oximetry Spot Check  95 %  07/13/17  03:00    


 


ABG Base Excess  0.0 mmol/L (-2.0-3.0)   07/13/17  03:00    


 


Real Test  POSITIVE   07/13/17  03:00    


 


VBG pH  7.291  (7.31-7.41)  L  07/13/17  04:50    


 


Ionized Calcium  1.18 mmol/L (1.15-1.33)   07/13/17  04:50    


 


Respiration Rate  18 b/min  07/13/17  03:00    


 


O2 Delivery Device  BiPAP   07/13/17  03:00    


 


O2 Liters/Min  3.00 LPM  07/12/17  11:50    


 


Vent Mode  SYNCHRONOUS/TIMES   07/13/17  03:00    


 


FiO2  35.00   07/13/17  03:00    


 


Pressure Support Vent  10 cmH2O  07/12/17  17:05    


 


EPAP  5 cmH2O  07/13/17  03:00    


 


IPAP  15 cmH2O  07/13/17  03:00    


 


Sodium  138 mmol/L (135-145)   07/14/17  05:35    


 


Potassium  5.2 mmol/L (3.5-5.0)  H  07/14/17  05:35    


 


Chloride  103 mmol/L (101-111)   07/14/17  05:35    


 


Carbon Dioxide  27 mmol/L (21-32)   07/14/17  05:35    


 


Anion Gap  8.0  (6-13)   07/14/17  05:35    


 


BUN  27 mg/dL (6-20)  H  07/14/17  05:35    


 


Creatinine  0.8 mg/dL (0.6-1.2)   07/14/17  05:35    


 


Estimated GFR (MDRD)  97  (>89)   07/14/17  05:35    


 


Glucose  197 mg/dL ()  H  07/14/17  05:35    


 


Lactic Acid  1.4 mmol/L (0.5-2.2)   07/11/17  20:38    


 


Calcium  8.5 mg/dL (8.5-10.3)   07/14/17  05:35    


 


Ionized Calcium  NO   07/14/17  05:35    


 


Phosphorus  2.4 mg/dL (2.5-4.6)  L  07/14/17  05:35    


 


Magnesium  2.0 mg/dL (1.7-2.8)   07/14/17  05:35    


 


Total Bilirubin  0.5 mg/dL (0.2-1.0)   07/14/17  05:35    


 


AST  20 IU/L (10-42)   07/14/17  05:35    


 


ALT  17 IU/L (10-60)   07/14/17  05:35    


 


Alkaline Phosphatase  58 IU/L ()   07/14/17  05:35    


 


Troponin I  < 0.04 ng/mL (<0.49)   07/12/17  12:20    


 


B-Natriuretic Peptide  87 pg/mL (5-100)   07/12/17  12:20    


 


Total Protein  6.4 g/dL (6.7-8.2)  L  07/14/17  05:35    


 


Albumin  2.9 g/dL (3.2-5.5)  L  07/14/17  05:35    


 


Globulin  3.5 g/dL (2.1-4.2)   07/14/17  05:35    


 


Albumin/Globulin Ratio  0.8  (1.0-2.2)  L  07/14/17  05:35    


 


Urine Color  YELLOW   07/11/17  22:15    


 


Urine Clarity  CLEAR  (CLEAR)   07/11/17  22:15    


 


Urine pH  5.5 PH (5.0-7.5)   07/11/17  22:15    


 


Ur Specific Gravity  1.025  (1.002-1.030)   07/11/17  22:15    


 


Urine Protein  NEGATIVE mg/dL (NEGATIVE)   07/11/17  22:15    


 


Urine Glucose (UA)  NEGATIVE mg/dL (NEGATIVE)   07/11/17  22:15    


 


Urine Ketones  TRACE mg/dL (NEGATIVE)   07/11/17  22:15    


 


Urine Occult Blood  NEGATIVE  (NEGATIVE)   07/11/17  22:15    


 


Urine Nitrite  NEGATIVE  (NEGATIVE)   07/11/17  22:15    


 


Urine Bilirubin  NEGATIVE  (NEGATIVE)   07/11/17  22:15    


 


Urine Urobilinogen  0.2 (NORMAL) E.U./dL (NORMAL)   07/11/17  22:15    


 


Ur Leukocyte Esterase  NEGATIVE  (NEGATIVE)   07/11/17  22:15    


 


Ur Microscopic Review  NOT INDICATED   07/11/17  22:15    


 


Urine Culture Comments  NOT INDICATED   07/11/17  22:15    


 


Urine Creatinine  144.9 mg/dL  07/12/17  13:35    


 


Urine Sodium  51.0 mmol/L  07/12/17  13:35    














- Diagnostic Imaging Results


Diagnostic Imaging Results: Final report reviewed


Diagnostic Imaging Results Comments: 





CT angiogram of the thorax


Impression:


Bilateral pulmonary emboli





- Additional Planning


Condition/Complexity: Improved


My Orders: 





My Active Orders





07/13/17 16:51


HYDROmorphone INJ [Dilaudid Inj]   0.5 mg IVP Q2HR PRN 





07/14/17


Evaluate and Treat OT [OT] Routine 


Evaluate and Treat PT [PT] Routine 





07/14/17 08:00


Neutra-Phos [K-Phos Neutral]   250 mg PO TIDWM 





07/15/17 05:00


CBC - COMP BLD CT W/AUTO DIFF [HEME] DAILYLAB 


CMP, RFLX TO IONIZED CA IF [CHEM] DAILYLAB 


MAGNESIUM [CHEM] DAILYLAB 


PHOSPHORUS [CHEM] DAILYLAB 


PT WITH INR [COAG] DAILYLAB 





07/16/17 05:00


CBC - COMP BLD CT W/AUTO DIFF [HEME] DAILYLAB 


CMP, RFLX TO IONIZED CA IF [CHEM] DAILYLAB 


MAGNESIUM [CHEM] DAILYLAB 


PHOSPHORUS [CHEM] DAILYLAB 


PT WITH INR [COAG] DAILYLAB 





07/17/17 05:00


CBC - COMP BLD CT W/AUTO DIFF [HEME] DAILYLAB 


CMP, RFLX TO IONIZED CA IF [CHEM] DAILYLAB 


MAGNESIUM [CHEM] DAILYLAB 


PHOSPHORUS [CHEM] DAILYLAB 


PT WITH INR [COAG] DAILYLAB 





07/18/17 05:00


PT WITH INR [COAG] DAILYLAB 











Consult/Specialty: Other (orthopedics)


Plan Discussed with:: Patient


Time Spent: 31-60 minutes





Subjective





- Subjective


Patient Reports: Feeling Better (Patient states that breathing is much 

improved. Patient states that he was able to stand with minimal assistance 

today. Patient is eating well. He states pain is controlled.)


Nursing Reports: No Complaints





Objective


Vital Signs: 





 Vital Signs - 24 hr











  07/13/17 07/14/17 07/14/17





  19:30 00:04 03:02


 


Temperature  37.0 C 


 


Heart Rate 105 H  


 


Heart Rate [   





Brachial]   


 


Heart Rate [  104 H 95





Monitoring   





electrodes]   


 


Respiratory 18 18 





Rate   


 


Blood Pressure  130/70 149/85 H





[Left Brachial   





artery]   


 


O2 Saturation  92 


 


O2 Saturation [   





With Activity]   


 


O2 Saturation [   





Without   





Activity]   














  07/14/17 07/14/17 07/14/17





  07:20 07:36 10:10


 


Temperature  36.8 C 


 


Heart Rate 107 H  


 


Heart Rate [  110 H 





Brachial]   


 


Heart Rate [   





Monitoring   





electrodes]   


 


Respiratory 20 20 





Rate   


 


Blood Pressure  153/92 H 





[Left Brachial   





artery]   


 


O2 Saturation  93 


 


O2 Saturation [   88 L





With Activity]   


 


O2 Saturation [   86 L





Without   





Activity]   














  07/14/17 07/14/17





  15:10 16:03


 


Temperature  36.9 C


 


Heart Rate 98 


 


Heart Rate [  95





Brachial]  


 


Heart Rate [  





Monitoring  





electrodes]  


 


Respiratory 20 20





Rate  


 


Blood Pressure  146/71 H





[Left Brachial  





artery]  


 


O2 Saturation  93


 


O2 Saturation [  





With Activity]  


 


O2 Saturation [  





Without  





Activity]  








 Oxygen











O2 Source [With Activity]      to 93% on 3L O2 w/PLB


 


O2 Source [Without Activity]   to 93% on 3L O2 w/ PLB


 


O2 Source                      Nasal cannula














I&O (Last 24 Hrs): 





 Intake and Output Totals x24h











 07/12/17 07/13/17 07/14/17





 23:59 23:59 23:59


 


Intake Total 2030 2935 860


 


Output Total 2375 2095 1150


 


Balance -345 840 -290











General: Alert, Oriented x3, Cooperative, No acute distress


HEENT: Atraumatic, PERRLA, EOMI, Mucous membr. moist/pink


Neck: Supple, No JVD, No thyromegaly, +2 carotid pulse wo bruit, No LAD


Lymphatic: no adenopathy


Neuro: Alert, Non Focal, CN 2-12 Grossly Intact, Oriented Times 3


Cardiovascular: Regular rate, Normal S1, Normal S2, No murmurs


Respiratory: Chest non-tender, No respiratory distress, Wheezes (mild wheezes 

improved)


Abdomen: Normal bowel sounds, Soft, No tenderness, No hepatospenomegaly, Other (

distended)


Extremities: No clubbing, No cyanosis, Normal pulses, Other (bilateral lower 

extremity edema,  right hip  appear swollen surgical site appears clean)


Skin: No rashes, No breakdown


Comments/Notes: 





clean surgical site





- Results


Results: 





 Laboratory Results











WBC  12.5 x10^3/uL (4.8-10.8)  H  07/14/17  05:35    


 


RBC  3.14 10^6/uL (4.70-6.10)  L  07/14/17  05:35    


 


Hgb  9.9 g/dL (14.0-18.0)  L  07/14/17  05:35    


 


Hct  30.4 % (42.0-52.0)  L  07/14/17  05:35    


 


MCV  96.8 fL (80.0-94.0)  H  07/14/17  05:35    


 


MCH  31.6 pg (27.0-31.0)  H  07/14/17  05:35    


 


MCHC  32.7 g/dL (32.0-36.0)   07/14/17  05:35    


 


RDW  14.0 % (12.0-15.0)   07/14/17  05:35    


 


Plt Count  241 10^3/uL (130-450)   07/14/17  05:35    


 


MPV  8.0 fL (7.4-11.4)   07/14/17  05:35    


 


Neut #  11.0 10^3/uL (1.5-6.6)  H  07/14/17  05:35    


 


Lymph #  0.6 10^3/uL (1.5-3.5)  L  07/14/17  05:35    


 


Mono #  0.9 10^3/uL (0.0-1.0)   07/14/17  05:35    


 


Eos #  0.0 10^3/uL (0.0-0.7)   07/14/17  05:35    


 


Baso #  0.0 10^3/uL (0.0-0.1)   07/14/17  05:35    


 


Absolute Nucleated RBC  0.02 x10^3/uL  07/14/17  05:35    


 


Nucleated RBCs  0.1 /100WBC  07/14/17  05:35    


 


D-Dimer  579.3 ng/mL (200.0-255.0)  H  07/12/17  16:48    


 


Bld Gas Analysis Time  0320   07/13/17  03:00    


 


Sample Site  LEFT RADIAL   07/13/17  03:00    


 


ABG pH  7.27  (7.35-7.45)  L  07/13/17  03:00    


 


ABG pCO2  60 mmHg (34-45)  H*  07/13/17  03:00    


 


ABG pO2  81 mmHg ()   07/13/17  03:00    


 


ABG HCO3  27.3 mmol/L (22.0-26.0)  H  07/13/17  03:00    


 


ABG Total CO2  29.0 MMOL/L (21.0-29.0)   07/13/17  03:00    


 


ABG O2 Saturation  94 % (94-98)   07/13/17  03:00    


 


ABG Oximetry Spot Check  95 %  07/13/17  03:00    


 


ABG Base Excess  0.0 mmol/L (-2.0-3.0)   07/13/17  03:00    


 


Real Test  POSITIVE   07/13/17  03:00    


 


VBG pH  7.291  (7.31-7.41)  L  07/13/17  04:50    


 


Ionized Calcium  1.18 mmol/L (1.15-1.33)   07/13/17  04:50    


 


Respiration Rate  18 b/min  07/13/17  03:00    


 


O2 Delivery Device  BiPAP   07/13/17  03:00    


 


O2 Liters/Min  3.00 LPM  07/12/17  11:50    


 


Vent Mode  SYNCHRONOUS/TIMES   07/13/17  03:00    


 


FiO2  35.00   07/13/17  03:00    


 


Pressure Support Vent  10 cmH2O  07/12/17  17:05    


 


EPAP  5 cmH2O  07/13/17  03:00    


 


IPAP  15 cmH2O  07/13/17  03:00    


 


Sodium  138 mmol/L (135-145)   07/14/17  05:35    


 


Potassium  5.2 mmol/L (3.5-5.0)  H  07/14/17  05:35    


 


Chloride  103 mmol/L (101-111)   07/14/17  05:35    


 


Carbon Dioxide  27 mmol/L (21-32)   07/14/17  05:35    


 


Anion Gap  8.0  (6-13)   07/14/17  05:35    


 


BUN  27 mg/dL (6-20)  H  07/14/17  05:35    


 


Creatinine  0.8 mg/dL (0.6-1.2)   07/14/17  05:35    


 


Estimated GFR (MDRD)  97  (>89)   07/14/17  05:35    


 


Glucose  197 mg/dL ()  H  07/14/17  05:35    


 


Lactic Acid  1.4 mmol/L (0.5-2.2)   07/11/17  20:38    


 


Calcium  8.5 mg/dL (8.5-10.3)   07/14/17  05:35    


 


Ionized Calcium  NO   07/14/17  05:35    


 


Phosphorus  2.4 mg/dL (2.5-4.6)  L  07/14/17  05:35    


 


Magnesium  2.0 mg/dL (1.7-2.8)   07/14/17  05:35    


 


Total Bilirubin  0.5 mg/dL (0.2-1.0)   07/14/17  05:35    


 


AST  20 IU/L (10-42)   07/14/17  05:35    


 


ALT  17 IU/L (10-60)   07/14/17  05:35    


 


Alkaline Phosphatase  58 IU/L ()   07/14/17  05:35    


 


Troponin I  < 0.04 ng/mL (<0.49)   07/12/17  12:20    


 


B-Natriuretic Peptide  87 pg/mL (5-100)   07/12/17  12:20    


 


Total Protein  6.4 g/dL (6.7-8.2)  L  07/14/17  05:35    


 


Albumin  2.9 g/dL (3.2-5.5)  L  07/14/17  05:35    


 


Globulin  3.5 g/dL (2.1-4.2)   07/14/17  05:35    


 


Albumin/Globulin Ratio  0.8  (1.0-2.2)  L  07/14/17  05:35    


 


Urine Color  YELLOW   07/11/17  22:15    


 


Urine Clarity  CLEAR  (CLEAR)   07/11/17  22:15    


 


Urine pH  5.5 PH (5.0-7.5)   07/11/17  22:15    


 


Ur Specific Gravity  1.025  (1.002-1.030)   07/11/17  22:15    


 


Urine Protein  NEGATIVE mg/dL (NEGATIVE)   07/11/17  22:15    


 


Urine Glucose (UA)  NEGATIVE mg/dL (NEGATIVE)   07/11/17  22:15    


 


Urine Ketones  TRACE mg/dL (NEGATIVE)   07/11/17  22:15    


 


Urine Occult Blood  NEGATIVE  (NEGATIVE)   07/11/17  22:15    


 


Urine Nitrite  NEGATIVE  (NEGATIVE)   07/11/17  22:15    


 


Urine Bilirubin  NEGATIVE  (NEGATIVE)   07/11/17  22:15    


 


Urine Urobilinogen  0.2 (NORMAL) E.U./dL (NORMAL)   07/11/17  22:15    


 


Ur Leukocyte Esterase  NEGATIVE  (NEGATIVE)   07/11/17  22:15    


 


Ur Microscopic Review  NOT INDICATED   07/11/17  22:15    


 


Urine Culture Comments  NOT INDICATED   07/11/17  22:15    


 


Urine Creatinine  144.9 mg/dL  07/12/17  13:35    


 


Urine Sodium  51.0 mmol/L  07/12/17  13:35    














- Procedures


Procedures: 





Right Total Hip Arthroplasty via Anterolateral Approach

## 2017-07-15 LAB
ALBUMIN/GLOB SERPL: 0.7 {RATIO} (ref 1–2.2)
ANION GAP SERPL CALCULATED.4IONS-SCNC: 9 MMOL/L (ref 6–13)
BASOPHILS NFR BLD AUTO: 0 10^3/UL (ref 0–0.1)
BASOPHILS NFR BLD AUTO: 0.3 %
BILIRUB BLD-MCNC: 0.6 MG/DL (ref 0.2–1)
BUN SERPL-MCNC: 32 MG/DL (ref 6–20)
CALCIUM UR-MCNC: 8.6 MG/DL (ref 8.5–10.3)
CHLORIDE SERPL-SCNC: 104 MMOL/L (ref 101–111)
CO2 SERPL-SCNC: 27 MMOL/L (ref 21–32)
CREAT SERPLBLD-SCNC: 0.8 MG/DL (ref 0.6–1.2)
EOSINOPHIL # BLD AUTO: 0 10^3/UL (ref 0–0.7)
EOSINOPHIL NFR BLD AUTO: 0 %
ERYTHROCYTE [DISTWIDTH] IN BLOOD BY AUTOMATED COUNT: 14.1 % (ref 12–15)
GFRSERPLBLD MDRD-ARVRAT: 97 ML/MIN/{1.73_M2} (ref 89–?)
GLOBULIN SER-MCNC: 4.1 G/DL (ref 2.1–4.2)
GLUCOSE SERPL-MCNC: 148 MG/DL (ref 70–100)
HCT VFR BLD AUTO: 30 % (ref 42–52)
HGB UR QL STRIP: 9.9 G/DL (ref 14–18)
INR PPP: 1.1 (ref 0.8–1.2)
LYMPHOCYTES # SPEC AUTO: 0.6 10^3/UL (ref 1.5–3.5)
LYMPHOCYTES NFR BLD AUTO: 4.1 %
MAGNESIUM SERPL-MCNC: 2.1 MG/DL (ref 1.7–2.8)
MCH RBC QN AUTO: 31.7 PG (ref 27–31)
MCHC RBC AUTO-ENTMCNC: 32.9 G/DL (ref 32–36)
MCV RBC AUTO: 96.4 FL (ref 80–94)
MONOCYTES # BLD AUTO: 1.5 10^3/UL (ref 0–1)
MONOCYTES NFR BLD AUTO: 9.7 %
NEUTROPHILS # BLD AUTO: 13 10^3/UL (ref 1.5–6.6)
NEUTROPHILS # SNV AUTO: 15.1 X10^3/UL (ref 4.8–10.8)
NEUTROPHILS NFR BLD AUTO: 85.9 %
NRBC # BLD AUTO: 0.5 /100WBC
PDW BLD AUTO: 8.6 FL (ref 7.4–11.4)
PHOSPHATE BLD-MCNC: 2.5 MG/DL (ref 2.5–4.6)
POTASSIUM SERPL-SCNC: 4.8 MMOL/L (ref 3.5–5)
PROT SPEC-MCNC: 6.9 G/DL (ref 6.7–8.2)
PROTHROM ACT/NOR PPP: 12.9 SECS (ref 9.9–12.6)
RBC MAR: 3.11 10^6/UL (ref 4.7–6.1)
SODIUM SERPLBLD-SCNC: 140 MMOL/L (ref 135–145)
WBC # BLD: 15.1 X10^3/UL

## 2017-07-15 RX ADMIN — ENOXAPARIN SODIUM SCH MG: 150 INJECTION SUBCUTANEOUS at 20:27

## 2017-07-15 RX ADMIN — DOCUSATE SODIUM SCH: 100 CAPSULE, LIQUID FILLED ORAL at 20:09

## 2017-07-15 RX ADMIN — OXYCODONE PRN MG: 5 TABLET ORAL at 05:08

## 2017-07-15 RX ADMIN — IPRATROPIUM BROMIDE AND ALBUTEROL SULFATE SCH ML: 2.5; .5 SOLUTION RESPIRATORY (INHALATION) at 16:49

## 2017-07-15 RX ADMIN — METHYLPREDNISOLONE SODIUM SUCCINATE SCH MG: 40 INJECTION, POWDER, FOR SOLUTION INTRAMUSCULAR; INTRAVENOUS at 05:08

## 2017-07-15 RX ADMIN — OXYCODONE PRN MG: 5 TABLET ORAL at 17:21

## 2017-07-15 RX ADMIN — CHLORHEXIDINE GLUCONATE SCH: 1.2 SOLUTION ORAL at 20:09

## 2017-07-15 RX ADMIN — OXYCODONE PRN MG: 5 TABLET ORAL at 13:34

## 2017-07-15 RX ADMIN — OXYCODONE PRN MG: 5 TABLET ORAL at 22:08

## 2017-07-15 RX ADMIN — NIFEDIPINE SCH MG: 30 TABLET, FILM COATED, EXTENDED RELEASE ORAL at 09:19

## 2017-07-15 RX ADMIN — ENOXAPARIN SODIUM SCH MG: 150 INJECTION SUBCUTANEOUS at 09:19

## 2017-07-15 RX ADMIN — IPRATROPIUM BROMIDE AND ALBUTEROL SULFATE SCH ML: 2.5; .5 SOLUTION RESPIRATORY (INHALATION) at 09:24

## 2017-07-15 RX ADMIN — CHLORHEXIDINE GLUCONATE SCH: 1.2 SOLUTION ORAL at 09:24

## 2017-07-15 RX ADMIN — SODIUM CHLORIDE SCH: 9 INJECTION, SOLUTION INTRAVENOUS at 06:23

## 2017-07-15 RX ADMIN — SODIUM PHOSPHATE, DIBASIC, ANHYDROUS, POTASSIUM PHOSPHATE, MONOBASIC, AND SODIUM PHOSPHATE, MONOBASIC, MONOHYDRATE SCH: 852; 155; 130 TABLET, COATED ORAL at 17:22

## 2017-07-15 RX ADMIN — IPRATROPIUM BROMIDE AND ALBUTEROL SULFATE SCH ML: 2.5; .5 SOLUTION RESPIRATORY (INHALATION) at 20:45

## 2017-07-15 RX ADMIN — ACETAMINOPHEN SCH MG: 500 TABLET ORAL at 09:19

## 2017-07-15 RX ADMIN — SODIUM CHLORIDE, PRESERVATIVE FREE SCH ML: 5 INJECTION INTRAVENOUS at 22:26

## 2017-07-15 RX ADMIN — SODIUM CHLORIDE, PRESERVATIVE FREE SCH ML: 5 INJECTION INTRAVENOUS at 05:08

## 2017-07-15 RX ADMIN — METHYLPREDNISOLONE SODIUM SUCCINATE SCH MG: 40 INJECTION, POWDER, FOR SOLUTION INTRAMUSCULAR; INTRAVENOUS at 13:34

## 2017-07-15 RX ADMIN — DOCUSATE SODIUM SCH: 100 CAPSULE, LIQUID FILLED ORAL at 09:24

## 2017-07-15 RX ADMIN — IPRATROPIUM BROMIDE AND ALBUTEROL SULFATE SCH ML: 2.5; .5 SOLUTION RESPIRATORY (INHALATION) at 13:26

## 2017-07-15 RX ADMIN — OXYCODONE PRN MG: 5 TABLET ORAL at 09:24

## 2017-07-15 RX ADMIN — SODIUM PHOSPHATE, DIBASIC, ANHYDROUS, POTASSIUM PHOSPHATE, MONOBASIC, AND SODIUM PHOSPHATE, MONOBASIC, MONOHYDRATE SCH MG: 852; 155; 130 TABLET, COATED ORAL at 09:19

## 2017-07-15 RX ADMIN — SODIUM CHLORIDE, PRESERVATIVE FREE SCH ML: 5 INJECTION INTRAVENOUS at 13:35

## 2017-07-15 RX ADMIN — SODIUM CHLORIDE, PRESERVATIVE FREE PRN ML: 5 INJECTION INTRAVENOUS at 17:57

## 2017-07-15 RX ADMIN — ACETAMINOPHEN SCH MG: 500 TABLET ORAL at 13:34

## 2017-07-15 RX ADMIN — IPRATROPIUM BROMIDE AND ALBUTEROL SULFATE SCH: 2.5; .5 SOLUTION RESPIRATORY (INHALATION) at 09:24

## 2017-07-15 RX ADMIN — SODIUM CHLORIDE, PRESERVATIVE FREE PRN ML: 5 INJECTION INTRAVENOUS at 23:38

## 2017-07-15 RX ADMIN — ACETAMINOPHEN SCH MG: 500 TABLET ORAL at 17:21

## 2017-07-15 RX ADMIN — WARFARIN SODIUM SCH MG: 5 TABLET ORAL at 13:35

## 2017-07-15 RX ADMIN — SODIUM PHOSPHATE, DIBASIC, ANHYDROUS, POTASSIUM PHOSPHATE, MONOBASIC, AND SODIUM PHOSPHATE, MONOBASIC, MONOHYDRATE SCH: 852; 155; 130 TABLET, COATED ORAL at 13:23

## 2017-07-15 NOTE — PROVIDER PROGRESS NOTE
Assessment/Plan





- Problem List


(1) Acute respiratory failure with hypoxia and hypercapnia


Assessment/Plan: 


Secondary to pulmonary embolism, COPD and decreased respiratory drive from 

opioids 


Patient was placed on BiPAP overnight although PCO2 was not improved patient 

clinically much better with improved mentation 


CTA of lungs revealed bilateral PEs


Patient was wheezy therefore placed on IV steroids will change to PO


On lovenox for PE


BiPAP discontinued 


Patient likely has MAURICIO needs outpatient sleep study


Resolved








(2) Pulmonary Embolism


Impression: 


Patient had acute respiratory failure on 07/12/17 found to have hypercapnia and 

hypoxia 


CXR negative


D dimer elevated and patient had tachycardia with recent surgery


CTA of the thorax revealed bilateral PEs


Patient started on lovenox for bridging and coumadin daily


INR is 1.1 this am


Will continue to monitor INR daily 





(3) MILAN (acute kidney injury)


Impression: 


Resolved


Crt down to 0.8


Avoid nephrotoxic agents








(4) Hyperkalemia


Impression: 


Patient has chronic hyperkalemia likely secondary to lisinopril


Lisinopril stopped


Renal failure improved


K down to 4.8


Resolved








(5) Acute encephalopathy


Impression: 


Secondary to hypercapnia and sedation from pain meds


Resolved








(6) Acute blood loss as cause of postoperative anemia


Impression: 


Secondary to surgery 


Patient with blood loss anemia


Hb stable today at 9.9


On lovenox and coumadin for PE


Monitor daily








(7) Status post total hip replacement, right


Impression: 


POD#4


Pain controlled


PT recs SNF


Ortho on board changed dressing today 


Patient did not get autherized for SNF today therefore will have to wait till 

Monday for discharge to [facility] in [place] 








(8) Moderate COPD (chronic obstructive pulmonary disease)


Impression: 


Acute exacerbation with PEs


Did have hypercapnia and hypoxia


Continue Duonebs and will switch steroids to PO today


Supplemental O2 needed for discharge as patient still gets hypoxic with 

ambulation


Improved 








(9) HTN (hypertension)


Impression: 


BP elevated


Lisinopril stopped for hyperkalemia 


Started nifedipine today 


Qualifiers: 


   Hypertension type: essential hypertension   Qualified Code(s): I10 - 

Essential (primary) hypertension   











- Current Meds


Current Meds: 





 Current Medications











Generic Name Dose Route Start Last Admin





  Trade Name Freq  PRN Reason Stop Dose Admin


 


Acetaminophen  1,000 mg 07/11/17 12:00 07/15/17 13:34





  Tylenol  PO   500 mg





  TIDWM SAM   Administration


 


Albuterol/Ipratropium  3 ml 07/10/17 19:00 07/15/17 13:26





  Duoneb  INH   3 ml





  RTQID SAM   Administration


 


Chlorhexidine Gluconate  15 ml 07/12/17 21:00 07/15/17 09:24





  Peridex  PO   Not Given





  BID SAM   


 


Diphenhydramine HCl  25 mg 07/10/17 17:00 07/13/17 23:39





  Benadryl  PO   25 mg





  Q6H PRN   Administration





  ITCHING   


 


Docusate Sodium  100 mg 07/11/17 10:00 07/15/17 09:24





  Colace 100mg Capsule  PO   Not Given





  BID SAM   


 


Enoxaparin Sodium  125 mg 07/13/17 15:00 07/15/17 09:19





  Lovenox  SUBQ   125 mg





  BID SAM   Administration


 


Hydromorphone HCl  0.5 mg 07/13/17 16:51 07/14/17 18:48





  Dilaudid Inj  IVP   0.5 mg





  Q2HR PRN   Administration





  PAIN   


 


Lorazepam  0.5 mg 07/12/17 15:21 07/13/17 21:16





  Ativan Inj  IVP   0.5 mg





  Q2HR PRN   Administration





  Anxiety   


 


Methylprednisolone  40 mg 07/13/17 11:00 07/15/17 13:34





  Solu-Medrol (40mg Vial)  IVP   40 mg





  TID SAM   Administration


 


Nifedipine  30 mg 07/15/17 09:00 07/15/17 09:19





  Procardia Xl  PO   30 mg





  DAILY SAM   Administration


 


Non-Formulary Medication  1 puffs 07/11/17 10:00 07/15/17 09:24





  Fluticasone/Vilanterol [Breo Ellipta 100-25 Mcg Inh]  INH   1 puffs





  RTDAILY SAM   Administration


 


Oxycodone HCl  5 - 10 mg 07/11/17 13:30 07/15/17 13:34





  Roxicodone  PO   10 mg





  Q4HR PRN   Administration





  PAIN   


 


Pantoprazole Sodium  40 mg 07/13/17 07:00 07/15/17 06:23





  Protonix  IVP   Not Given





  QDAC SAM   


 


Sodium Chloride  10 ml 07/10/17 17:00 07/14/17 18:48





  Normal Saline Flush 0.9%  IVP   10 ml





  PRN PRN   Administration





  AS NEEDED PER PROVIDER ORDERS   


 


Sodium Chloride  10 ml 07/10/17 22:00 07/15/17 13:35





  Normal Saline Flush 0.9%  IVP   10 ml





  Q8HR SAM   Administration


 


Sodium Phosphate  250 mg 07/14/17 08:00 07/15/17 13:23





  K-Phos Neutral  PO   Not Given





  TIDWM SAM   


 


Warfarin Sodium  5 mg 07/13/17 15:00 07/15/17 13:35





  Coumadin  PO   5 mg





  QDWARFARIN SAM   Administration














- Lab Result


Lab results reviewed: Yes


Fish Bone Diagrams: 


 07/15/17 05:33





 07/15/17 05:33





- Diagnostic Imaging Results


Diagnostic Imaging Results: Final report reviewed





- Additional Planning


Condition/Complexity: Improved


My Orders: 





My Active Orders





07/15/17 09:00


NIFEdipine [Procardia Xl]   30 mg PO DAILY 





07/16/17 05:00


CBC - COMP BLD CT W/AUTO DIFF [HEME] DAILYLAB 


CMP, RFLX TO IONIZED CA IF [CHEM] DAILYLAB 


MAGNESIUM [CHEM] DAILYLAB 


PHOSPHORUS [CHEM] DAILYLAB 


PT WITH INR [COAG] DAILYLAB 





07/17/17 05:00


CBC - COMP BLD CT W/AUTO DIFF [HEME] DAILYLAB 


CMP, RFLX TO IONIZED CA IF [CHEM] DAILYLAB 


MAGNESIUM [CHEM] DAILYLAB 


PHOSPHORUS [CHEM] DAILYLAB 


PT WITH INR [COAG] DAILYLAB 





07/18/17 05:00


PT WITH INR [COAG] DAILYLAB 











Consult/Specialty: PT


Plan Discussed with:: Patient


Time Spent: 31-60 minutes





Subjective





- Subjective


Patient Reports: Feeling Better (Patient states he feels better. He is doing 

better with PT but according to PT he is not taking directions well. Patient 

states he has a cough with sputum production. He also had pleurtic chest pain. 

His breathing is better. His hip pain is controlled as long as he is not moving 

but when he moves it hurts.)


Nursing Reports: No Complaints





Objective


Vital Signs: 





 Vital Signs - 24 hr











  07/14/17 07/14/17 07/14/17





  15:10 16:03 19:55


 


Temperature  36.9 C 


 


Heart Rate 98  94


 


Heart Rate [  95 





Brachial]   


 


Heart Rate [   





Monitoring   





electrodes]   


 


Respiratory 20 20 18





Rate   


 


Blood Pressure  146/71 H 





[Left Brachial   





artery]   


 


Blood Pressure   





[Right Brachial   





artery]   


 


O2 Saturation  93 














  07/15/17 07/15/17 07/15/17





  00:06 07:50 09:28


 


Temperature 36.6 C 36.7 C 


 


Heart Rate   82


 


Heart Rate [  93 





Brachial]   


 


Heart Rate [ 99  





Monitoring   





electrodes]   


 


Respiratory 18 14 20





Rate   


 


Blood Pressure 151/80 H  





[Left Brachial   





artery]   


 


Blood Pressure  159/80 H 





[Right Brachial   





artery]   


 


O2 Saturation 95 95 








 Oxygen











O2 Source [With Activity]      to 93% on 3L O2 w/PLB


 


O2 Source [Without Activity]   to 93% on 3L O2 w/ PLB


 


O2 Source                      Nasal cannula














I&O (Last 24 Hrs): 





 Intake and Output Totals x24h











 07/13/17 07/14/17 07/15/17





 23:59 23:59 23:59


 


Intake Total 2935 2100 992


 


Output Total 2095 1450 350


 


Balance 840 650 642











General: Alert, Oriented x3, Cooperative, No acute distress


HEENT: Atraumatic, PERRLA, EOMI, Mucous membr. moist/pink


Neck: Supple, No JVD, No thyromegaly, +2 carotid pulse wo bruit, No LAD


Lymphatic: no adenopathy


Neuro: Alert, Non Focal, CN 2-12 Grossly Intact, Oriented Times 3


Cardiovascular: Regular rate, Normal S1, Normal S2, No murmurs


Respiratory: No respiratory distress, Wheezes (scattered, mild), Rhonchi (

bilateral mild)


Abdomen: Normal bowel sounds, Soft, No tenderness, No hepatospenomegaly, No 

masses


Extremities: No clubbing, No cyanosis, Normal pulses, Other (Bilateral ankle 

swelling, right hip surgical site appears clean, Limited ROM around the right 

hip but improving.)


Skin: No rashes, No breakdown





- Results


Results: 





 Laboratory Results











WBC  15.1 x10^3/uL (4.8-10.8)  H  07/15/17  05:33    


 


RBC  3.11 10^6/uL (4.70-6.10)  L  07/15/17  05:33    


 


Hgb  9.9 g/dL (14.0-18.0)  L  07/15/17  05:33    


 


Hct  30.0 % (42.0-52.0)  L  07/15/17  05:33    


 


MCV  96.4 fL (80.0-94.0)  H  07/15/17  05:33    


 


MCH  31.7 pg (27.0-31.0)  H  07/15/17  05:33    


 


MCHC  32.9 g/dL (32.0-36.0)   07/15/17  05:33    


 


RDW  14.1 % (12.0-15.0)   07/15/17  05:33    


 


Plt Count  257 10^3/uL (130-450)   07/15/17  05:33    


 


MPV  8.6 fL (7.4-11.4)   07/15/17  05:33    


 


Neut #  13.0 10^3/uL (1.5-6.6)  H  07/15/17  05:33    


 


Lymph #  0.6 10^3/uL (1.5-3.5)  L  07/15/17  05:33    


 


Mono #  1.5 10^3/uL (0.0-1.0)  H  07/15/17  05:33    


 


Eos #  0.0 10^3/uL (0.0-0.7)   07/15/17  05:33    


 


Baso #  0.0 10^3/uL (0.0-0.1)   07/15/17  05:33    


 


Absolute Nucleated RBC  0.08 x10^3/uL  07/15/17  05:33    


 


Nucleated RBCs  0.5 /100WBC  07/15/17  05:33    


 


PT  12.9 secs (9.9-12.6)  H  07/15/17  05:33    


 


INR  1.1  (0.8-1.2)   07/15/17  05:33    


 


D-Dimer  579.3 ng/mL (200.0-255.0)  H  07/12/17  16:48    


 


Bld Gas Analysis Time  0320   07/13/17  03:00    


 


Sample Site  LEFT RADIAL   07/13/17  03:00    


 


ABG pH  7.27  (7.35-7.45)  L  07/13/17  03:00    


 


ABG pCO2  60 mmHg (34-45)  H*  07/13/17  03:00    


 


ABG pO2  81 mmHg ()   07/13/17  03:00    


 


ABG HCO3  27.3 mmol/L (22.0-26.0)  H  07/13/17  03:00    


 


ABG Total CO2  29.0 MMOL/L (21.0-29.0)   07/13/17  03:00    


 


ABG O2 Saturation  94 % (94-98)   07/13/17  03:00    


 


ABG Oximetry Spot Check  95 %  07/13/17  03:00    


 


ABG Base Excess  0.0 mmol/L (-2.0-3.0)   07/13/17  03:00    


 


Real Test  POSITIVE   07/13/17  03:00    


 


VBG pH  7.291  (7.31-7.41)  L  07/13/17  04:50    


 


Ionized Calcium  1.18 mmol/L (1.15-1.33)   07/13/17  04:50    


 


Respiration Rate  18 b/min  07/13/17  03:00    


 


O2 Delivery Device  BiPAP   07/13/17  03:00    


 


O2 Liters/Min  3.00 LPM  07/12/17  11:50    


 


Vent Mode  SYNCHRONOUS/TIMES   07/13/17  03:00    


 


FiO2  35.00   07/13/17  03:00    


 


Pressure Support Vent  10 cmH2O  07/12/17  17:05    


 


EPAP  5 cmH2O  07/13/17  03:00    


 


IPAP  15 cmH2O  07/13/17  03:00    


 


Sodium  140 mmol/L (135-145)   07/15/17  05:33    


 


Potassium  4.8 mmol/L (3.5-5.0)   07/15/17  05:33    


 


Chloride  104 mmol/L (101-111)   07/15/17  05:33    


 


Carbon Dioxide  27 mmol/L (21-32)   07/15/17  05:33    


 


Anion Gap  9.0  (6-13)   07/15/17  05:33    


 


BUN  32 mg/dL (6-20)  H  07/15/17  05:33    


 


Creatinine  0.8 mg/dL (0.6-1.2)   07/15/17  05:33    


 


Estimated GFR (MDRD)  97  (>89)   07/15/17  05:33    


 


Glucose  148 mg/dL ()  H  07/15/17  05:33    


 


Lactic Acid  1.4 mmol/L (0.5-2.2)   07/11/17  20:38    


 


Calcium  8.6 mg/dL (8.5-10.3)   07/15/17  05:33    


 


Ionized Calcium  NO   07/15/17  05:33    


 


Phosphorus  2.5 mg/dL (2.5-4.6)   07/15/17  05:33    


 


Magnesium  2.1 mg/dL (1.7-2.8)   07/15/17  05:33    


 


Total Bilirubin  0.6 mg/dL (0.2-1.0)   07/15/17  05:33    


 


AST  19 IU/L (10-42)   07/15/17  05:33    


 


ALT  20 IU/L (10-60)   07/15/17  05:33    


 


Alkaline Phosphatase  54 IU/L ()   07/15/17  05:33    


 


Troponin I  < 0.04 ng/mL (<0.49)   07/12/17  12:20    


 


B-Natriuretic Peptide  87 pg/mL (5-100)   07/12/17  12:20    


 


Total Protein  6.9 g/dL (6.7-8.2)   07/15/17  05:33    


 


Albumin  2.8 g/dL (3.2-5.5)  L  07/15/17  05:33    


 


Globulin  4.1 g/dL (2.1-4.2)   07/15/17  05:33    


 


Albumin/Globulin Ratio  0.7  (1.0-2.2)  L  07/15/17  05:33    


 


Urine Color  YELLOW   07/11/17  22:15    


 


Urine Clarity  CLEAR  (CLEAR)   07/11/17  22:15    


 


Urine pH  5.5 PH (5.0-7.5)   07/11/17  22:15    


 


Ur Specific Gravity  1.025  (1.002-1.030)   07/11/17  22:15    


 


Urine Protein  NEGATIVE mg/dL (NEGATIVE)   07/11/17  22:15    


 


Urine Glucose (UA)  NEGATIVE mg/dL (NEGATIVE)   07/11/17  22:15    


 


Urine Ketones  TRACE mg/dL (NEGATIVE)   07/11/17  22:15    


 


Urine Occult Blood  NEGATIVE  (NEGATIVE)   07/11/17  22:15    


 


Urine Nitrite  NEGATIVE  (NEGATIVE)   07/11/17  22:15    


 


Urine Bilirubin  NEGATIVE  (NEGATIVE)   07/11/17  22:15    


 


Urine Urobilinogen  0.2 (NORMAL) E.U./dL (NORMAL)   07/11/17  22:15    


 


Ur Leukocyte Esterase  NEGATIVE  (NEGATIVE)   07/11/17  22:15    


 


Ur Microscopic Review  NOT INDICATED   07/11/17  22:15    


 


Urine Culture Comments  NOT INDICATED   07/11/17  22:15    


 


Urine Creatinine  144.9 mg/dL  07/12/17  13:35    


 


Urine Sodium  51.0 mmol/L  07/12/17  13:35

## 2017-07-16 LAB
ALBUMIN/GLOB SERPL: 0.7 {RATIO} (ref 1–2.2)
ANION GAP SERPL CALCULATED.4IONS-SCNC: 7 MMOL/L (ref 6–13)
BASOPHILS NFR BLD AUTO: 0 10^3/UL (ref 0–0.1)
BASOPHILS NFR BLD AUTO: 0.1 %
BILIRUB BLD-MCNC: 0.4 MG/DL (ref 0.2–1)
BUN SERPL-MCNC: 31 MG/DL (ref 6–20)
CALCIUM UR-MCNC: 8.4 MG/DL (ref 8.5–10.3)
CHLORIDE SERPL-SCNC: 102 MMOL/L (ref 101–111)
CO2 SERPL-SCNC: 29 MMOL/L (ref 21–32)
CREAT SERPLBLD-SCNC: 0.6 MG/DL (ref 0.6–1.2)
EOSINOPHIL # BLD AUTO: 0 10^3/UL (ref 0–0.7)
EOSINOPHIL NFR BLD AUTO: 0.1 %
ERYTHROCYTE [DISTWIDTH] IN BLOOD BY AUTOMATED COUNT: 14.1 % (ref 12–15)
GFRSERPLBLD MDRD-ARVRAT: 136 ML/MIN/{1.73_M2} (ref 89–?)
GLOBULIN SER-MCNC: 3.7 G/DL (ref 2.1–4.2)
GLUCOSE SERPL-MCNC: 144 MG/DL (ref 70–100)
HCT VFR BLD AUTO: 27.6 % (ref 42–52)
HGB UR QL STRIP: 9.4 G/DL (ref 14–18)
INR PPP: 1.5 (ref 0.8–1.2)
LYMPHOCYTES # SPEC AUTO: 1.2 10^3/UL (ref 1.5–3.5)
LYMPHOCYTES NFR BLD AUTO: 7.8 %
MAGNESIUM SERPL-MCNC: 2 MG/DL (ref 1.7–2.8)
MCH RBC QN AUTO: 32.9 PG (ref 27–31)
MCHC RBC AUTO-ENTMCNC: 34.1 G/DL (ref 32–36)
MCV RBC AUTO: 96.5 FL (ref 80–94)
MONOCYTES # BLD AUTO: 2.2 10^3/UL (ref 0–1)
MONOCYTES NFR BLD AUTO: 14.9 %
NEUTROPHILS # BLD AUTO: 11.4 10^3/UL (ref 1.5–6.6)
NEUTROPHILS # SNV AUTO: 14.8 X10^3/UL (ref 4.8–10.8)
NEUTROPHILS NFR BLD AUTO: 77.1 %
NP AUTO DIFFERENTIAL?: NO
NP MAN DIFFERENTIAL?: YES
NRBC # BLD AUTO: 0.7 /100WBC
PDW BLD AUTO: 8.3 FL (ref 7.4–11.4)
PHOSPHATE BLD-MCNC: 3.2 MG/DL (ref 2.5–4.6)
PLAT MORPH BLD: (no result)
PLATELET BLD QL SMEAR: (no result)
POTASSIUM SERPL-SCNC: 4.7 MMOL/L (ref 3.5–5)
PROT SPEC-MCNC: 6.3 G/DL (ref 6.7–8.2)
PROTHROM ACT/NOR PPP: 16.5 SECS (ref 9.9–12.6)
RBC MAR: 2.86 10^6/UL (ref 4.7–6.1)
SODIUM SERPLBLD-SCNC: 138 MMOL/L (ref 135–145)
WBC # BLD: 14.8 X10^3/UL

## 2017-07-16 RX ADMIN — ENOXAPARIN SODIUM SCH MG: 150 INJECTION SUBCUTANEOUS at 08:22

## 2017-07-16 RX ADMIN — IPRATROPIUM BROMIDE AND ALBUTEROL SULFATE SCH ML: 2.5; .5 SOLUTION RESPIRATORY (INHALATION) at 11:19

## 2017-07-16 RX ADMIN — SODIUM PHOSPHATE, DIBASIC, ANHYDROUS, POTASSIUM PHOSPHATE, MONOBASIC, AND SODIUM PHOSPHATE, MONOBASIC, MONOHYDRATE SCH MG: 852; 155; 130 TABLET, COATED ORAL at 08:21

## 2017-07-16 RX ADMIN — SODIUM PHOSPHATE, DIBASIC, ANHYDROUS, POTASSIUM PHOSPHATE, MONOBASIC, AND SODIUM PHOSPHATE, MONOBASIC, MONOHYDRATE SCH MG: 852; 155; 130 TABLET, COATED ORAL at 12:17

## 2017-07-16 RX ADMIN — ENOXAPARIN SODIUM SCH MG: 150 INJECTION SUBCUTANEOUS at 21:13

## 2017-07-16 RX ADMIN — SODIUM CHLORIDE SCH MG: 9 INJECTION, SOLUTION INTRAVENOUS at 06:08

## 2017-07-16 RX ADMIN — OXYCODONE PRN MG: 5 TABLET ORAL at 08:20

## 2017-07-16 RX ADMIN — ACETAMINOPHEN SCH MG: 500 TABLET ORAL at 12:17

## 2017-07-16 RX ADMIN — SODIUM CHLORIDE, PRESERVATIVE FREE SCH ML: 5 INJECTION INTRAVENOUS at 14:38

## 2017-07-16 RX ADMIN — SODIUM CHLORIDE, PRESERVATIVE FREE SCH ML: 5 INJECTION INTRAVENOUS at 21:15

## 2017-07-16 RX ADMIN — OXYCODONE PRN MG: 5 TABLET ORAL at 03:31

## 2017-07-16 RX ADMIN — CHLORHEXIDINE GLUCONATE SCH: 1.2 SOLUTION ORAL at 21:06

## 2017-07-16 RX ADMIN — IPRATROPIUM BROMIDE AND ALBUTEROL SULFATE SCH: 2.5; .5 SOLUTION RESPIRATORY (INHALATION) at 18:45

## 2017-07-16 RX ADMIN — CHLORHEXIDINE GLUCONATE SCH: 1.2 SOLUTION ORAL at 08:22

## 2017-07-16 RX ADMIN — ACETAMINOPHEN SCH MG: 500 TABLET ORAL at 16:57

## 2017-07-16 RX ADMIN — SODIUM PHOSPHATE, DIBASIC, ANHYDROUS, POTASSIUM PHOSPHATE, MONOBASIC, AND SODIUM PHOSPHATE, MONOBASIC, MONOHYDRATE SCH: 852; 155; 130 TABLET, COATED ORAL at 16:57

## 2017-07-16 RX ADMIN — NIFEDIPINE SCH MG: 30 TABLET, FILM COATED, EXTENDED RELEASE ORAL at 08:21

## 2017-07-16 RX ADMIN — WARFARIN SODIUM SCH MG: 5 TABLET ORAL at 14:38

## 2017-07-16 RX ADMIN — DOCUSATE SODIUM SCH: 100 CAPSULE, LIQUID FILLED ORAL at 08:22

## 2017-07-16 RX ADMIN — IPRATROPIUM BROMIDE AND ALBUTEROL SULFATE SCH ML: 2.5; .5 SOLUTION RESPIRATORY (INHALATION) at 20:40

## 2017-07-16 RX ADMIN — DOCUSATE SODIUM SCH: 100 CAPSULE, LIQUID FILLED ORAL at 21:06

## 2017-07-16 RX ADMIN — OXYCODONE PRN MG: 5 TABLET ORAL at 16:57

## 2017-07-16 RX ADMIN — ACETAMINOPHEN SCH MG: 500 TABLET ORAL at 08:21

## 2017-07-16 RX ADMIN — IPRATROPIUM BROMIDE AND ALBUTEROL SULFATE SCH ML: 2.5; .5 SOLUTION RESPIRATORY (INHALATION) at 07:32

## 2017-07-16 RX ADMIN — OXYCODONE PRN MG: 5 TABLET ORAL at 03:34

## 2017-07-16 RX ADMIN — SODIUM CHLORIDE, PRESERVATIVE FREE SCH ML: 5 INJECTION INTRAVENOUS at 06:07

## 2017-07-16 RX ADMIN — OXYCODONE PRN MG: 5 TABLET ORAL at 12:17

## 2017-07-16 RX ADMIN — OXYCODONE PRN MG: 5 TABLET ORAL at 22:17

## 2017-07-16 NOTE — PROVIDER PROGRESS NOTE
Assessment/Plan





- Problem List


(1) Acute respiratory failure with hypoxia and hypercapnia


Assessment/Plan: 


Secondary to pulmonary embolism, COPD and decreased respiratory drive from 

opioids 


Patient was placed on BiPAP overnight although PCO2 was not improved patient 

clinically much better with improved mentation 


CTA of lungs revealed bilateral PEs


Patient was wheezy therefore placed on IV steroids will change to PO


On lovenox for PE


BiPAP discontinued 


Patient likely has MAURICIO needs outpatient sleep study


Resolved








(2) Pulmonary Embolism


Impression: 


Patient had acute respiratory failure on 07/12/17 found to have hypercapnia and 

hypoxia 


CXR negative


D dimer elevated and patient had tachycardia with recent surgery


CTA of the thorax revealed bilateral PEs


Patient started on lovenox for bridging and coumadin daily


INR is 1.5 this am


Will continue to monitor INR daily 





(3) MILAN (acute kidney injury)


Impression: 


Resolved


Crt down to 0.6


Avoid nephrotoxic agents








(4) Hyperkalemia


Impression: 


Patient has chronic hyperkalemia likely secondary to lisinopril


Lisinopril stopped


Renal failure improved


K down to 4.7


Resolved








(5) Acute encephalopathy


Impression: 


Secondary to hypercapnia and sedation from pain meds


Resolved








(6) Acute blood loss as cause of postoperative anemia


Impression: 


Secondary to surgery 


Patient with blood loss anemia


Hb stable today at 9.9


On lovenox and coumadin for PE


Monitor daily








(7) Status post total hip replacement, right


Impression: 


POD#5


Pain controlled


PT recs SNF


Ortho on board changed dressing today 


Patient did not get authorized for SNF over weekend therefore will have to wait 

till Monday for discharge to [facility] in [place] 








(8) Moderate COPD (chronic obstructive pulmonary disease)


Impression: 


Acute exacerbation with PEs


Did have hypercapnia and hypoxia


Continue Duonebs and will switch steroids to PO today


Supplemental O2 needed for discharge as patient still gets hypoxic with 

ambulation


Improved 








(9) HTN (hypertension)


Impression: 


BP elevated


Lisinopril stopped for hyperkalemia 


Started nifedipine 


BP improved


Qualifiers: 


   Hypertension type: essential hypertension   Qualified Code(s): I10 - 

Essential (primary) hypertension   








- Current Meds


Current Meds: 





 Current Medications











Generic Name Dose Route Start Last Admin





  Trade Name Freq  PRN Reason Stop Dose Admin


 


Acetaminophen  1,000 mg 07/11/17 12:00 07/16/17 16:57





  Tylenol  PO   1,000 mg





  TIDWM SAM   Administration


 


Albuterol/Ipratropium  3 ml 07/10/17 19:00 07/16/17 18:45





  Duoneb  INH   Not Given





  RTQID SAM   


 


Chlorhexidine Gluconate  15 ml 07/12/17 21:00 07/16/17 08:22





  Peridex  PO   Not Given





  BID SAM   


 


Diphenhydramine HCl  25 mg 07/10/17 17:00 07/13/17 23:39





  Benadryl  PO   25 mg





  Q6H PRN   Administration





  ITCHING   


 


Docusate Sodium  100 mg 07/11/17 10:00 07/16/17 08:22





  Colace 100mg Capsule  PO   Not Given





  BID SAM   


 


Enoxaparin Sodium  125 mg 07/13/17 15:00 07/16/17 08:22





  Lovenox  SUBQ   125 mg





  BID SAM   Administration


 


Hydromorphone HCl  0.5 mg 07/13/17 16:51 07/15/17 23:37





  Dilaudid Inj  IVP   0.5 mg





  Q2HR PRN   Administration





  PAIN   


 


Lorazepam  0.5 mg 07/12/17 15:21 07/13/17 21:16





  Ativan Inj  IVP   0.5 mg





  Q2HR PRN   Administration





  Anxiety   


 


Nifedipine  30 mg 07/15/17 09:00 07/16/17 08:21





  Procardia Xl  PO   30 mg





  DAILY SAM   Administration


 


Non-Formulary Medication  1 puffs 07/11/17 10:00 07/16/17 07:37





  Fluticasone/Vilanterol [Breo Ellipta 100-25 Mcg Inh]  INH   1 puffs





  RTDAILY SAM   Administration


 


Oxycodone HCl  5 - 10 mg 07/11/17 13:30 07/16/17 16:57





  Roxicodone  PO   10 mg





  Q4HR PRN   Administration





  PAIN   


 


Pantoprazole Sodium  40 mg 07/13/17 07:00 07/16/17 06:08





  Protonix  IVP   40 mg





  QDAC SAM   Administration


 


Prednisone  40 mg 07/16/17 08:00 07/16/17 08:20





  Deltasone  PO   40 mg





  DAILYWM SAM   Administration


 


Sodium Chloride  10 ml 07/10/17 17:00 07/15/17 23:38





  Normal Saline Flush 0.9%  IVP   10 ml





  PRN PRN   Administration





  AS NEEDED PER PROVIDER ORDERS   


 


Sodium Chloride  10 ml 07/10/17 22:00 07/16/17 14:38





  Normal Saline Flush 0.9%  IVP   10 ml





  Q8HR SAM   Administration


 


Sodium Phosphate  250 mg 07/14/17 08:00 07/16/17 16:57





  K-Phos Neutral  PO   Not Given





  TIDWM [facility]   


 


Warfarin Sodium  5 mg 07/13/17 15:00 07/16/17 14:38





  Coumadin  PO   5 mg





  QDWARFARIN SAM   Administration














- Lab Result


Lab results reviewed: Yes


Fish Bone Diagrams: 


 07/16/17 05:23





 07/16/17 05:23





- EKG Results


EKG Interpreted Independently: Yes





- Diagnostic Imaging Results


Diagnostic Imaging Results: Final report reviewed





- Additional Planning


Condition/Complexity: Stable


My Orders: 





My Active Orders





07/16/17 08:00


predniSONE [Deltasone]   40 mg PO DAILYWM 





07/17/17 05:00


CBC - COMP BLD CT W/AUTO DIFF [HEME] DAILYLAB 


CMP, RFLX TO IONIZED CA IF [CHEM] DAILYLAB 


MAGNESIUM [CHEM] DAILYLAB 


PHOSPHORUS [CHEM] DAILYLAB 


PT WITH INR [COAG] DAILYLAB 





07/18/17 05:00


PT WITH INR [COAG] DAILYLAB 











Consult/Specialty: PT, Other (Ortho)


Plan Discussed with:: Patient


Time Spent: 31-60 minutes





Subjective





- Subjective


Patient Reports: Pain (This morning pain is bad as pateint feels he over did it 

yesterday. Not wanting to work with PT this morning because he is so sore. No 

fevers or worsening dypnea)





Objective


Vital Signs: 





 Vital Signs - 24 hr











  07/15/17 07/15/17 07/16/17





  20:46 23:28 07:32


 


Temperature  36.5 C 


 


Heart Rate 103 H  90


 


Heart Rate [   





Brachial]   


 


Heart Rate [  96 





Monitoring   





electrodes]   


 


Respiratory 20 18 20





Rate   


 


Blood Pressure  125/65 





[Right Brachial   





artery]   


 


O2 Saturation  94 














  07/16/17 07/16/17 07/16/17





  08:03 11:20 16:19


 


Temperature 37.0 C  36.7 C


 


Heart Rate  104 H 


 


Heart Rate [ 89  99





Brachial]   


 


Heart Rate [   





Monitoring   





electrodes]   


 


Respiratory 16 18 14





Rate   


 


Blood Pressure 162/83 H  117/66





[Right Brachial   





artery]   


 


O2 Saturation 94  92








 Oxygen











O2 Source [With Activity]      to 93% on 3L O2 w/PLB


 


O2 Source [Without Activity]   to 93% on 3L O2 w/ PLB


 


O2 Source                      Nasal cannula














I&O (Last 24 Hrs): 





 Intake and Output Totals x24h











 07/14/17 07/15/17 07/16/17





 23:59 23:59 23:59


 


Intake Total 2100 1952 1616


 


Output Total 9067 195 0053


 


Balance 650 1127 541











General: Alert, Oriented x3, Cooperative, Mild distress (pain in the right hip 

with swelling)


HEENT: Atraumatic, PERRLA, EOMI, Mucous membr. moist/pink


Neck: Supple, No JVD, No thyromegaly, +2 carotid pulse wo bruit, No LAD


Lymphatic: no adenopathy


Neuro: Alert, Non Focal, CN 2-12 Grossly Intact, Oriented Times 3


Cardiovascular: Regular rate, Normal S1, Normal S2, No murmurs


Respiratory: Chest non-tender, No respiratory distress, Rales (bases)


Abdomen: Normal bowel sounds, Soft, No tenderness, No hepatospenomegaly


Extremities: No clubbing, No cyanosis, Normal pulses, Other (Right hip swollen, 

decreased ROM.)


Skin: No rashes, No breakdown





- Results


Results: 





 Laboratory Results











WBC  14.8 x10^3/uL (4.8-10.8)  H  07/16/17  05:23    


 


RBC  2.86 10^6/uL (4.70-6.10)  L  07/16/17  05:23    


 


Hgb  9.4 g/dL (14.0-18.0)  L  07/16/17  05:23    


 


Hct  27.6 % (42.0-52.0)  L  07/16/17  05:23    


 


MCV  96.5 fL (80.0-94.0)  H  07/16/17  05:23    


 


MCH  32.9 pg (27.0-31.0)  H  07/16/17  05:23    


 


MCHC  34.1 g/dL (32.0-36.0)   07/16/17  05:23    


 


RDW  14.1 % (12.0-15.0)   07/16/17  05:23    


 


Plt Count  265 10^3/uL (130-450)   07/16/17  05:23    


 


MPV  8.3 fL (7.4-11.4)   07/16/17  05:23    


 


Neut #  11.4 10^3/uL (1.5-6.6)  H  07/16/17  05:23    


 


Lymph #  1.2 10^3/uL (1.5-3.5)  L  07/16/17  05:23    


 


Mono #  2.2 10^3/uL (0.0-1.0)  H  07/16/17  05:23    


 


Eos #  0.0 10^3/uL (0.0-0.7)   07/16/17  05:23    


 


Baso #  0.0 10^3/uL (0.0-0.1)   07/16/17  05:23    


 


Absolute Nucleated RBC  0.10 x10^3/uL  07/16/17  05:23    


 


Band Neuts % (Manual)  Not Reportable   07/16/17  05:23    


 


Nucleated RBCs  0.7 /100WBC  07/16/17  05:23    


 


Differential Comment  MANUAL=AUTO DIFF   07/16/17  05:23    


 


Platelet Estimate  NORMAL (130-450,000)  (NORMAL)   07/16/17  05:23    


 


Platelet Morphology  NORMAL APPEARANCE  (NORMAL)   07/16/17  05:23    


 


RBC Morph Micro Appear  NORMAL APPEARANCE  (NORMAL)   07/16/17  05:23    


 


PT  16.5 secs (9.9-12.6)  H  07/16/17  05:23    


 


INR  1.5  (0.8-1.2)  H  07/16/17  05:23    


 


D-Dimer  579.3 ng/mL (200.0-255.0)  H  07/12/17  16:48    


 


Bld Gas Analysis Time  0320   07/13/17  03:00    


 


Sample Site  LEFT RADIAL   07/13/17  03:00    


 


ABG pH  7.27  (7.35-7.45)  L  07/13/17  03:00    


 


ABG pCO2  60 mmHg (34-45)  H*  07/13/17  03:00    


 


ABG pO2  81 mmHg ()   07/13/17  03:00    


 


ABG HCO3  27.3 mmol/L (22.0-26.0)  H  07/13/17  03:00    


 


ABG Total CO2  29.0 MMOL/L (21.0-29.0)   07/13/17  03:00    


 


ABG O2 Saturation  94 % (94-98)   07/13/17  03:00    


 


ABG Oximetry Spot Check  95 %  07/13/17  03:00    


 


ABG Base Excess  0.0 mmol/L (-2.0-3.0)   07/13/17  03:00    


 


Real Test  POSITIVE   07/13/17  03:00    


 


VBG pH  7.291  (7.31-7.41)  L  07/13/17  04:50    


 


Ionized Calcium  1.18 mmol/L (1.15-1.33)   07/13/17  04:50    


 


Respiration Rate  18 b/min  07/13/17  03:00    


 


O2 Delivery Device  BiPAP   07/13/17  03:00    


 


O2 Liters/Min  3.00 LPM  07/12/17  11:50    


 


Vent Mode  SYNCHRONOUS/TIMES   07/13/17  03:00    


 


FiO2  35.00   07/13/17  03:00    


 


Pressure Support Vent  10 cmH2O  07/12/17  17:05    


 


EPAP  5 cmH2O  07/13/17  03:00    


 


IPAP  15 cmH2O  07/13/17  03:00    


 


Sodium  138 mmol/L (135-145)   07/16/17  05:23    


 


Potassium  4.7 mmol/L (3.5-5.0)   07/16/17  05:23    


 


Chloride  102 mmol/L (101-111)   07/16/17  05:23    


 


Carbon Dioxide  29 mmol/L (21-32)   07/16/17  05:23    


 


Anion Gap  7.0  (6-13)   07/16/17  05:23    


 


BUN  31 mg/dL (6-20)  H  07/16/17  05:23    


 


Creatinine  0.6 mg/dL (0.6-1.2)   07/16/17  05:23    


 


Estimated GFR (MDRD)  136  (>89)   07/16/17  05:23    


 


Glucose  144 mg/dL ()  H  07/16/17  05:23    


 


Lactic Acid  1.4 mmol/L (0.5-2.2)   07/11/17  20:38    


 


Calcium  8.4 mg/dL (8.5-10.3)  L  07/16/17  05:23    


 


Ionized Calcium  NO   07/16/17  05:23    


 


Phosphorus  3.2 mg/dL (2.5-4.6)   07/16/17  05:23    


 


Magnesium  2.0 mg/dL (1.7-2.8)   07/16/17  05:23    


 


Total Bilirubin  0.4 mg/dL (0.2-1.0)   07/16/17  05:23    


 


AST  20 IU/L (10-42)   07/16/17  05:23    


 


ALT  21 IU/L (10-60)   07/16/17  05:23    


 


Alkaline Phosphatase  51 IU/L ()   07/16/17  05:23    


 


Troponin I  < 0.04 ng/mL (<0.49)   07/12/17  12:20    


 


B-Natriuretic Peptide  87 pg/mL (5-100)   07/12/17  12:20    


 


Total Protein  6.3 g/dL (6.7-8.2)  L  07/16/17  05:23    


 


Albumin  2.6 g/dL (3.2-5.5)  L  07/16/17  05:23    


 


Globulin  3.7 g/dL (2.1-4.2)   07/16/17  05:23    


 


Albumin/Globulin Ratio  0.7  (1.0-2.2)  L  07/16/17  05:23    


 


Urine Color  YELLOW   07/11/17  22:15    


 


Urine Clarity  CLEAR  (CLEAR)   07/11/17  22:15    


 


Urine pH  5.5 PH (5.0-7.5)   07/11/17  22:15    


 


Ur Specific Gravity  1.025  (1.002-1.030)   07/11/17  22:15    


 


Urine Protein  NEGATIVE mg/dL (NEGATIVE)   07/11/17  22:15    


 


Urine Glucose (UA)  NEGATIVE mg/dL (NEGATIVE)   07/11/17  22:15    


 


Urine Ketones  TRACE mg/dL (NEGATIVE)   07/11/17  22:15    


 


Urine Occult Blood  NEGATIVE  (NEGATIVE)   07/11/17  22:15    


 


Urine Nitrite  NEGATIVE  (NEGATIVE)   07/11/17  22:15    


 


Urine Bilirubin  NEGATIVE  (NEGATIVE)   07/11/17  22:15    


 


Urine Urobilinogen  0.2 (NORMAL) E.U./dL (NORMAL)   07/11/17  22:15    


 


Ur Leukocyte Esterase  NEGATIVE  (NEGATIVE)   07/11/17  22:15    


 


Ur Microscopic Review  NOT INDICATED   07/11/17  22:15    


 


Urine Culture Comments  NOT INDICATED   07/11/17  22:15    


 


Urine Creatinine  144.9 mg/dL  07/12/17  13:35    


 


Urine Sodium  51.0 mmol/L  07/12/17  13:35

## 2017-07-17 LAB
ALBUMIN/GLOB SERPL: 0.8 {RATIO} (ref 1–2.2)
ANION GAP SERPL CALCULATED.4IONS-SCNC: 7 MMOL/L (ref 6–13)
BASOPHILS NFR BLD AUTO: 0 10^3/UL (ref 0–0.1)
BASOPHILS NFR BLD AUTO: 0.2 %
BILIRUB BLD-MCNC: 0.4 MG/DL (ref 0.2–1)
BUN SERPL-MCNC: 26 MG/DL (ref 6–20)
CA-I SERPL ISE-MCNC: 1.11 MMOL/L (ref 1.15–1.33)
CALCIUM UR-MCNC: 8.2 MG/DL (ref 8.5–10.3)
CHLORIDE SERPL-SCNC: 101 MMOL/L (ref 101–111)
CO2 SERPL-SCNC: 31 MMOL/L (ref 21–32)
CREAT SERPLBLD-SCNC: 0.7 MG/DL (ref 0.6–1.2)
EOSINOPHIL # BLD AUTO: 0.1 10^3/UL (ref 0–0.7)
EOSINOPHIL NFR BLD AUTO: 0.5 %
ERYTHROCYTE [DISTWIDTH] IN BLOOD BY AUTOMATED COUNT: 14.5 % (ref 12–15)
GFRSERPLBLD MDRD-ARVRAT: 114 ML/MIN/{1.73_M2} (ref 89–?)
GLOBULIN SER-MCNC: 3.5 G/DL (ref 2.1–4.2)
GLUCOSE SERPL-MCNC: 82 MG/DL (ref 70–100)
HCT VFR BLD AUTO: 32.2 % (ref 42–52)
HGB UR QL STRIP: 10.6 G/DL (ref 14–18)
INR PPP: 1.4 (ref 0.8–1.2)
LYMPHOCYTES # SPEC AUTO: 2.8 10^3/UL (ref 1.5–3.5)
LYMPHOCYTES NFR BLD AUTO: 20.7 %
MAGNESIUM SERPL-MCNC: 1.9 MG/DL (ref 1.7–2.8)
MCH RBC QN AUTO: 31.5 PG (ref 27–31)
MCHC RBC AUTO-ENTMCNC: 33 G/DL (ref 32–36)
MCV RBC AUTO: 95.6 FL (ref 80–94)
MONOCYTES # BLD AUTO: 1.7 10^3/UL (ref 0–1)
MONOCYTES NFR BLD AUTO: 12.7 %
NEUTROPHILS # BLD AUTO: 9 10^3/UL (ref 1.5–6.6)
NEUTROPHILS # SNV AUTO: 13.6 X10^3/UL (ref 4.8–10.8)
NEUTROPHILS NFR BLD AUTO: 65.9 %
NP AUTO DIFFERENTIAL?: NO
NP MAN DIFFERENTIAL?: YES
NRBC # BLD AUTO: 2.2 /100WBC
PDW BLD AUTO: 7.6 FL (ref 7.4–11.4)
PH BLDV: 7.4 [PH] (ref 7.31–7.41)
PHOSPHATE BLD-MCNC: 3 MG/DL (ref 2.5–4.6)
PLAT MORPH BLD: (no result)
PLATELET BLD QL SMEAR: (no result)
POTASSIUM SERPL-SCNC: 4.3 MMOL/L (ref 3.5–5)
PROT SPEC-MCNC: 6.3 G/DL (ref 6.7–8.2)
PROTHROM ACT/NOR PPP: 16.4 SECS (ref 9.9–12.6)
RBC MAR: 3.37 10^6/UL (ref 4.7–6.1)
SODIUM SERPLBLD-SCNC: 139 MMOL/L (ref 135–145)
WBC # BLD: 13.6 X10^3/UL

## 2017-07-17 RX ADMIN — OXYCODONE PRN MG: 5 TABLET ORAL at 20:19

## 2017-07-17 RX ADMIN — CHLORHEXIDINE GLUCONATE SCH ML: 1.2 SOLUTION ORAL at 20:19

## 2017-07-17 RX ADMIN — SODIUM PHOSPHATE, DIBASIC, ANHYDROUS, POTASSIUM PHOSPHATE, MONOBASIC, AND SODIUM PHOSPHATE, MONOBASIC, MONOHYDRATE SCH MG: 852; 155; 130 TABLET, COATED ORAL at 08:21

## 2017-07-17 RX ADMIN — NIFEDIPINE SCH MG: 30 TABLET, FILM COATED, EXTENDED RELEASE ORAL at 09:13

## 2017-07-17 RX ADMIN — SODIUM CHLORIDE SCH MG: 9 INJECTION, SOLUTION INTRAVENOUS at 06:41

## 2017-07-17 RX ADMIN — SODIUM CHLORIDE, PRESERVATIVE FREE SCH ML: 5 INJECTION INTRAVENOUS at 14:41

## 2017-07-17 RX ADMIN — OXYCODONE PRN MG: 5 TABLET ORAL at 16:12

## 2017-07-17 RX ADMIN — ACETAMINOPHEN SCH MG: 500 TABLET ORAL at 11:56

## 2017-07-17 RX ADMIN — WARFARIN SODIUM SCH MG: 5 TABLET ORAL at 14:41

## 2017-07-17 RX ADMIN — SODIUM CHLORIDE, PRESERVATIVE FREE SCH ML: 5 INJECTION INTRAVENOUS at 06:42

## 2017-07-17 RX ADMIN — ACETAMINOPHEN SCH MG: 500 TABLET ORAL at 08:21

## 2017-07-17 RX ADMIN — IPRATROPIUM BROMIDE AND ALBUTEROL SULFATE SCH ML: 2.5; .5 SOLUTION RESPIRATORY (INHALATION) at 08:45

## 2017-07-17 RX ADMIN — DOCUSATE SODIUM SCH: 100 CAPSULE, LIQUID FILLED ORAL at 08:09

## 2017-07-17 RX ADMIN — OXYCODONE PRN MG: 5 TABLET ORAL at 06:42

## 2017-07-17 RX ADMIN — IPRATROPIUM BROMIDE AND ALBUTEROL SULFATE SCH ML: 2.5; .5 SOLUTION RESPIRATORY (INHALATION) at 13:25

## 2017-07-17 RX ADMIN — ENOXAPARIN SODIUM SCH MG: 150 INJECTION SUBCUTANEOUS at 08:22

## 2017-07-17 RX ADMIN — IPRATROPIUM BROMIDE AND ALBUTEROL SULFATE SCH ML: 2.5; .5 SOLUTION RESPIRATORY (INHALATION) at 20:54

## 2017-07-17 RX ADMIN — CHLORHEXIDINE GLUCONATE SCH: 1.2 SOLUTION ORAL at 09:46

## 2017-07-17 RX ADMIN — DOCUSATE SODIUM SCH MG: 100 CAPSULE, LIQUID FILLED ORAL at 20:19

## 2017-07-17 RX ADMIN — SODIUM PHOSPHATE, DIBASIC, ANHYDROUS, POTASSIUM PHOSPHATE, MONOBASIC, AND SODIUM PHOSPHATE, MONOBASIC, MONOHYDRATE SCH MG: 852; 155; 130 TABLET, COATED ORAL at 11:56

## 2017-07-17 RX ADMIN — ACETAMINOPHEN SCH MG: 500 TABLET ORAL at 16:12

## 2017-07-17 RX ADMIN — OXYCODONE PRN MG: 5 TABLET ORAL at 11:17

## 2017-07-17 RX ADMIN — IPRATROPIUM BROMIDE AND ALBUTEROL SULFATE SCH ML: 2.5; .5 SOLUTION RESPIRATORY (INHALATION) at 16:50

## 2017-07-17 RX ADMIN — ENOXAPARIN SODIUM SCH MG: 150 INJECTION SUBCUTANEOUS at 20:19

## 2017-07-17 RX ADMIN — OXYCODONE PRN MG: 5 TABLET ORAL at 02:53

## 2017-07-17 RX ADMIN — SODIUM PHOSPHATE, DIBASIC, ANHYDROUS, POTASSIUM PHOSPHATE, MONOBASIC, AND SODIUM PHOSPHATE, MONOBASIC, MONOHYDRATE SCH MG: 852; 155; 130 TABLET, COATED ORAL at 16:12

## 2017-07-17 NOTE — PROVIDER PROGRESS NOTE
Assessment/Plan





- Problem List


(1) Acute respiratory failure with hypoxia and hypercapnia


Assessment/Plan: 


Secondary to pulmonary embolism, COPD and decreased respiratory drive from 

opioids 


Patient was placed on BiPAP overnight although PCO2 was not improved patient 

clinically much better with improved mentation 


CTA of lungs revealed bilateral PEs


Patient was wheezy therefore placed on IV steroids will change to PO


On lovenox for PE


BiPAP discontinued 


Patient likely has MAURICIO needs outpatient sleep study


Resolved








(2) Pulmonary Embolism


Impression: 


Patient had acute respiratory failure on 07/12/17 found to have hypercapnia and 

hypoxia 


CXR negative


D dimer elevated and patient had tachycardia with recent surgery


CTA of the thorax revealed bilateral PEs


Patient started on lovenox for bridging and coumadin daily


INR is 1.4 this am


Will continue to monitor INR daily 





(3) MILAN (acute kidney injury)


Impression: 


Resolved


Crt down to 0.7


Avoid nephrotoxic agents








(4) Hyperkalemia


Impression: 


Patient has chronic hyperkalemia likely secondary to lisinopril


Lisinopril stopped


Renal failure improved


K down to 4.3


Resolved








(5) Acute encephalopathy


Impression: 


Secondary to hypercapnia and sedation from pain meds


Resolved








(6) Acute blood loss as cause of postoperative anemia


Impression: 


Secondary to surgery 


Patient with blood loss anemia


Hb improved today at 10.6


On lovenox and coumadin for PE


Monitor daily








(7) Status post total hip replacement, right


Impression: 


POD#6


Pain controlled


PT recs SNF


Ortho on board changed dressing today 


Patient did not get authorized for SNF over weekend or today therefore will 

have to wait till Tuesday for discharge to [facility] in [place] 








(8) Moderate COPD (chronic obstructive pulmonary disease)


Impression: 


Acute exacerbation with PEs


Did have hypercapnia and hypoxia


Continue Duonebs and will switch steroids to PO today


Supplemental O2 needed for discharge as patient still gets hypoxic with 

ambulation


Improved 








(9) HTN (hypertension)


Impression: 


BP elevated


Lisinopril stopped for hyperkalemia 


Started nifedipine 


BP improved


Qualifiers: 


   Hypertension type: essential hypertension   Qualified Code(s): I10 - 

Essential (primary) hypertension   











- Current Meds


Current Meds: 





 Current Medications











Generic Name Dose Route Start Last Admin





  Trade Name Freq  PRN Reason Stop Dose Admin


 


Acetaminophen  1,000 mg 07/11/17 12:00 07/17/17 16:12





  Tylenol  PO   1,000 mg





  TIDWM SAM   Administration


 


Albuterol/Ipratropium  3 ml 07/10/17 19:00 07/17/17 16:50





  Duoneb  INH   3 ml





  RTQID SAM   Administration


 


Chlorhexidine Gluconate  15 ml 07/12/17 21:00 07/17/17 09:46





  Peridex  PO   Not Given





  BID SAM   


 


Diphenhydramine HCl  25 mg 07/10/17 17:00 07/13/17 23:39





  Benadryl  PO   25 mg





  Q6H PRN   Administration





  ITCHING   


 


Docusate Sodium  100 mg 07/11/17 10:00 07/17/17 08:09





  Colace 100mg Capsule  PO   Not Given





  BID SAM   


 


Enoxaparin Sodium  125 mg 07/13/17 15:00 07/17/17 08:22





  Lovenox  SUBQ   125 mg





  BID SAM   Administration


 


Hydromorphone HCl  0.5 mg 07/13/17 16:51 07/15/17 23:37





  Dilaudid Inj  IVP   0.5 mg





  Q2HR PRN   Administration





  PAIN   


 


Lorazepam  0.5 mg 07/12/17 15:21 07/13/17 21:16





  Ativan Inj  IVP   0.5 mg





  Q2HR PRN   Administration





  Anxiety   


 


Nifedipine  30 mg 07/15/17 09:00 07/17/17 09:13





  Procardia Xl  PO   30 mg





  DAILY SAM   Administration


 


Non-Formulary Medication  1 puffs 07/11/17 10:00 07/17/17 08:45





  Fluticasone/Vilanterol [Breo Ellipta 100-25 Mcg Inh]  INH   1 puffs





  RTDAILY SAM   Administration


 


Oxycodone HCl  5 - 10 mg 07/11/17 13:30 07/17/17 16:12





  Roxicodone  PO   10 mg





  Q4HR PRN   Administration





  PAIN   


 


Pantoprazole Sodium  40 mg 07/13/17 07:00 07/17/17 06:41





  Protonix  IVP   40 mg





  QDAC SAM   Administration


 


Prednisone  40 mg 07/16/17 08:00 07/17/17 08:22





  Deltasone  PO   40 mg





  DAILYWM SAM   Administration


 


Sodium Chloride  10 ml 07/10/17 17:00 07/15/17 23:38





  Normal Saline Flush 0.9%  IVP   10 ml





  PRN PRN   Administration





  AS NEEDED PER PROVIDER ORDERS   


 


Sodium Chloride  10 ml 07/10/17 22:00 07/17/17 14:41





  Normal Saline Flush 0.9%  IVP   10 ml





  Q8HR SAM   Administration


 


Sodium Phosphate  250 mg 07/14/17 08:00 07/17/17 16:12





  K-Phos Neutral  PO   250 mg





  TIDWM SAM   Administration


 


Warfarin Sodium  5 mg 07/13/17 15:00 07/17/17 14:41





  Coumadin  PO   5 mg





  QDWARFARIN SAM   Administration














- Lab Result


Lab results reviewed: Yes


Fish Bone Diagrams: 


 07/17/17 06:05





 07/17/17 06:05





- Diagnostic Imaging Results


Diagnostic Imaging Results: Final report reviewed





- Additional Planning


Condition/Complexity: Improved


My Orders: 





My Active Orders





07/18/17 05:00


PT WITH INR [COAG] DAILYLAB 











Consult/Specialty: PT, Other (Ortho)


Plan Discussed with:: Patient


Time Spent: 31-60 minutes





Subjective





- Subjective


Patient Reports: Resting Comfortably (Patient feels better today after resting 

yesterday. He still has pain in the right hip but states he will try to work 

with PT today.)


Nursing Reports: No Complaints





Objective


Vital Signs: 





 Vital Signs - 24 hr











  07/16/17 07/17/17 07/17/17





  20:40 00:16 08:20


 


Temperature  36.7 C 36.5 C


 


Heart Rate 93  


 


Heart Rate [  101 H 97





Brachial]   


 


Heart Rate [   





Monitoring   





electrodes]   


 


Respiratory 16 20 14





Rate   


 


Blood Pressure  150/79 H 163/93 H





[Right Brachial   





artery]   


 


O2 Saturation  93 91 L














  07/17/17 07/17/17 07/17/17





  08:45 13:25 16:00


 


Temperature   36.5 C


 


Heart Rate 105 H 105 H 


 


Heart Rate [   





Brachial]   


 


Heart Rate [   101 H





Monitoring   





electrodes]   


 


Respiratory 20 18 16





Rate   


 


Blood Pressure   109/64





[Right Brachial   





artery]   


 


O2 Saturation   96














  07/17/17





  16:50


 


Temperature 


 


Heart Rate 108 H


 


Heart Rate [ 





Brachial] 


 


Heart Rate [ 





Monitoring 





electrodes] 


 


Respiratory 18





Rate 


 


Blood Pressure 





[Right Brachial 





artery] 


 


O2 Saturation 








 Oxygen











O2 Source [With Activity]      to 93% on 3L O2 w/PLB


 


O2 Source [Without Activity]   to 93% on 3L O2 w/ PLB


 


O2 Source                      Nasal cannula














I&O (Last 24 Hrs): 





 Intake and Output Totals x24h











 07/15/17 07/16/17 07/17/17





 23:59 23:59 23:59


 


Intake Total 1952 2116 1230


 


Output Total 825 1300 1400


 


Balance 1127 816 -170











General: Alert, Oriented x3, Cooperative, No acute distress


HEENT: Atraumatic, PERRLA, EOMI, Mucous membr. moist/pink


Neck: Supple, No JVD, No thyromegaly, +2 carotid pulse wo bruit, No LAD


Lymphatic: no adenopathy


Neuro: Alert, Non Focal, CN 2-12 Grossly Intact, Oriented Times 3


Cardiovascular: Regular rate, Normal S1, Normal S2, No murmurs


Respiratory: Chest non-tender, No respiratory distress, Rales (mild at the bases

)


Abdomen: Normal bowel sounds, Soft, No tenderness, No hepatospenomegaly, No 

masses, Other (distended)


Extremities: No clubbing, No cyanosis, Normal pulses, Other (Bilateral LE edema

, right hip surgical site looks clean and swelling is down. Decreased ROM)


Skin: No rashes, No breakdown





- Results


Results: 





 Laboratory Results











WBC  13.6 x10^3/uL (4.8-10.8)  H  07/17/17  06:05    


 


RBC  3.37 10^6/uL (4.70-6.10)  L  07/17/17  06:05    


 


Hgb  10.6 g/dL (14.0-18.0)  L  07/17/17  06:05    


 


Hct  32.2 % (42.0-52.0)  L  07/17/17  06:05    


 


MCV  95.6 fL (80.0-94.0)  H  07/17/17  06:05    


 


MCH  31.5 pg (27.0-31.0)  H  07/17/17  06:05    


 


MCHC  33.0 g/dL (32.0-36.0)   07/17/17  06:05    


 


RDW  14.5 % (12.0-15.0)   07/17/17  06:05    


 


Plt Count  334 10^3/uL (130-450)   07/17/17  06:05    


 


MPV  7.6 fL (7.4-11.4)   07/17/17  06:05    


 


Neut #  9.0 10^3/uL (1.5-6.6)  H  07/17/17  06:05    


 


Lymph #  2.8 10^3/uL (1.5-3.5)   07/17/17  06:05    


 


Mono #  1.7 10^3/uL (0.0-1.0)  H  07/17/17  06:05    


 


Eos #  0.1 10^3/uL (0.0-0.7)   07/17/17  06:05    


 


Baso #  0.0 10^3/uL (0.0-0.1)   07/17/17  06:05    


 


Absolute Nucleated RBC  0.29 x10^3/uL  07/17/17  06:05    


 


Band Neuts % (Manual)  Not Reportable   07/17/17  06:05    


 


Nucleated RBCs  2.2 /100WBC  07/17/17  06:05    


 


Differential Comment  MANUAL=AUTO DIFF   07/17/17  06:05    


 


Platelet Estimate  NORMAL (130-450,000)  (NORMAL)   07/17/17  06:05    


 


Platelet Morphology  NORMAL APPEARANCE  (NORMAL)   07/17/17  06:05    


 


RBC Morph Micro Appear  1+ STOMATOCYTES  (NORMAL)   07/17/17  06:05    


 


PT  16.4 secs (9.9-12.6)  H  07/17/17  06:05    


 


INR  1.4  (0.8-1.2)  H  07/17/17  06:05    


 


D-Dimer  579.3 ng/mL (200.0-255.0)  H  07/12/17  16:48    


 


Bld Gas Analysis Time  0320   07/13/17  03:00    


 


Sample Site  LEFT RADIAL   07/13/17  03:00    


 


ABG pH  7.27  (7.35-7.45)  L  07/13/17  03:00    


 


ABG pCO2  60 mmHg (34-45)  H*  07/13/17  03:00    


 


ABG pO2  81 mmHg ()   07/13/17  03:00    


 


ABG HCO3  27.3 mmol/L (22.0-26.0)  H  07/13/17  03:00    


 


ABG Total CO2  29.0 MMOL/L (21.0-29.0)   07/13/17  03:00    


 


ABG O2 Saturation  94 % (94-98)   07/13/17  03:00    


 


ABG Oximetry Spot Check  95 %  07/13/17  03:00    


 


ABG Base Excess  0.0 mmol/L (-2.0-3.0)   07/13/17  03:00    


 


Real Test  POSITIVE   07/13/17  03:00    


 


VBG pH  7.396  (7.31-7.41)   07/17/17  06:05    


 


Ionized Calcium  1.11 mmol/L (1.15-1.33)  L  07/17/17  06:05    


 


Respiration Rate  18 b/min  07/13/17  03:00    


 


O2 Delivery Device  BiPAP   07/13/17  03:00    


 


O2 Liters/Min  3.00 LPM  07/12/17  11:50    


 


Vent Mode  SYNCHRONOUS/TIMES   07/13/17  03:00    


 


FiO2  35.00   07/13/17  03:00    


 


Pressure Support Vent  10 cmH2O  07/12/17  17:05    


 


EPAP  5 cmH2O  07/13/17  03:00    


 


IPAP  15 cmH2O  07/13/17  03:00    


 


Sodium  139 mmol/L (135-145)   07/17/17  06:05    


 


Potassium  4.3 mmol/L (3.5-5.0)   07/17/17  06:05    


 


Chloride  101 mmol/L (101-111)   07/17/17  06:05    


 


Carbon Dioxide  31 mmol/L (21-32)   07/17/17  06:05    


 


Anion Gap  7.0  (6-13)   07/17/17  06:05    


 


BUN  26 mg/dL (6-20)  H  07/17/17  06:05    


 


Creatinine  0.7 mg/dL (0.6-1.2)   07/17/17  06:05    


 


Estimated GFR (MDRD)  114  (>89)   07/17/17  06:05    


 


Glucose  82 mg/dL ()   07/17/17  06:05    


 


POC Whole Bld Glucose  79 mg/dL (70 - 100)   07/17/17  07:46    


 


Lactic Acid  1.4 mmol/L (0.5-2.2)   07/11/17  20:38    


 


Calcium  8.2 mg/dL (8.5-10.3)  L  07/17/17  06:05    


 


Ionized Calcium  YES   07/17/17  06:05    


 


Phosphorus  3.0 mg/dL (2.5-4.6)   07/17/17  06:05    


 


Magnesium  1.9 mg/dL (1.7-2.8)   07/17/17  06:05    


 


Total Bilirubin  0.4 mg/dL (0.2-1.0)   07/17/17  06:05    


 


AST  21 IU/L (10-42)   07/17/17  06:05    


 


ALT  29 IU/L (10-60)   07/17/17  06:05    


 


Alkaline Phosphatase  55 IU/L ()   07/17/17  06:05    


 


Troponin I  < 0.04 ng/mL (<0.49)   07/12/17  12:20    


 


B-Natriuretic Peptide  87 pg/mL (5-100)   07/12/17  12:20    


 


Total Protein  6.3 g/dL (6.7-8.2)  L  07/17/17  06:05    


 


Albumin  2.8 g/dL (3.2-5.5)  L  07/17/17  06:05    


 


Globulin  3.5 g/dL (2.1-4.2)   07/17/17  06:05    


 


Albumin/Globulin Ratio  0.8  (1.0-2.2)  L  07/17/17  06:05    


 


Urine Color  YELLOW   07/11/17  22:15    


 


Urine Clarity  CLEAR  (CLEAR)   07/11/17  22:15    


 


Urine pH  5.5 PH (5.0-7.5)   07/11/17  22:15    


 


Ur Specific Gravity  1.025  (1.002-1.030)   07/11/17  22:15    


 


Urine Protein  NEGATIVE mg/dL (NEGATIVE)   07/11/17  22:15    


 


Urine Glucose (UA)  NEGATIVE mg/dL (NEGATIVE)   07/11/17  22:15    


 


Urine Ketones  TRACE mg/dL (NEGATIVE)   07/11/17  22:15    


 


Urine Occult Blood  NEGATIVE  (NEGATIVE)   07/11/17  22:15    


 


Urine Nitrite  NEGATIVE  (NEGATIVE)   07/11/17  22:15    


 


Urine Bilirubin  NEGATIVE  (NEGATIVE)   07/11/17  22:15    


 


Urine Urobilinogen  0.2 (NORMAL) E.U./dL (NORMAL)   07/11/17  22:15    


 


Ur Leukocyte Esterase  NEGATIVE  (NEGATIVE)   07/11/17  22:15    


 


Ur Microscopic Review  NOT INDICATED   07/11/17  22:15    


 


Urine Culture Comments  NOT INDICATED   07/11/17  22:15    


 


Urine Creatinine  144.9 mg/dL  07/12/17  13:35    


 


Urine Sodium  51.0 mmol/L  07/12/17  13:35

## 2017-07-18 LAB
INR PPP: 1.5 (ref 0.8–1.2)
PROTHROM ACT/NOR PPP: 16.8 SECS (ref 9.9–12.6)

## 2017-07-18 RX ADMIN — WARFARIN SODIUM SCH MG: 5 TABLET ORAL at 14:09

## 2017-07-18 RX ADMIN — SODIUM CHLORIDE, PRESERVATIVE FREE SCH: 5 INJECTION INTRAVENOUS at 01:45

## 2017-07-18 RX ADMIN — ACETAMINOPHEN PRN MG: 500 TABLET ORAL at 18:12

## 2017-07-18 RX ADMIN — OXYCODONE PRN MG: 5 TABLET ORAL at 14:08

## 2017-07-18 RX ADMIN — IPRATROPIUM BROMIDE AND ALBUTEROL SULFATE SCH ML: 2.5; .5 SOLUTION RESPIRATORY (INHALATION) at 07:15

## 2017-07-18 RX ADMIN — SODIUM CHLORIDE, PRESERVATIVE FREE SCH ML: 5 INJECTION INTRAVENOUS at 19:58

## 2017-07-18 RX ADMIN — SODIUM PHOSPHATE, DIBASIC, ANHYDROUS, POTASSIUM PHOSPHATE, MONOBASIC, AND SODIUM PHOSPHATE, MONOBASIC, MONOHYDRATE SCH MG: 852; 155; 130 TABLET, COATED ORAL at 14:08

## 2017-07-18 RX ADMIN — DOCUSATE SODIUM SCH: 100 CAPSULE, LIQUID FILLED ORAL at 09:43

## 2017-07-18 RX ADMIN — SODIUM CHLORIDE, PRESERVATIVE FREE SCH ML: 5 INJECTION INTRAVENOUS at 14:08

## 2017-07-18 RX ADMIN — SODIUM CHLORIDE, PRESERVATIVE FREE SCH ML: 5 INJECTION INTRAVENOUS at 05:39

## 2017-07-18 RX ADMIN — ENOXAPARIN SODIUM SCH MG: 150 INJECTION SUBCUTANEOUS at 09:38

## 2017-07-18 RX ADMIN — ACETAMINOPHEN SCH MG: 500 TABLET ORAL at 09:39

## 2017-07-18 RX ADMIN — CHLORHEXIDINE GLUCONATE SCH: 1.2 SOLUTION ORAL at 09:43

## 2017-07-18 RX ADMIN — NIFEDIPINE SCH MG: 30 TABLET, FILM COATED, EXTENDED RELEASE ORAL at 09:39

## 2017-07-18 RX ADMIN — SODIUM CHLORIDE SCH MG: 9 INJECTION, SOLUTION INTRAVENOUS at 05:38

## 2017-07-18 RX ADMIN — ENOXAPARIN SODIUM SCH MG: 150 INJECTION SUBCUTANEOUS at 20:00

## 2017-07-18 RX ADMIN — IPRATROPIUM BROMIDE AND ALBUTEROL SULFATE SCH: 2.5; .5 SOLUTION RESPIRATORY (INHALATION) at 20:19

## 2017-07-18 RX ADMIN — ACETAMINOPHEN SCH MG: 500 TABLET ORAL at 14:08

## 2017-07-18 RX ADMIN — OXYCODONE PRN MG: 5 TABLET ORAL at 00:32

## 2017-07-18 RX ADMIN — DOCUSATE SODIUM SCH: 100 CAPSULE, LIQUID FILLED ORAL at 19:58

## 2017-07-18 RX ADMIN — SODIUM PHOSPHATE, DIBASIC, ANHYDROUS, POTASSIUM PHOSPHATE, MONOBASIC, AND SODIUM PHOSPHATE, MONOBASIC, MONOHYDRATE SCH MG: 852; 155; 130 TABLET, COATED ORAL at 09:38

## 2017-07-18 RX ADMIN — OXYCODONE PRN MG: 5 TABLET ORAL at 05:38

## 2017-07-18 RX ADMIN — OXYCODONE PRN MG: 5 TABLET ORAL at 09:39

## 2017-07-18 RX ADMIN — OXYCODONE PRN MG: 5 TABLET ORAL at 19:51

## 2017-07-18 RX ADMIN — IPRATROPIUM BROMIDE AND ALBUTEROL SULFATE SCH: 2.5; .5 SOLUTION RESPIRATORY (INHALATION) at 20:20

## 2017-07-18 RX ADMIN — IPRATROPIUM BROMIDE AND ALBUTEROL SULFATE SCH ML: 2.5; .5 SOLUTION RESPIRATORY (INHALATION) at 17:00

## 2017-07-18 NOTE — PROVIDER PROGRESS NOTE
Assessment/Plan





- Current Meds


Current Meds: 





 Current Medications











Generic Name Dose Route Start Last Admin





  Trade Name Freq  PRN Reason Stop Dose Admin


 


Albuterol/Ipratropium  3 ml 07/10/17 19:00 07/18/17 17:00





  Duoneb  INH   3 ml





  RTQID SAM   Administration


 


Diphenhydramine HCl  25 mg 07/10/17 17:00 07/13/17 23:39





  Benadryl  PO   25 mg





  Q6H PRN   Administration





  ITCHING   


 


Docusate Sodium  100 mg 07/11/17 10:00 07/18/17 09:43





  Colace 100mg Capsule  PO   Not Given





  BID SAM   


 


Enoxaparin Sodium  125 mg 07/13/17 15:00 07/18/17 09:38





  Lovenox  SUBQ   125 mg





  BID SAM   Administration


 


Hydromorphone HCl  0.5 mg 07/13/17 16:51 07/15/17 23:37





  Dilaudid Inj  IVP   0.5 mg





  Q2HR PRN   Administration





  PAIN   


 


Lorazepam  0.5 mg 07/12/17 15:21 07/13/17 21:16





  Ativan Inj  IVP   0.5 mg





  Q2HR PRN   Administration





  Anxiety   


 


Nifedipine  30 mg 07/15/17 09:00 07/18/17 09:39





  Procardia Xl  PO   30 mg





  DAILY SAM   Administration


 


Non-Formulary Medication  1 puffs 07/11/17 10:00 07/18/17 07:15





  Fluticasone/Vilanterol [Breo Ellipta 100-25 Mcg Inh]  INH   1 puffs





  RTDAILY SAM   Administration


 


Oxycodone HCl  5 - 10 mg 07/11/17 13:30 07/18/17 14:08





  Roxicodone  PO   10 mg





  Q4HR PRN   Administration





  PAIN   


 


Pantoprazole Sodium  40 mg 07/13/17 07:00 07/18/17 05:38





  Protonix  IVP   40 mg





  QDAC SAM   Administration


 


Prednisone  40 mg 07/16/17 08:00 07/18/17 09:39





  Deltasone  PO   40 mg





  DAILYWM SAM   Administration


 


Sodium Chloride  10 ml 07/10/17 17:00 07/15/17 23:38





  Normal Saline Flush 0.9%  IVP   10 ml





  PRN PRN   Administration





  AS NEEDED PER PROVIDER ORDERS   


 


Sodium Chloride  10 ml 07/10/17 22:00 07/18/17 14:08





  Normal Saline Flush 0.9%  IVP   10 ml





  Q8HR SAM   Administration


 


Warfarin Sodium  5 mg 07/13/17 15:00 07/18/17 14:09





  Coumadin  PO   5 mg





  QDWARFARIN SAM   Administration














- Lab Result


Fish Bone Diagrams: 


 07/17/17 06:05





 07/17/17 06:05





Objective


Vital Signs: 





 Vital Signs - 24 hr











  07/17/17 07/18/17 07/18/17





  20:54 00:14 07:15


 


Temperature  36.5 C 


 


Heart Rate 105 H  90


 


Heart Rate [  83 





Brachial]   


 


Respiratory 16 16 16





Rate   


 


Blood Pressure  143/83 H 





[Right Brachial   





artery]   


 


O2 Saturation  98 














  07/18/17 07/18/17





  09:03 17:00


 


Temperature 36.9 C 


 


Heart Rate  99


 


Heart Rate [ 110 H 





Brachial]  


 


Respiratory 19 18





Rate  


 


Blood Pressure 144/99 H 





[Right Brachial  





artery]  


 


O2 Saturation 93 








 Oxygen











O2 Source [With Activity]      to 93% on 3L O2 w/PLB


 


O2 Source [Without Activity]   to 93% on 3L O2 w/ PLB


 


O2 Source                      Nasal cannula














I&O (Last 24 Hrs): 





 Intake and Output Totals x24h











 07/16/17 07/17/17 07/18/17





 23:59 23:59 23:59


 


Intake Total 2116 1910 800


 


Output Total 1300 1400 1275


 


Balance 816 510 -475














- Results


Results: 





 Laboratory Results











WBC  13.6 x10^3/uL (4.8-10.8)  H  07/17/17  06:05    


 


RBC  3.37 10^6/uL (4.70-6.10)  L  07/17/17  06:05    


 


Hgb  10.6 g/dL (14.0-18.0)  L  07/17/17  06:05    


 


Hct  32.2 % (42.0-52.0)  L  07/17/17  06:05    


 


MCV  95.6 fL (80.0-94.0)  H  07/17/17  06:05    


 


MCH  31.5 pg (27.0-31.0)  H  07/17/17  06:05    


 


MCHC  33.0 g/dL (32.0-36.0)   07/17/17  06:05    


 


RDW  14.5 % (12.0-15.0)   07/17/17  06:05    


 


Plt Count  334 10^3/uL (130-450)   07/17/17  06:05    


 


MPV  7.6 fL (7.4-11.4)   07/17/17  06:05    


 


Neut #  9.0 10^3/uL (1.5-6.6)  H  07/17/17  06:05    


 


Lymph #  2.8 10^3/uL (1.5-3.5)   07/17/17  06:05    


 


Mono #  1.7 10^3/uL (0.0-1.0)  H  07/17/17  06:05    


 


Eos #  0.1 10^3/uL (0.0-0.7)   07/17/17  06:05    


 


Baso #  0.0 10^3/uL (0.0-0.1)   07/17/17  06:05    


 


Absolute Nucleated RBC  0.29 x10^3/uL  07/17/17  06:05    


 


Band Neuts % (Manual)  Not Reportable   07/17/17  06:05    


 


Nucleated RBCs  2.2 /100WBC  07/17/17  06:05    


 


Differential Comment  MANUAL=AUTO DIFF   07/17/17  06:05    


 


Platelet Estimate  NORMAL (130-450,000)  (NORMAL)   07/17/17  06:05    


 


Platelet Morphology  NORMAL APPEARANCE  (NORMAL)   07/17/17  06:05    


 


RBC Morph Micro Appear  1+ STOMATOCYTES  (NORMAL)   07/17/17  06:05    


 


PT  16.8 secs (9.9-12.6)  H  07/18/17  05:25    


 


INR  1.5  (0.8-1.2)  H  07/18/17  05:25    


 


D-Dimer  579.3 ng/mL (200.0-255.0)  H  07/12/17  16:48    


 


Bld Gas Analysis Time  0320   07/13/17  03:00    


 


Sample Site  LEFT RADIAL   07/13/17  03:00    


 


ABG pH  7.27  (7.35-7.45)  L  07/13/17  03:00    


 


ABG pCO2  60 mmHg (34-45)  H*  07/13/17  03:00    


 


ABG pO2  81 mmHg ()   07/13/17  03:00    


 


ABG HCO3  27.3 mmol/L (22.0-26.0)  H  07/13/17  03:00    


 


ABG Total CO2  29.0 MMOL/L (21.0-29.0)   07/13/17  03:00    


 


ABG O2 Saturation  94 % (94-98)   07/13/17  03:00    


 


ABG Oximetry Spot Check  95 %  07/13/17  03:00    


 


ABG Base Excess  0.0 mmol/L (-2.0-3.0)   07/13/17  03:00    


 


Real Test  POSITIVE   07/13/17  03:00    


 


VBG pH  7.396  (7.31-7.41)   07/17/17  06:05    


 


Ionized Calcium  1.11 mmol/L (1.15-1.33)  L  07/17/17  06:05    


 


Respiration Rate  18 b/min  07/13/17  03:00    


 


O2 Delivery Device  BiPAP   07/13/17  03:00    


 


O2 Liters/Min  3.00 LPM  07/12/17  11:50    


 


Vent Mode  SYNCHRONOUS/TIMES   07/13/17  03:00    


 


FiO2  35.00   07/13/17  03:00    


 


Pressure Support Vent  10 cmH2O  07/12/17  17:05    


 


EPAP  5 cmH2O  07/13/17  03:00    


 


IPAP  15 cmH2O  07/13/17  03:00    


 


Sodium  139 mmol/L (135-145)   07/17/17  06:05    


 


Potassium  4.3 mmol/L (3.5-5.0)   07/17/17  06:05    


 


Chloride  101 mmol/L (101-111)   07/17/17  06:05    


 


Carbon Dioxide  31 mmol/L (21-32)   07/17/17  06:05    


 


Anion Gap  7.0  (6-13)   07/17/17  06:05    


 


BUN  26 mg/dL (6-20)  H  07/17/17  06:05    


 


Creatinine  0.7 mg/dL (0.6-1.2)   07/17/17  06:05    


 


Estimated GFR (MDRD)  114  (>89)   07/17/17  06:05    


 


Glucose  82 mg/dL ()   07/17/17  06:05    


 


POC Whole Bld Glucose  79 mg/dL (70 - 100)   07/17/17  07:46    


 


Lactic Acid  1.4 mmol/L (0.5-2.2)   07/11/17  20:38    


 


Calcium  8.2 mg/dL (8.5-10.3)  L  07/17/17  06:05    


 


Ionized Calcium  YES   07/17/17  06:05    


 


Phosphorus  3.0 mg/dL (2.5-4.6)   07/17/17  06:05    


 


Magnesium  1.9 mg/dL (1.7-2.8)   07/17/17  06:05    


 


Total Bilirubin  0.4 mg/dL (0.2-1.0)   07/17/17  06:05    


 


AST  21 IU/L (10-42)   07/17/17  06:05    


 


ALT  29 IU/L (10-60)   07/17/17  06:05    


 


Alkaline Phosphatase  55 IU/L ()   07/17/17  06:05    


 


Troponin I  < 0.04 ng/mL (<0.49)   07/12/17  12:20    


 


B-Natriuretic Peptide  87 pg/mL (5-100)   07/12/17  12:20    


 


Total Protein  6.3 g/dL (6.7-8.2)  L  07/17/17  06:05    


 


Albumin  2.8 g/dL (3.2-5.5)  L  07/17/17  06:05    


 


Globulin  3.5 g/dL (2.1-4.2)   07/17/17  06:05    


 


Albumin/Globulin Ratio  0.8  (1.0-2.2)  L  07/17/17  06:05    


 


Urine Color  YELLOW   07/11/17  22:15    


 


Urine Clarity  CLEAR  (CLEAR)   07/11/17  22:15    


 


Urine pH  5.5 PH (5.0-7.5)   07/11/17  22:15    


 


Ur Specific Gravity  1.025  (1.002-1.030)   07/11/17  22:15    


 


Urine Protein  NEGATIVE mg/dL (NEGATIVE)   07/11/17  22:15    


 


Urine Glucose (UA)  NEGATIVE mg/dL (NEGATIVE)   07/11/17  22:15    


 


Urine Ketones  TRACE mg/dL (NEGATIVE)   07/11/17  22:15    


 


Urine Occult Blood  NEGATIVE  (NEGATIVE)   07/11/17  22:15    


 


Urine Nitrite  NEGATIVE  (NEGATIVE)   07/11/17  22:15    


 


Urine Bilirubin  NEGATIVE  (NEGATIVE)   07/11/17  22:15    


 


Urine Urobilinogen  0.2 (NORMAL) E.U./dL (NORMAL)   07/11/17  22:15    


 


Ur Leukocyte Esterase  NEGATIVE  (NEGATIVE)   07/11/17  22:15    


 


Ur Microscopic Review  NOT INDICATED   07/11/17  22:15    


 


Urine Culture Comments  NOT INDICATED   07/11/17  22:15    


 


Urine Creatinine  144.9 mg/dL  07/12/17  13:35    


 


Urine Sodium  51.0 mmol/L  07/12/17  13:35

## 2017-07-19 VITALS — DIASTOLIC BLOOD PRESSURE: 84 MMHG | SYSTOLIC BLOOD PRESSURE: 150 MMHG

## 2017-07-19 RX ADMIN — IPRATROPIUM BROMIDE AND ALBUTEROL SULFATE SCH ML: 2.5; .5 SOLUTION RESPIRATORY (INHALATION) at 06:34

## 2017-07-19 RX ADMIN — ACETAMINOPHEN PRN MG: 500 TABLET ORAL at 11:22

## 2017-07-19 RX ADMIN — IPRATROPIUM BROMIDE AND ALBUTEROL SULFATE SCH: 2.5; .5 SOLUTION RESPIRATORY (INHALATION) at 11:34

## 2017-07-19 RX ADMIN — SODIUM CHLORIDE, PRESERVATIVE FREE SCH: 5 INJECTION INTRAVENOUS at 14:57

## 2017-07-19 RX ADMIN — SODIUM CHLORIDE SCH MG: 9 INJECTION, SOLUTION INTRAVENOUS at 06:12

## 2017-07-19 RX ADMIN — WARFARIN SODIUM SCH MG: 5 TABLET ORAL at 14:15

## 2017-07-19 RX ADMIN — NIFEDIPINE SCH MG: 30 TABLET, FILM COATED, EXTENDED RELEASE ORAL at 08:50

## 2017-07-19 RX ADMIN — SODIUM CHLORIDE, PRESERVATIVE FREE SCH ML: 5 INJECTION INTRAVENOUS at 06:21

## 2017-07-19 RX ADMIN — OXYCODONE PRN MG: 5 TABLET ORAL at 11:22

## 2017-07-19 RX ADMIN — OXYCODONE PRN MG: 5 TABLET ORAL at 00:32

## 2017-07-19 RX ADMIN — ENOXAPARIN SODIUM SCH MG: 150 INJECTION SUBCUTANEOUS at 08:51

## 2017-07-19 RX ADMIN — OXYCODONE PRN MG: 5 TABLET ORAL at 14:06

## 2017-07-19 RX ADMIN — OXYCODONE PRN MG: 5 TABLET ORAL at 06:12

## 2017-07-19 RX ADMIN — DOCUSATE SODIUM SCH: 100 CAPSULE, LIQUID FILLED ORAL at 08:52

## 2017-07-19 NOTE — DISCHARGE PLAN
Discharge Plan


Disposition: 01 Home, Self Care


Condition: Good


Diet: Regular


Activity Restrictions: Wt Bearing as Tolerated


Shower Restrictions: No


Driving Restrictions: Yes (must be cleared by ortho)


No Smoking: If you smoke, Please STOP!  Call [phone] for help.


Follow-up with: 


Donna Ny PA-C [Primary Care Provider] - 2 Weeks


Sandeep Almeida MD [Provider Admit Priv/Credential] - 2 Weeks

## 2017-07-20 NOTE — DISCHARGE SUMMARY
DATE OF ADMISSION:  07/10/2017

 

DATE OF DISCHARGE:  07/19/2017

 

PRIMARY CARE PHYSICIAN: VALERIE Kramer.

 

ORTHOPEDICS: Sandeep Almeida MD. 

 

ADMISSION DIAGNOSES

1. Severe osteoarthritis, right hip, status post right replacement.

2. Chronic obstructive pulmonary disease without exacerbation.

3. Hypertension, on medication.

 

DISCHARGE DIAGNOSES

1. Bilateral pulmonary emboli.

2. Respiratory failure, acute, secondary to #1.

3. Chronic obstructive pulmonary disease with exacerbation.

4. Right hip osteoarthritis, status post right hip replacement.

5. Hypertension.

6. Chronic back pain.

7. Tobacco abuse, longstanding, status post cessation 3 months prior to admission. 

 

SPECIAL PROCEDURES

Echocardiogram, interpretation:

1. Left ventricular systolic function is hyperdynamic with an ejection fraction of greater than 75%.

2. Severe increasing left atrial volume index.

3. Mild right atrial enlargement.

4. Mild mitral regurgitation.

5. Poor image quality. 

 

CTA of the chest, impression: Bilateral pulmonary emboli.

 

Ultrasound: No significant renal abnormality. No evidence of hydronephrosis. 

 

CONSULTATIONS: Dr. Sandeep Almeida, who was actually the admitting physician. 

 

PROCEDURES DONE: Right hip replacement. 

 

HOSPITAL COURSE AND MANAGEMENT: The patient was admitted to the hospital electively for the right hip
 severe osteoarthritis and had the hip replacement. He had a medical consultation because of the COPD
. In the next few days he had development of severe dyspnea and was found to have bilateral pulmonary
 emboli, also had an exacerbation of COPD and respiratory failure as a result of PE plus COPD exacerb
ation. The patient then slowly improved over the next several days and had physical therapy, improved
 modestly and was evaluated for his ability to be able to complete his ADLs without assistance. He wa
s found to need skilled nursing facility with physical therapy and other assistance. He was then prep
ared for transfer to skilled nursing facility, specifically [facility]. 

 

PHYSICAL EXAMINATION ON DAY OF DISCHARGE

VITAL SIGNS: 36.8, 105, 150/84, 18, 95 room air sat.

EYES: EOMI within normal limits, PERRL, nonicteric.

MOUTH AND THROAT: Moist mucous membranes. No other pathology noted.

NECK: No lymphadenopathy. No thyromegaly.

CHEST WALL: Nontender, symmetric.

HEART: Normal sinus, tachycardia. No murmurs, rubs, clicks.

LUNGS: Clear, good air movement bilaterally.

ABDOMEN: Thick abdominal wall, soft, nontender.

EXTREMITIES: The patient has bilateral leg swelling, 2+ mid calves. The patient's legs are nontender.


 

LABORATORY DATA: The patient's INR is 1.5 on the day of discharge. The patient's white count is 13.6,
 10 and 32 hemoglobin and hematocrit. The patient's sodium 139, potassium 4.3, chloride 103, CO2 31, 
BUN 26, creatinine 0.7, glucose 82. The patient's calcium is 8.2, which is low. His albumin is 2.8. 

 

DISCHARGE: He is discharged to [facility]. 

 

DISCHARGE MEDICATIONS

1. Norco 10/325 one q.4 hours p.r.n. pain.

2. Neurontin 300 mg b.i.d.

3. Vistaril 50 mg p.r.n. anxiety.

4. BREO Ellipta 100/25 mcg, his own medication to be used, 1 puff daily.

5. Albuterol inhaler 1-2 puffs every 4 hours as needed.

6. Lisinopril 40 mg daily.

7. Ibuprofen 800 mg q.8 hours p.r.n.

8. The patient also to be on enoxaparin 125 mg b.i.d. until his INR is greater than 2.0.

9. Warfarin 7.5 mg daily, to be adjusted as needed at the skilled nursing facility.

 

OUTPATIENT ISSUES: Reevaluation in SNF in 2 weeks by Orthopedics. Followup by the medical director or
 his primary care for his warfarin dosing with PT evaluation and supervision of his rehab for his hip
 replacement. 

 

TIME SPENT IN EVALUATION: 45 minutes including collaboration with case management, orthopedics, and t
he patient's nursing staff. The patient was examined on the day of discharge as noted above.

 

 

 

DD:07/19/2017 16:14:00  DT: 07/20/2017 04:20  JOB #: [id number]  EXT JOB #:[id number]

## 2017-08-08 NOTE — OPERATIVE REPORT
DATE OF SURGERY:  07/10/2012 00:00:00

 

PREOPERATIVE DIAGNOSIS:  Right hip osteoarthritis. 

 

POSTOPERATIVE DIAGNOSIS:  Right hip osteoarthrosis. 

 

PROCEDURE:  Right total hip arthroplasty via anterolateral approach. 

 

SURGEON:  Sandeep Almeida MD

 

ANESTHESIA PROVIDER:  Saige Damon MD 

 

ANESTHESIA:  Local plus moderate sedation plus spinal. 

 

PATHOLOGY:  Same. 

 

ESTIMATED BLOOD LOSS:  300 mL. 

 

COMPLICATIONS:  None.  

 

IMPLANTS 

1. Claudia Taperloc femoral stem, standard offset, porous coated, size 12 x 142 mm.  

2. A 58 mm porous coated acetabular shell.

3. Ultra high molecular weight polyethylene acetabular cup, 40 mm internal diameter. 

4. Screws - 6.5 x 25 and 6.5 x 20 mm.

5. Chrome cobalt femoral head plus 3 x 40 mm outer diameter. 

 

FLUIDS:  1750 mL of Lactated Ringer's.  

 

URINE:  250 mL. 

 

CONDITION AT END OF PROCEDURE:  Stable. 

 

DISPOSITION:  PACU, then Med/Surg.

 

INDICATIONS:  This is a moderately obese 62-year-old male with a longstanding progressive history of 
pain in his right hip, with radiographs showing severe osteoarthritis.  

 

Despite conservative measures including a cane to the left hand, physical therapy, and exercise, medi
jami treatment with nonsteroidal antiinflammatory medications and acetaminophen, activity modification
, the patient had failed to have any significant relief of his symptoms, which have progressed now to
 the point of interfering with sleep at night.  

 

After prolapsed discussion, he has decided to proceed with right total hip arthroplasty. 

 

PROCEDURE IN DETAIL:  After consent and identification, the patient was brought to the operating room
 and placed in a supine position on the operating table.  With the patient in a seated position, a sp
inal anesthetic was established.  The patient was then placed in a right lateral decubitus position o
n the pegboard with a padded axillary roll underneath the left axilla, padding underneath the left pe
roneal nerve, and padded close at the pubic symphysis the ischium, the sacrum, and the chest.  The [facility] lower extremity was then prepped and draped free in the usual sterile fashion for hip surgery. 

 

After an appropriate timeout was conducted, the hip was flexed to 45 degrees, and a longitudinal inci
elias paralleling the lateral border of the femur was drawn out, extending 10 cm above the tip of the 
greater trochanter and approximately 20 cm distal to the tip of the greater trochanter.  Skin and sub
cutaneous tissue were then divided using a #10 blade scalpel.  The #10 blade scalpel was used to make
 a small rent in the mid portion of the iliotibial band at the junction with the fascia over the tens
or fascia alyssa.  Peritoneal scissors was then used to continue the incision longitudinally along the 
iliotibial band and then superiorly along the fascia overlying the extensor fascia alyssa up into the e
xternal fascia and the muscle of the gluteus jace.  A Charnley with long blades was then inserted 
to expose the trochanteric bursa.  The trochanteric bursa was carefully resected.  We identified the 
anterior border of the gluteus medius muscle and used the electrocautery to elevate a digastric flap 
of the anterior one third of the gluteus medius and the anterior portion of the vastus lateralis musc
le.  This was done subperiosteally over the greater trochanter.  We developed the interval between th
e gluteus and the vastus lateralis and the hip capsule.  We then resected the anterior and superior p
ortion of the gluteus minimus muscle, exposing the hip capsule. Curved Cobra retractors were placed s
uperior and inferior to the capsule along the femoral neck.  

 

We then made an H-shaped capsulotomy with a #10 blade scalpel and completed it with electrocautery to
 expose the neck of the femur.  The curved Cobra retractors were placed superiorly and inferiorly to 
the femoral neck.  With longitudinal  traction and external rotation, along with a skid, we were able
 to dislocate the femoral head.  With the leg in external rotation, we identified our landmarks along
 the neck and mapped out a cut on the femoral neck 1 cm proximal to the lesser trochanter along the c
alcar up to the boundary between the superior femoral neck and the greater trochanter.  A reciprocati
ng saw was used to make the femoral neck cut.  A 1-inch osteotome was used to complete the superior p
ortion of the cut along the femoral neck.  The femoral head and neck were then removed from the joint
.  We used a rongeur to remove soft tissue from the cotyloid in the acetabulum.  

 

We placed a Hohmann retractor superior to the acetabulum, a bent Hohmann retractor anteriorly, and an
other bent Hohmann posteriorly to expose the acetabulum, which was then sequentially reamed starting 
with a 47 mm reamer up to a 57 mm diameter reamer.  We inserted a 57 mm trial, anticipating the use o
f a 58 mm porous coated cup.  The trial fit well.  With the hip in external rotation, and the lower l
eg perpendicular to the floor, we used a  to lateralize along the greater trochanter and a 
canal finder to locate the femoral canal.  The lateralizing reamer was then used to complete our late
ralization.   We then sequentially broached starting with a size 4 broach, broaching up to a size #12
 broach, which gave us a good rotational stability and good interference fit.  With the broach stem l
eft in place, we trialed with a standard offset femoral neck at zero length with a 40 mm outer diamet
er head.  

 

We first impacted the 58 mm porous coated acetabular shell into position with approximately 45 degree
s of coverage and 15 degrees of anteversion.  The acetabular cup with a 40 mm inner diameter was then
 impacted into the acetabular shell.  The trial femoral head and neck on the stem were then reduced i
nto the acetabular cup.  Ranging the hip gave good range of motion and good stability.  Shuck test wa
s negative.  We then removed the broach and trial head and neck.  

 

We selected and then inserted the size 12 x 148 mm femoral stem with standard offset and impacted it 
into position.  Good rotational stability was noted.  We then impacted the +3 x 40 mm outer diameter 
femoral head onto the trunnion.  We reduced the components and again noted good stability and good ro
tational control.  Shuck test was again negative.  

 

We then thoroughly irrigated the hip joint.  We injected the hip with our standard 60 mL cocktail of 
bupivacaine, Duramorph, Toradol.  We then sequentially closed the hip by approximating the capsule an
teriorly with a running interlocked #5 Ethilon suture.  The anterior portion of the gluteus medius an
d the anterior portion of the vastus lateralis in the digastric sleeve was then reduced through drill
 holes onto the greater trochanter and secured with horizontal mattress #2 FiberWire sutures through 
the drill holes and the digastric sleeve.  This was then oversewn with the #2 FiberWire.  The iliotib
ial band was closed with a #1 Vicryl running interlocked suture.  Interrupted 2-0 Vicryl sutures were
 then applied to the subcuticular layer.  The dermis was closed with a running 3-0 Monocryl suture.  
We injected 30 mL of 0.5% Marcaine with epinephrine into the incision site.  The incision was then dr
essed with a Mepilex silver dressing.  

 

On completion of the procedure, the patient was extubated and transferred to the recovery room in [facility], having tolerated the procedure well.  

 

 

 

DD:08/07/2017 16:11:00  DT: 08/07/2017 22:20  JOB #: [id number]  EXT JOB #:[id number]

## 2017-08-17 ENCOUNTER — HOSPITAL ENCOUNTER (INPATIENT)
Dept: HOSPITAL 76 - ED | Age: 62
LOS: 1 days | Discharge: ANOTHER HOSPITAL | DRG: 963 | End: 2017-08-18
Attending: INTERNAL MEDICINE | Admitting: INTERNAL MEDICINE
Payer: MEDICAID

## 2017-08-17 ENCOUNTER — HOSPITAL ENCOUNTER (OUTPATIENT)
Dept: HOSPITAL 76 - EMS | Age: 62
Discharge: ANOTHER HOSPITAL | End: 2017-08-17
Attending: SURGERY
Payer: MEDICAID

## 2017-08-17 DIAGNOSIS — F17.210: ICD-10-CM

## 2017-08-17 DIAGNOSIS — E86.0: ICD-10-CM

## 2017-08-17 DIAGNOSIS — J96.01: ICD-10-CM

## 2017-08-17 DIAGNOSIS — R57.9: ICD-10-CM

## 2017-08-17 DIAGNOSIS — Z79.899: ICD-10-CM

## 2017-08-17 DIAGNOSIS — Z91.81: ICD-10-CM

## 2017-08-17 DIAGNOSIS — Z87.898: ICD-10-CM

## 2017-08-17 DIAGNOSIS — N17.9: ICD-10-CM

## 2017-08-17 DIAGNOSIS — E87.5: ICD-10-CM

## 2017-08-17 DIAGNOSIS — E66.9: ICD-10-CM
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
## 2017-08-17 DIAGNOSIS — F41.9: ICD-10-CM

## 2017-08-17 DIAGNOSIS — J44.9: ICD-10-CM

## 2017-08-17 DIAGNOSIS — M97.01XA: ICD-10-CM

## 2017-08-17 DIAGNOSIS — M19.90: ICD-10-CM

## 2017-08-17 DIAGNOSIS — D64.9: ICD-10-CM

## 2017-08-17 DIAGNOSIS — S72.301A: Primary | ICD-10-CM
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
## 2017-08-17 DIAGNOSIS — J96.02: ICD-10-CM

## 2017-08-17 DIAGNOSIS — M25.551: Primary | ICD-10-CM

## 2017-08-17 DIAGNOSIS — Z79.51: ICD-10-CM

## 2017-08-17 DIAGNOSIS — T79.6XXA: ICD-10-CM

## 2017-08-17 DIAGNOSIS — Z79.01: ICD-10-CM

## 2017-08-17 DIAGNOSIS — Z86.711: ICD-10-CM

## 2017-08-17 DIAGNOSIS — E83.51: ICD-10-CM

## 2017-08-17 DIAGNOSIS — I10: ICD-10-CM

## 2017-08-17 LAB
ALBUMIN/GLOB SERPL: 0.9 {RATIO} (ref 1–2.2)
ANION GAP SERPL CALCULATED.4IONS-SCNC: 13 MMOL/L (ref 6–13)
BASOPHILS NFR BLD AUTO: 0.1 10^3/UL (ref 0–0.1)
BASOPHILS NFR BLD AUTO: 0.8 %
BILIRUB BLD-MCNC: 0.3 MG/DL (ref 0.2–1)
BUN SERPL-MCNC: 61 MG/DL (ref 6–20)
CALCIUM UR-MCNC: 6.8 MG/DL (ref 8.5–10.3)
CHLORIDE SERPL-SCNC: 96 MMOL/L (ref 101–111)
CO2 SERPL-SCNC: 28 MMOL/L (ref 21–32)
CREAT SERPLBLD-SCNC: 4.3 MG/DL (ref 0.6–1.2)
EOSINOPHIL # BLD AUTO: 0.1 10^3/UL (ref 0–0.7)
EOSINOPHIL NFR BLD AUTO: 0.8 %
ERYTHROCYTE [DISTWIDTH] IN BLOOD BY AUTOMATED COUNT: 16.4 % (ref 12–15)
GFRSERPLBLD MDRD-ARVRAT: 14 ML/MIN/{1.73_M2} (ref 89–?)
GLOBULIN SER-MCNC: 3.4 G/DL (ref 2.1–4.2)
GLUCOSE SERPL-MCNC: 109 MG/DL (ref 70–100)
HCT VFR BLD AUTO: 32.4 % (ref 42–52)
HGB UR QL STRIP: 10.2 G/DL (ref 14–18)
INR PPP: 1.5 (ref 0.8–1.2)
LIPASE SERPL-CCNC: 18 U/L (ref 22–51)
LYMPHOCYTES # SPEC AUTO: 1.2 10^3/UL (ref 1.5–3.5)
LYMPHOCYTES NFR BLD AUTO: 11 %
MCH RBC QN AUTO: 31.6 PG (ref 27–31)
MCHC RBC AUTO-ENTMCNC: 31.6 G/DL (ref 32–36)
MCV RBC AUTO: 99.9 FL (ref 80–94)
MONOCYTES # BLD AUTO: 1.2 10^3/UL (ref 0–1)
MONOCYTES NFR BLD AUTO: 10.3 %
NEUTROPHILS # BLD AUTO: 8.7 10^3/UL (ref 1.5–6.6)
NEUTROPHILS # SNV AUTO: 11.3 X10^3/UL (ref 4.8–10.8)
NEUTROPHILS NFR BLD AUTO: 77.1 %
NRBC # BLD AUTO: 0.5 /100WBC
PDW BLD AUTO: 7 FL (ref 7.4–11.4)
POTASSIUM SERPL-SCNC: 4.5 MMOL/L (ref 3.5–5)
PROT SPEC-MCNC: 6.6 G/DL (ref 6.7–8.2)
PROTHROM ACT/NOR PPP: 16.3 SECS (ref 9.9–12.6)
RBC MAR: 3.24 10^6/UL (ref 4.7–6.1)
SODIUM SERPLBLD-SCNC: 137 MMOL/L (ref 135–145)
WBC # BLD: 11.3 X10^3/UL

## 2017-08-17 PROCEDURE — 82310 ASSAY OF CALCIUM: CPT

## 2017-08-17 PROCEDURE — 80048 BASIC METABOLIC PNL TOTAL CA: CPT

## 2017-08-17 PROCEDURE — 36415 COLL VENOUS BLD VENIPUNCTURE: CPT

## 2017-08-17 PROCEDURE — 83735 ASSAY OF MAGNESIUM: CPT

## 2017-08-17 PROCEDURE — 96374 THER/PROPH/DIAG INJ IV PUSH: CPT

## 2017-08-17 PROCEDURE — 36600 WITHDRAWAL OF ARTERIAL BLOOD: CPT

## 2017-08-17 PROCEDURE — 96361 HYDRATE IV INFUSION ADD-ON: CPT

## 2017-08-17 PROCEDURE — 85025 COMPLETE CBC W/AUTO DIFF WBC: CPT

## 2017-08-17 PROCEDURE — 81001 URINALYSIS AUTO W/SCOPE: CPT

## 2017-08-17 PROCEDURE — 94770: CPT

## 2017-08-17 PROCEDURE — 87150 DNA/RNA AMPLIFIED PROBE: CPT

## 2017-08-17 PROCEDURE — 99285 EMERGENCY DEPT VISIT HI MDM: CPT

## 2017-08-17 PROCEDURE — 93306 TTE W/DOPPLER COMPLETE: CPT

## 2017-08-17 PROCEDURE — 84100 ASSAY OF PHOSPHORUS: CPT

## 2017-08-17 PROCEDURE — 85610 PROTHROMBIN TIME: CPT

## 2017-08-17 PROCEDURE — 85520 HEPARIN ASSAY: CPT

## 2017-08-17 PROCEDURE — 99284 EMERGENCY DEPT VISIT MOD MDM: CPT

## 2017-08-17 PROCEDURE — 82550 ASSAY OF CK (CPK): CPT

## 2017-08-17 PROCEDURE — 83690 ASSAY OF LIPASE: CPT

## 2017-08-17 PROCEDURE — 94002 VENT MGMT INPAT INIT DAY: CPT

## 2017-08-17 PROCEDURE — 82330 ASSAY OF CALCIUM: CPT

## 2017-08-17 PROCEDURE — 83605 ASSAY OF LACTIC ACID: CPT

## 2017-08-17 PROCEDURE — 82803 BLOOD GASES ANY COMBINATION: CPT

## 2017-08-17 PROCEDURE — 93005 ELECTROCARDIOGRAM TRACING: CPT

## 2017-08-17 PROCEDURE — 80053 COMPREHEN METABOLIC PANEL: CPT

## 2017-08-17 PROCEDURE — 81003 URINALYSIS AUTO W/O SCOPE: CPT

## 2017-08-17 PROCEDURE — 96372 THER/PROPH/DIAG INJ SC/IM: CPT

## 2017-08-17 PROCEDURE — 84484 ASSAY OF TROPONIN QUANT: CPT

## 2017-08-17 PROCEDURE — 71010: CPT

## 2017-08-17 PROCEDURE — 87086 URINE CULTURE/COLONY COUNT: CPT

## 2017-08-17 PROCEDURE — 83880 ASSAY OF NATRIURETIC PEPTIDE: CPT

## 2017-08-17 RX ADMIN — DEXTROSE AND SODIUM CHLORIDE SCH MLS/HR: 5; 900 INJECTION, SOLUTION INTRAVENOUS at 22:37

## 2017-08-17 RX ADMIN — SODIUM CHLORIDE SCH MG: 9 INJECTION, SOLUTION INTRAVENOUS at 18:47

## 2017-08-17 RX ADMIN — HEPARIN SODIUM SCH UNIT: 5000 INJECTION, SOLUTION INTRAVENOUS; SUBCUTANEOUS at 18:46

## 2017-08-17 NOTE — ED PHYSICIAN DOCUMENTATION
PD HPI LOWER EXT INJURY





- Stated complaint


Stated Complaint: R HIP PX





- Chief complaint


Chief Complaint: Ext Problem





- History obtained from


History obtained from: Patient, EMS





- History of Present Illness


PD HPI LOW EXT INJURY LOCATION: Other (Last month and he had an elective right 

total hip replacement which was complicated postoperatively by pulmonary 

embolism with underlying oxygen dependent COPD.  3 days ago he was at home and 

had a ground-level fall onto a wood floor on his right side and has not been 

able to walk or bear weight since and has severe right hip pain.  No other 

injuries.  He is anticoagulated at this juncture, although he seems confused 

about what medications he is taking.)





Review of Systems


Ten Systems: 10 systems reviewed and negative


Constitutional: reports: Reviewed and negative


Cardiac: reports: Reviewed and negative


Respiratory: reports: Dyspnea (Chronic)





PD PAST MEDICAL HISTORY





- Past Medical History


Cardiovascular: Hypertension, Pulmonary embolism


Respiratory: COPD


Neuro: None


Endocrine/Autoimmune: None


GI: Colon polyps


: None


HEENT: None


Psych: Anxiety


Musculoskeletal: Osteoarthritis


Derm: None





- Past Surgical History


Past Surgical History: No


Ortho: Hip replacement





- Present Medications


Home Medications: 


 Ambulatory Orders











 Medication  Instructions  Recorded  Confirmed


 


Ibuprofen 800 mg PO Q8H PRN 04/25/17 08/17/17


 


Lisinopril 40 mg PO DAILY 04/25/17 08/17/17


 


Albuterol Sulfate [Proair Hfa 1 - 2 puffs INH Q4H PRN #1 inhaler 04/28/17 08/17/ 17





Inhaler]   


 


Hydroxyzine Pamoate [Vistaril] 50 mg PO Q4H PRN MDD 4 caps 06/27/17 08/17/17


 


Cyclobenzaprine [Flexeril] 10 mg PO TID PRN 07/10/17 08/17/17


 


Fluticasone/Vilanterol [Breo 1 puffs INH DAILY 07/10/17 08/17/17





Ellipta 100-25 Mcg INH]   


 


Warfarin [Coumadin] 7.5 mg PO DAILY 07/19/17 08/17/17


 


Metoprolol Succinate [Toprol Xl] 25 mg PO DAILY 08/17/17 08/17/17














- Allergies


Allergies/Adverse Reactions: 


 Allergies











Allergy/AdvReac Type Severity Reaction Status Date / Time


 


No Known Drug Allergies Allergy   Verified 08/17/17 15:21














- Social History


Does the pt smoke?: Yes


Smoking Status: Current every day smoker


Does the pt drink ETOH?: Yes


Does the pt have substance abuse?: No





- Family History


Family history: reports: Non contributory





- Immunizations


Immunizations are current?: Yes





PD ED PE NORMAL





- Vitals


Vital signs reviewed: Yes





- General


General: Alert and oriented X 3, No acute distress, Other (Somewhat disheveled 

and poorly kempt)





- HEENT


HEENT: PERRL, EOMI





- Neck


Neck: Supple, no meningeal sign, No bony TTP





- Cardiac


Cardiac: RRR, No murmur





- Abdomen


Abdomen: Soft, Non tender





- Back


Back: No CVA TTP, No spinal TTP





- Derm


Derm: Normal color, Warm and dry





- Extremities


Extremities: Other (Right leg is slightly shortened and externally rotated he 

has severe pain with any range of motion, points to the lateral hip as the 

point of the pain.)





- Neuro


Neuro: Alert and oriented X 3, Normal speech





- Psych


Psych: Normal mood, Normal affect





Results





- Vitals


Vitals: 


 Vital Signs - 24 hr











  08/17/17 08/17/17 08/17/17





  15:17 15:50 16:13


 


Temperature 36.9 C  


 


Heart Rate 92 84 87


 


Respiratory 20 20 22





Rate   


 


Blood Pressure 118/77 74/58 L 89/41 L


 


O2 Saturation 96 4 L 96














  08/17/17 08/17/17





  16:51 17:29


 


Temperature  


 


Heart Rate 85 92


 


Respiratory 24 18





Rate  


 


Blood Pressure 111/56 L 85/45 L


 


O2 Saturation 93 96








 Oxygen











O2 Source [With Activity]      to 93% on 3L O2 w/PLB


 


O2 Source [Without Activity]   to 93% on 3L O2 w/ PLB


 


O2 Source                      Nasal cannula


 


Oxygen Flow Rate               4

















- Labs


Labs: 


 Laboratory Tests











  08/17/17 08/17/17 08/17/17





  15:31 15:31 15:31


 


WBC  11.3 H  


 


RBC  3.24 L  


 


Hgb  10.2 L  


 


Hct  32.4 L  


 


MCV  99.9 H  


 


MCH  31.6 H  


 


MCHC  31.6 L  


 


RDW  16.4 H  


 


Plt Count  380  


 


MPV  7.0 L  


 


Neut #  8.7 H  


 


Lymph #  1.2 L  


 


Mono #  1.2 H  


 


Eos #  0.1  


 


Baso #  0.1  


 


Absolute Nucleated RBC  0.05  


 


Nucleated RBCs  0.5  


 


PT   16.3 H 


 


INR   1.5 H 


 


Sodium    137


 


Potassium    4.5


 


Chloride    96 L


 


Carbon Dioxide    28


 


Anion Gap    13.0


 


BUN    61 H


 


Creatinine    4.3 H


 


Estimated GFR (MDRD)    14 L


 


Glucose    109 H


 


Calcium    6.8 L


 


Total Bilirubin    0.3


 


AST    20


 


ALT    17


 


Alkaline Phosphatase    85


 


Total Protein    6.6 L


 


Albumin    3.2


 


Globulin    3.4


 


Albumin/Globulin Ratio    0.9 L


 


Lipase    18 L














PD MEDICAL DECISION MAKING





- ED course


ED course: 





62-year-old gentleman status post total hip replacement complicated by PE fell 

on his replaced hip a few days ago and she has a periprosthetic hip fracture on 

x-ray.  He is also profoundly dehydrated with acute renal failure.  He was 

hypotensive which did respond to aggressive IV fluid management.  Spoke with 

his orthopedist, Dr. Chaitanya Almeida who will see him in consultation and 

spoke with the hospitalist, Dr. Ruby for admission at 4:46 PM.





Departure





- Departure


Disposition: 64 [facility] DC/[facility]


Clinical Impression: 


 Saloni-prosthetic femoral shaft fracture





Acute renal failure


Qualifiers:


 Acute renal failure type: unspecified Qualified Code(s): N17.9 - Acute kidney 

failure, unspecified


Condition: Serious

## 2017-08-17 NOTE — XRAY REPORT
EXAM:

RIGHT HIP AND PELVIS RADIOGRAPHY

 

EXAM DATE: 8/17/2017 04:40 PM.

 

HISTORY: Hip pain p fall.

 

COMPARISONS: 07/10/2017.

 

TECHNIQUE: 1 view of the pelvis and 1 view of the hip.

 

FINDINGS: 

Bones: Positive for a new vertical acute fracture of the superior right femur at the diaphysis metaph
ysis junction. The fracture extends superiorly towards the greater trochanter. There is a previous bi
polar right hip prosthesis which appears intact and unchanged in location.

 

Joints: There is joint space narrowing and degenerative disease of the left hip.

 

Soft Tissues: Normal. No soft tissue swelling.

 

IMPRESSION: New vertical fracture of the proximal right femur diaphysis and metaphysis, adjacent to a
 right total hip arthroplasty prosthesis. Prosthesis appears intact.

 

[facility]

Referring Provider Line: [phone]

 

SITE ID: [id number]

## 2017-08-17 NOTE — XRAY PRELIMINARY REPORT
Accession: [id number]

Exam: XR Hip w/Pelvis 2-3V RT

 

IMPRESSION: New vertical fracture of the proximal right femur diaphysis and metaphysis, adjacent to a
 right total hip arthroplasty prosthesis. Prosthesis appears intact.

 

[facility]

 

SITE ID: [id number]

## 2017-08-17 NOTE — HISTORY & PHYSICAL EXAMINATION
DATE OF ADMISSION: 08/17/2017

 

Admission from emergency room. History obtained from patient and the electronic 
record. 

 

HISTORY OF PRESENT ILLNESS: This is a 62-year-old white male with a history of 
obesity, hypertension, COPD on inhalers, who underwent an elective hip 
arthroplasty 1 month ago, had a post-op PE, who eventually went to rehab and 
finally was discharged home several weeks ago. Three days ago, he was at home 
and fell to the ground onto a wood floor on his right side (the side that had 
the surgery) and was unable to essentially get up or feed himself or bear any 
weight at all for 2 days. He was brought to the emergency room by EMS. 
Evaluation in the emergency room revealed that he is dehydrated, has a new 
marked elevation of BUN and creatinine and has a fracture of the same hip that 
had recent surgery.

 

The patient complains to me of right hip pain only. The patient denied any 
shortness of breath or chest pain, no fever, cough, no diarrhea. It is unclear 
if he was taking his Coumadin. He is tired and hungry. 

 

REVIEW OF SYSTEMS: A detailed review of systems was performed and only the 
positives are in the HPI.

 

PAST SURGICAL HISTORY: Includes the hip replacement.

 

PAST MEDICAL HISTORY: Hypertension, pulmonary embolism complicating the postop 
course from the hip surgery 1 month ago, COPD, colonic polyps, anxiety, 
osteoarthritis, obesity.

 

SOCIAL HISTORY: He smokes cigarettes and he drinks alcohol, he denies drug use.

 

FAMILY HISTORY: Noncontributory.

 

PHYSICAL EXAMINATION

GENERAL: Reveals a disheveled white male. He is somnolent from getting pain 
medications, but arouses easily and answers appropriately.

VITAL SIGNS: Blood pressure 98/51.  The lowest the patient had was 74/58. His 
pulse is in the 80s-90s, in sinus rhythm. He is afebrile.

HEENT: Shows dry oral mucosa and poor oral dentition and a disheveled 
appearance. His eyes do not appear sunken.

NECK: Shows no JVD in a vertical position. No stiffness.

CHEST:  Clear with no wheezing or rales, but there is a prolonged expiratory 
phase.

HEART: Heart sounds are normal without audible murmurs, rubs, or gallops.

ABDOMEN: Obese and nontender with positive bowel sounds.

EXTREMITIES: Show 1+ edema of the right shin and very dirty feet with normal DP 
pulses.

 

LABORATORY DATA: Sodium 137, potassium 4.5, BUN 61, creatinine 4.3 and on the 
last admission, the creatinine was 0.7. White blood count 11.3, hemoglobin 10.2
, MCV 99.9 which is elevated, RDW 16.4, platelet count normal. INR 1.5. There 
was no magnesium or CPK done.

 

IMAGING: Chest x-ray was not done. Hip and pelvis x-ray shows a new vertical 
fracture of the proximal right femur diaphysis and metaphysis adjacent to the 
right total hip prosthesis, but the prosthesis appears intact.

 

IMPRESSION  

1. Hypotension, which is very likely on the basis of dehydration from lack of 
p.o. intake for approximately 2 days. R/O septic or cardiogenic shock.

2. Acute renal insufficiency, which is likely from dehydration. R/O 
nephrotoxicity from meds.

3. Fracture of the right hip, S/P fall at home

4. Recent elective surgery of the right hip, status post prosthesis which is 
intact.

5. Pulmonary embolism, after the hip surgery, for which the patient was to be 
on Coumadin.

6. Obesity.

7. Hypertension.

8. Chronic obstructive pulmonary disease and continued smoking.

9. Alcohol use with elevated MCV on differential.

10. Anemia, etiology unknown.

 

PLAN: Hydration and follow his electrolytes including calcium and magnesium. 
Check CPK to rule out rhabdomyolysis. Follow his BUN and creatinine and avoid 
nephrotoxic agents. His Motrin, therefore, will not be continued for pain 
relief. Continue his pain medications for the hip fracture. Continue his COPD 
medications. Blood pressure medications will be on hold because of his current 
hypotension. I was told that his orthopod has already been informed as to this 
diagnosis, and there is a plan for repeat surgery. Depending on timing of 
surgery, the Coumadin may be continued to be held in preparation for that 
surgery or could be restarted, this will depend on the orthopedic 
recommendations.

 

 

 

DD:08/17/2017 19:25:00  DT: 08/17/2017 20:41  JOB #: [id number]  EXT JOB #:[id number]

BRAULIO

## 2017-08-18 VITALS — SYSTOLIC BLOOD PRESSURE: 125 MMHG | DIASTOLIC BLOOD PRESSURE: 96 MMHG

## 2017-08-18 LAB
ABG ANALYSIS TIME: 1255
ABG ANALYSIS TIME: 501
ABG ANALYSIS TIME: 701
ABG O2 DEVICE: (no result)
ABG PEAK END EXPIRATORY PRESSU: 5 CMH2O
ABG PEAK END EXPIRATORY PRESSU: 5 CMH2O
ABG PEAK END EXPIRATORY PRESSU: 8 CMH2O
ABG PEAK END EXPIRATORY PRESSU: 8 CMH2O
ABG PRESSURE SUPPORT VENT: 10 CMH2O
ABG SITE OF DRAW: (no result)
ALBUMIN/GLOB SERPL: 0.9 {RATIO} (ref 1–2.2)
ALBUMIN/GLOB SERPL: 0.9 {RATIO} (ref 1–2.2)
ANION GAP SERPL CALCULATED.4IONS-SCNC: 12 MMOL/L (ref 6–13)
ANION GAP SERPL CALCULATED.4IONS-SCNC: 13 MMOL/L (ref 6–13)
ARTERIAL PATENCY WRIST A: POSITIVE
BASE EXCESS BLDMV CALC-SCNC: -10.5 MMOL/L (ref -2–3)
BASE EXCESS BLDMV CALC-SCNC: -11.2 MMOL/L (ref -2–3)
BASE EXCESS BLDMV CALC-SCNC: -11.9 MMOL/L (ref -2–3)
BASE EXCESS BLDMV CALC-SCNC: -5.8 MMOL/L (ref -2–3)
BASOPHILS NFR BLD AUTO: 0 10^3/UL (ref 0–0.1)
BASOPHILS NFR BLD AUTO: 0.1 10^3/UL (ref 0–0.1)
BASOPHILS NFR BLD AUTO: 0.2 %
BASOPHILS NFR BLD AUTO: 0.4 %
BILIRUB BLD-MCNC: 0.3 MG/DL (ref 0.2–1)
BILIRUB BLD-MCNC: 0.4 MG/DL (ref 0.2–1)
BUN SERPL-MCNC: 64 MG/DL (ref 6–20)
BUN SERPL-MCNC: 65 MG/DL (ref 6–20)
CA-I SERPL ISE-MCNC: 0.99 MMOL/L (ref 1.15–1.33)
CALCIUM UR-MCNC: 6.5 MG/DL (ref 8.5–10.3)
CALCIUM UR-MCNC: 6.9 MG/DL (ref 8.5–10.3)
CHLORIDE SERPL-SCNC: 100 MMOL/L (ref 101–111)
CHLORIDE SERPL-SCNC: 99 MMOL/L (ref 101–111)
CO2 BLDA CALC-SCNC: 18.5 MMOL/L (ref 21–29)
CO2 BLDA CALC-SCNC: 18.9 MMOL/L (ref 21–29)
CO2 BLDA CALC-SCNC: 21.1 MMOL/L (ref 21–29)
CO2 BLDA CALC-SCNC: 22.4 MMOL/L (ref 21–29)
CO2 SERPL-SCNC: 24 MMOL/L (ref 21–32)
CO2 SERPL-SCNC: 26 MMOL/L (ref 21–32)
CREAT SERPLBLD-SCNC: 4.7 MG/DL (ref 0.6–1.2)
CREAT SERPLBLD-SCNC: 5.3 MG/DL (ref 0.6–1.2)
CUL URINE ADD CHARGE: (no result)
DEPRECATED HCO3 PLAS-SCNC: 17 MMOL/L (ref 22–26)
DEPRECATED HCO3 PLAS-SCNC: 17.2 MMOL/L (ref 22–26)
DEPRECATED HCO3 PLAS-SCNC: 19.8 MMOL/L (ref 22–26)
DEPRECATED HCO3 PLAS-SCNC: 20.2 MMOL/L (ref 22–26)
EOSINOPHIL # BLD AUTO: 0 10^3/UL (ref 0–0.7)
EOSINOPHIL # BLD AUTO: 0 10^3/UL (ref 0–0.7)
EOSINOPHIL NFR BLD AUTO: 0 %
EOSINOPHIL NFR BLD AUTO: 0.2 %
ERYTHROCYTE [DISTWIDTH] IN BLOOD BY AUTOMATED COUNT: 16.3 % (ref 12–15)
ERYTHROCYTE [DISTWIDTH] IN BLOOD BY AUTOMATED COUNT: 16.5 % (ref 12–15)
ERYTHROCYTE [DISTWIDTH] IN BLOOD BY AUTOMATED COUNT: 17 % (ref 12–15)
GFRSERPLBLD MDRD-ARVRAT: 11 ML/MIN/{1.73_M2} (ref 89–?)
GFRSERPLBLD MDRD-ARVRAT: 13 ML/MIN/{1.73_M2} (ref 89–?)
GLOBULIN SER-MCNC: 3.6 G/DL (ref 2.1–4.2)
GLOBULIN SER-MCNC: 3.7 G/DL (ref 2.1–4.2)
GLUCOSE SERPL-MCNC: 168 MG/DL (ref 70–100)
GLUCOSE SERPL-MCNC: 225 MG/DL (ref 70–100)
HCT VFR BLD AUTO: 29.8 % (ref 42–52)
HCT VFR BLD AUTO: 32.1 % (ref 42–52)
HCT VFR BLD AUTO: 32.8 % (ref 42–52)
HGB UR QL STRIP: 10.2 G/DL (ref 14–18)
HGB UR QL STRIP: 10.3 G/DL (ref 14–18)
HGB UR QL STRIP: 9.6 G/DL (ref 14–18)
INHALED O2 CONCENTRATION: 16 B/MIN
INHALED O2 CONCENTRATION: 21 B/MIN
INHALED O2 CONCENTRATION: 22 B/MIN
INHALED O2 CONCENTRATION: 26 B/MIN
INR PPP: 1.4 (ref 0.8–1.2)
LYMPHOCYTES # SPEC AUTO: 0.6 10^3/UL (ref 1.5–3.5)
LYMPHOCYTES # SPEC AUTO: 0.8 10^3/UL (ref 1.5–3.5)
LYMPHOCYTES NFR BLD AUTO: 5.1 %
LYMPHOCYTES NFR BLD AUTO: 5.7 %
MAGNESIUM SERPL-MCNC: 2.4 MG/DL (ref 1.7–2.8)
MCH RBC QN AUTO: 31.9 PG (ref 27–31)
MCH RBC QN AUTO: 32 PG (ref 27–31)
MCH RBC QN AUTO: 32.5 PG (ref 27–31)
MCHC RBC AUTO-ENTMCNC: 31.5 G/DL (ref 32–36)
MCHC RBC AUTO-ENTMCNC: 31.6 G/DL (ref 32–36)
MCHC RBC AUTO-ENTMCNC: 32.3 G/DL (ref 32–36)
MCV RBC AUTO: 101.1 FL (ref 80–94)
MCV RBC AUTO: 103.2 FL (ref 80–94)
MCV RBC AUTO: 98.7 FL (ref 80–94)
MONOCYTES # BLD AUTO: 1.1 10^3/UL (ref 0–1)
MONOCYTES # BLD AUTO: 1.1 10^3/UL (ref 0–1)
MONOCYTES NFR BLD AUTO: 7.9 %
MONOCYTES NFR BLD AUTO: 8.9 %
NEUTROPHILS # BLD AUTO: 10.5 10^3/UL (ref 1.5–6.6)
NEUTROPHILS # BLD AUTO: 12.3 10^3/UL (ref 1.5–6.6)
NEUTROPHILS # SNV AUTO: 12.3 X10^3/UL (ref 4.8–10.8)
NEUTROPHILS # SNV AUTO: 14.4 X10^3/UL (ref 4.8–10.8)
NEUTROPHILS # SNV AUTO: 9.8 X10^3/UL (ref 4.8–10.8)
NEUTROPHILS NFR BLD AUTO: 85.8 %
NEUTROPHILS NFR BLD AUTO: 85.8 %
NRBC # BLD AUTO: 0.6 /100WBC
NRBC # BLD AUTO: 0.7 /100WBC
PCO2 TEMP ADJ BLDCOA: 39 MMHG (ref 34–45)
PCO2 TEMP ADJ BLDCOA: 48 MMHG (ref 34–45)
PCO2 TEMP ADJ BLDCOA: 54 MMHG (ref 34–45)
PCO2 TEMP ADJ BLDCOA: 71 MMHG (ref 34–45)
PDW BLD AUTO: 7.4 FL (ref 7.4–11.4)
PDW BLD AUTO: 7.4 FL (ref 7.4–11.4)
PDW BLD AUTO: 7.6 FL (ref 7.4–11.4)
PH BLDV: 7.22 [PH] (ref 7.31–7.41)
PH TEMP ADJ BLDA: 7.07 [PH] (ref 7.35–7.45)
PH TEMP ADJ BLDA: 7.12 [PH] (ref 7.35–7.45)
PH TEMP ADJ BLDA: 7.17 [PH] (ref 7.35–7.45)
PH TEMP ADJ BLDA: 7.32 [PH] (ref 7.35–7.45)
PH UR STRIP.AUTO: 5.5 PH (ref 5–7.5)
PHOSPHATE BLD-MCNC: 9.1 MG/DL (ref 2.5–4.6)
PO2 TEMP ADJ BLDCOA: 106 MMHG (ref 80–100)
PO2 TEMP ADJ BLDCOA: 139 MMHG (ref 80–100)
PO2 TEMP ADJ BLDCOA: 187 MMHG (ref 80–100)
PO2 TEMP ADJ BLDCOA: 233 MMHG (ref 80–100)
POTASSIUM SERPL-SCNC: 5 MMOL/L (ref 3.5–5)
POTASSIUM SERPL-SCNC: 6.3 MMOL/L (ref 3.5–5)
PROT SPEC-MCNC: 6.9 G/DL (ref 6.7–8.2)
PROT SPEC-MCNC: 6.9 G/DL (ref 6.7–8.2)
PROTHROM ACT/NOR PPP: 16 SECS (ref 9.9–12.6)
RBC MAR: 3.02 10^6/UL (ref 4.7–6.1)
RBC MAR: 3.17 10^6/UL (ref 4.7–6.1)
RBC MAR: 3.18 10^6/UL (ref 4.7–6.1)
SAO2 % BLDA FROM PO2: 100 % (ref 94–98)
SAO2 % BLDA FROM PO2: 98 % (ref 94–98)
SAO2 % BLDA FROM PO2: 99 % (ref 94–98)
SAO2 % BLDA FROM PO2: 99 % (ref 94–98)
SODIUM SERPLBLD-SCNC: 137 MMOL/L (ref 135–145)
SODIUM SERPLBLD-SCNC: 137 MMOL/L (ref 135–145)
SP GR UR STRIP.AUTO: 1.02 (ref 1–1.03)
UA CHARGE (STRIP ONLY): (no result)
UA W/ MICROSCOPIC CHARGE: YES
UR CULTURE IF IND: (no result)
UROBILINOGEN UR STRIP.AUTO-MCNC: NEGATIVE MG/DL
VENTILATION MODE VENT: (no result)
WBC # BLD: 12.3 X10^3/UL
WBC # BLD: 14.4 X10^3/UL
WBC # UR MANUAL: (no result) /HPF (ref 0–3)

## 2017-08-18 PROCEDURE — 5A1935Z RESPIRATORY VENTILATION, LESS THAN 24 CONSECUTIVE HOURS: ICD-10-PCS | Performed by: INTERNAL MEDICINE

## 2017-08-18 PROCEDURE — 02HV33Z INSERTION OF INFUSION DEVICE INTO SUPERIOR VENA CAVA, PERCUTANEOUS APPROACH: ICD-10-PCS | Performed by: NURSE ANESTHETIST, CERTIFIED REGISTERED

## 2017-08-18 PROCEDURE — 0BH17EZ INSERTION OF ENDOTRACHEAL AIRWAY INTO TRACHEA, VIA NATURAL OR ARTIFICIAL OPENING: ICD-10-PCS | Performed by: INTERNAL MEDICINE

## 2017-08-18 RX ADMIN — HEPARIN SODIUM SCH UNIT: 5000 INJECTION, SOLUTION INTRAVENOUS; SUBCUTANEOUS at 04:15

## 2017-08-18 RX ADMIN — SODIUM CHLORIDE SCH MLS/HR: 900 INJECTION INTRAVENOUS at 05:15

## 2017-08-18 RX ADMIN — PROPOFOL SCH MLS/HR: 10 INJECTION, EMULSION INTRAVENOUS at 03:00

## 2017-08-18 RX ADMIN — PROPOFOL SCH MLS/HR: 10 INJECTION, EMULSION INTRAVENOUS at 13:49

## 2017-08-18 RX ADMIN — DEXTROSE AND SODIUM CHLORIDE SCH: 5; 900 INJECTION, SOLUTION INTRAVENOUS at 04:11

## 2017-08-18 RX ADMIN — PROPOFOL SCH MLS/HR: 10 INJECTION, EMULSION INTRAVENOUS at 10:08

## 2017-08-18 RX ADMIN — SODIUM CHLORIDE SCH MG: 9 INJECTION, SOLUTION INTRAVENOUS at 06:34

## 2017-08-18 RX ADMIN — SODIUM CHLORIDE SCH MLS/HR: 900 INJECTION INTRAVENOUS at 11:13

## 2017-08-18 RX ADMIN — PROPOFOL SCH MLS/HR: 10 INJECTION, EMULSION INTRAVENOUS at 06:36

## 2017-08-18 RX ADMIN — HEPARIN SODIUM SCH UNIT: 5000 INJECTION, SOLUTION INTRAVENOUS; SUBCUTANEOUS at 10:04

## 2017-08-18 NOTE — XRAY PRELIMINARY REPORT
Accession: [id number]

Exam: XR Chest 1 View

 

IMPRESSION: 

1. Interim development of volume loss within the right hemithorax, including possible right lower lob
e collapse. This may be due to mucous plugging. 

2. Right IJ central venous catheter tip projects over the lower SVC. No definite pneumothorax.

 

[facility]

 

SITE ID: [id number]

## 2017-08-18 NOTE — XRAY REPORT
EXAM: 

CHEST RADIOGRAPHY

 

EXAM DATE: 8/18/2017 01:22 AM.

 

CLINICAL HISTORY: Shortness of breath

 

COMPARISON: 07/13/2017 chest x-ray.

 

TECHNIQUE: 1 view.

 

FINDINGS:

Lungs/Pleura: There is pulmonary vascular congestion without lobar consolidation. Diskoid atelectasis
 at the right lung base. No pleural effusion or pneumothorax.

 

Mediastinum: Prominent heart size.

 

Other: None.

 

IMPRESSION: Pulmonary findings suggesting mild fluid overload.

 

[facility]

Referring Provider Line: [phone]

 

SITE ID: [id number]

## 2017-08-18 NOTE — HISTORY & PHYSICAL EXAMINATION
DATE OF ADMISSION: 08/17/2017

 

TRANSFER NOTE: From the medical floor to the ICU.

 

CHIEF COMPLAINT: Difficulty breathing.

 

IDENTIFYING INFORMATION: The patient is an incomplete source of history, as the patient is mostly con
cerned about his pain from his hip fracture after having an elective hip replacement a month ago and 
having a fall causing a fracture of his right hip. The patient's additional information is obtained i
n the handoff from the nurses and the physician of admission, Jayne Zuleta. There is appears also p
ersonal review of past medical records and the examination of this patient.

 

HISTORY OF PRESENT ILLNESS: The patient had a fall at home after going to rehabilitation from electiv
e hip replacement, fell on his right hip, which he had elective hip replacement and had a fracture of
 the right femur diaphysis and metaphysis, a vertical fracture that was adjacent to the right total h
ip arthroplasty prosthesis. 

 

The patient was admitted to the hospital and with his diagnosis in addition to the proximal right fem
ur fracture included:

1. Hypotension.

2. Acute renal insufficiency.

3. Pulmonary embolism a month ago after surgery.

4. Obesity.

5. Hypertension.

6. Chronic obstructive pulmonary disease.

7. Continued chronic tobacco abuse.

8. Chronic alcohol use.

9. Anemia. 

 

The patient, this evening, developed shortness of breath with continued pain in his right hip. He was
 noted to be hypoxic, requiring increased oxygen. The patient was having difficulty with 4 liters and
 then 6 liters, maintaining his O2 saturation. He also developed hypotension again. On examination, t
he patient was sometimes somnolent, but responding to his name and appeared to recognize this physici
an who had cared for him in the past. The patient was having difficulty in maintaining his alertness 
to maintain his respiratory rate and sufficient expansion of his lungs to maintain his oxygenation. T
he patient had obesity of the belly, but nontender, no masses appreciated. Unable to ascertain if the
re is any hepatosplenomegaly. The patient's heart was sinus rhythm in the 90s to low 100s, sinus tach
ycardia. No murmur appreciated. Extremities, no edema and no cyanosis. The patient's blood pressure w
as in the 70s and 80s during this time. The patient's initial impression after auscultating lungs wit
h rhonchi and rales was that he had fluid overload or acute pulmonary edema. 

 

The patient had initial labs drawn and chest x-ray. The chest x-ray showed pulmonary congestion consi
stent with fluid overload or early pulmonary edema. The patient's white count was elevated from 11,00
0 to 14,400. The H and H was unchanged, platelet count 387. The patient's sodium was 137. His potassi
um went from 4.5 to 6.3, chloride 99, CO2 26. The patient's BUN 64 from 61 and 5.3 from 4.3. The imelda
ent's glucose was 109 to 225. Calcium dropped from 6.8 to 6.5. He had an albumin the normal range at 
3.3, normal liver enzymes. His CK from earlier was 562, indicating mild rhabdomyolysis. The troponin 
was 0.05. BNP was 169. The patient's EKG showed sinus rhythm, no peak T waves despite the elevated po
tassium. The patient was given 2 grams of calcium gluconate with a repeat calcium and BMP in 2 hours.
 The patient also received an albuterol treatment. After this, he had an increase in his blood pressu
re. The patient also was given 40 mg of Lasix IV, had initial 200 mL out of dark urine. The patient's
 blood pressure improved to low 100s systolic blood pressure. The oxygenation was still problematic. 
The patient's ventilation was ineffective. The patient was told this and told that he needed to have 
intubation, a breathing machine. The patient did acknowledge his understanding and agreement. The pat
albina's previous CODE STATUS was reconfirmed as being FULL RESUSCITATION. The patient was then intubat
ed without difficulty. Procedure note to follow. The patient had IV access given the potential for th
e need for vasopressor, as well as intravenous sedation with 1 IV fail after a short interval, and An
esthesia was called and a central line placement was in progress. 

 

IMPRESSION

1. Acute respiratory failure.

2. Pulmonary congestion, undetermined etiology. 

 

The patient's etiology for the respiratory failure and pulmonary congestion/pulmonary edema is unclea
r, but needs to be considered as being potentially fluid overload, cardiovascular collapse into an AC
S event or CHF due to the previous hypotensive event, the dehydration, or his hypocalcemia.  

 

DISCUSSION/DECISION MAKING: The patient will have serial troponins, will have an echocardiogram in th
e morning. Continue resuscitation, including ventilation and vigilant monitoring for deterioration. I
f the patient's potassium is not improved or there is further deterioration of his kidney function, h
e will need to be considered for dialysis and transferred to a hospital with availability of these th
erapeutic interventions. The patient's time spent in critical care was 75 minutes.

 

 

 

DD:08/18/2017 02:59:00  DT: 08/18/2017 03:50  JOB #: [id number]  EXT JOB #:[id number]

## 2017-08-18 NOTE — XRAY PRELIMINARY REPORT
Accession: [id number]

Exam: XR Chest 1 View

 

IMPRESSION: Pulmonary findings suggesting mild fluid overload.

 

[facility]

 

SITE ID: [id number]

## 2017-08-18 NOTE — CONSULTATION NOTE
Referring Provider


Name of Referring Provider:: Dr. Oliveira


Consult Date: 08/18/17





Chief Complaint





- Chief Complaint


Chief Complaint: Right hip pain after a ground-level fall at home.





History of Present Illness





- Admitted From


Admitted From:: Emergency department





- History Obtained From


Records Reviewed: ED documents


History obtained from: ED physician


Exam Limitations: Patient intubated and sedated and unable to provide history.





- History of Present Illness


HPI Comment/Other: 





This is a 62-year-old male who had undergone a right primary ([employer] 

[place] uncemented) total hip arthroplasty On 7/10/2017 prior to this 

admission.  He was discharged from the hospital to a rehab facility and noted 

to have sustained a pulmonary embolism for which she was being treated with 

Coumadin.  After several weeks in the rehabilitation facility he was discharged 

to home.





He apparently fell approximately 4 days ago landing on his right hip.  He was 

unable to get to a phone or get himself off the floor and apparently laid there 

for about 2-1/2 days before help could be summoned.  On arrival in the 

emergency room he was noted to be profoundly dehydrated, hypoxic, and with labs 

showing acute renal failure.  He was resuscitated with fluids and admitted to 

the medical surgical floor under the care of the hospitalist.





Overnight he apparently became much more hypoxic and hypotensive, and early 

this morning he was intubated and transferred to the intensive care unit.  He 

is now on a propofol and Levophed drip and arrangements are being made to 

transfer him from [facility] to a [facility].  





Radiographs in the emergency department revealed a [place] B2 fracture of the 

proximal femur with subsidence of the femoral stem.  The acetabular component 

remains intact.





Review of Systems





- Constitutional


Constitutional: reports: Weakness, Weight gain





- Respiratory


Respiratory: reports: SOB with exertion





- All Other Systems


All Other Systems: reports: Reviewed and negative





History





- Past Medical History


Cardiovascular: reports: Hypertension, Pulmonary embolism


Respiratory: reports: COPD


Neuro: reports: None


Endocrine/Autoimmune: reports: None


GI: reports: Colon polyps


: reports: None


HEENT: reports: None


Psych: reports: Anxiety


Musculoskeletal: reports: Osteoarthritis


Derm: reports: None


MRSA Hx?: No





- Past Surgical History


Ortho: reports: Hip replacement





- Family & Social History


Living arrangement: At home


Living Situation: Alone





- Substance History


Use: Uses substance without health or social issues: Tobacco


Tobacco Details: Cigarettes





- POLST


POLST Status: Full Code





Meds/Allgy





- Home Medications


Home Medications: 


 Ambulatory Orders











 Medication  Instructions  Recorded  Confirmed


 


Ibuprofen 800 mg PO Q8H PRN 04/25/17 08/17/17


 


Lisinopril 40 mg PO DAILY 04/25/17 08/17/17


 


Albuterol Sulfate [Proair Hfa 1 - 2 puffs INH Q4H PRN #1 inhaler 04/28/17 08/17/ 17





Inhaler]   


 


Hydroxyzine Pamoate [Vistaril] 50 mg PO Q4H PRN MDD 4 caps 06/27/17 08/17/17


 


Cyclobenzaprine [Flexeril] 10 mg PO DAILY PM PRN 07/10/17 08/18/17


 


Fluticasone/Vilanterol [Breo 1 puffs INH DAILY 07/10/17 08/17/17





Ellipta 100-25 Mcg INH]   


 


Warfarin [Coumadin] 7.5 mg PO DAILY 07/19/17 08/17/17


 


Metoprolol Succinate [Toprol Xl] 25 mg PO DAILY 08/17/17 08/17/17


 


Gabapentin [Gabapentin] 300 mg PO BID 08/18/17 08/18/17


 


Hydrocodone/Acetaminophen 1 tab PO Q4H PRN 08/18/17 08/18/17





[Hydrocodon-Acetaminophen 5-325]   














- Allergies


Allergies/Adverse Reactions: 


 Allergies











Allergy/AdvReac Type Severity Reaction Status Date / Time


 


No Known Drug Allergies Allergy   Verified 08/17/17 15:21














Exam





- Vital Signs


Reviewed Vital Signs: Yes


Vital Signs: 





 Vital Signs x48h











  Temp Pulse Pulse Pulse Resp BP Pulse Ox


 


 08/18/17 09:16   78     


 


 08/18/17 09:00     87  26 H  112/101 H  100


 


 08/18/17 08:00  36.1 C L    67  19  126/84 H  100


 


 08/18/17 07:36     68  26 H  126/80  100


 


 08/18/17 07:00     79  26 H  114/82 H  100


 


 08/18/17 06:43   74     


 


 08/18/17 06:00     69  26 H  125/90 H  99


 


 08/18/17 05:12   74     


 


 08/18/17 05:00  36.0 C L    77  24  111/77  97


 


 08/18/17 04:00    85   19  101/66  85 L


 


 08/18/17 03:00  35.8 C L   105 H   17  144/90 H  100


 


 08/18/17 02:30    102 H    124/76  100


 


 08/18/17 02:28    101 H   12  109/74 


 


 08/18/17 02:22    102 H   12  88/67 L  100


 


 08/18/17 02:15   98     














- Physical Exam


General Appearance: positive: Other (Intubated and heavily sedated.)


Eyes Bilateral: positive: Normal inspection


ENT: positive: Other (Intubated)


Neck: positive: Nml inspection


Respiratory: positive: Wheezes, Other (Distant breath sounds)


Cardiovascular: positive: Regular rate & rhythm


Peripheral Pulses: positive: 2+


Abdomen: positive: Nml bowel sounds


Back: positive: Nml inspection


Skin: positive: Color nml, No rash, Warm, Dry, Other (Well-healed surgical 

incision without erythema or fluctuance over her right hip.)


Extremities: positive: Nml appearance


Neurologic/Psychiatric: positive: Other (Intubated and sedated.)





Conclusion/Plan





- Diagnosis


Diagnosis: 1.  Pulmonary failure with recent history of pulmonary embolism.  2.

  Acute renal failure.  3.  [place] B2 right femur periprosthetic fracture.





- Plan


Plan: 





In consultation with the hospitalist the patient is deemed to be requiring 

higher level of medical care with respect to probable dialysis and pulmonary 

support.  They are recommending immediate transfer to a [facility] facility with 

higher medical capabilities.  I have asked to be notified as to the facility he 

will be transferred to so that I can coordinate his orthopedic care with an 

orthopedist at that institution.





- Lab Results


Fish Bones: 


 08/18/17 04:25





 08/18/17 04:25





- Diagnostic Imaging Results


Diagnostic Imaging Results: positive: Read independently ([place] B2 fracture 

of the proximal right femoral metaphysis on the medial side with subsidence of 

the femoral stem.)

## 2017-08-18 NOTE — PROVIDER PROGRESS NOTE
Hospitalist Cross-cover Note





- Cross-Cover Note


Cross-Cover Note: 





Pt not going to Orthopedic surgery here.


Will place on full therapeutic dose of iv Heparin.

## 2017-08-18 NOTE — XRAY REPORT
EXAM: 

CHEST RADIOGRAPHY

 

EXAM DATE: 8/18/2017 03:17 AM.

 

CLINICAL HISTORY: Intubation, IJ line placement.

 

COMPARISON: 08/18/2017 at 1:26 a.m.

 

TECHNIQUE: 1 view.

 

FINDINGS:

Lungs/Pleura: A small aspect of the left lateral costophrenic sulcus region is not imaged on this exa
m. Since the prior examination, there has been volume loss within the right hemithorax. Moderate elev
ation of the right hemidiaphragm. Subsegmental opacity within the right lung base region is noted. No
 definite pneumothorax. No large effusion.

 

Mediastinum: There is stable mild enlargement of the cardiac silhouette.

 

Other: Endotracheal tube tip projects 7.9 cm above yg. Orogastric tube extends below inferior mar
gin of the film. Right IJ central venous catheter tip projects over the lower aspect of SVC.

 

IMPRESSION: 

1. Interim development of volume loss within the right hemithorax, including possible right lower lob
e collapse. This may be due to mucous plugging. 

2. Right IJ central venous catheter tip projects over the lower SVC. No definite pneumothorax.

 

[facility]

Referring Provider Line: [phone]

 

SITE ID: [id number]

## 2017-08-18 NOTE — PROCEDURE REPORT
DATE OF PROCEDURE:  08/18/2017 00:00:00

 

PROCEDURE: Intubation.

 

The patient was found to be in acute respiratory failure and unable to maintain his ventilatory capac
ity despite high flow oxygen. This is partly because of his pain from both his hip fracture and somno
lence. With the respiratory failure, the patient was in need of intubation and ventilation. The patie
nt was told that he needed this procedure and had previously been FULL CODE STATUS. This was reconfir
med with the patient and he stated his understanding of what needed to be done agreed. The patient wa
s given sedation and appropriate positioning with 4 mg of Versed and 1 ampule of rocuronium. The imelda
ent was intubated using a #4 [place] and an #8 endotracheal tube guide. The tube was placed at 23 a
nd a followup chest x-ray is pending. The breath sounds are heard on both sides and the capnometer im
mediately registered yellow indicating CO2 clearance. The patient tolerated the procedure well and th
e ET tube was secured by nursing and respiratory therapy.

 

 

 

DD:08/18/2017 02:59:00  DT: 08/18/2017 03:58  JOB #: [id number]  EXT JOB #:[id number]

## 2017-08-29 NOTE — PROVIDER PROGRESS NOTE
Assessment/Plan





- Problem List


(1) Acute respiratory failure with hypoxia and hypercapnia


Assessment/Plan: 


Pt intubated several hours ago. Will transfer to facility with higher level of 

care, see Dr Karthik Almeida (H&P) note of 8/18/17








(2) Shock


Assessment/Plan: 


Despite volume replacement, pt is still hypotensive and was started on a 

Levophed drip to maintain mean BP 70. Etiology of marked hypotension is unclear 

and needs evaluation and management. Pt will require higher level of care for > 

3 days.








(3) MILAN (acute kidney injury)


Assessment/Plan: 


Worsening creat despite volume replacement for presumed volume depletion. Pt 

may need hemodialysis. Pt will require transfer for higher level of care.








(4) Saloni-prosthetic femoral shaft fracture


Assessment/Plan: 


S/P fall at his home, with an acute fracture. Due to multiple medical problems 

requiring higher level of care and transfer, hip surgery will not be done here.








- Lab Result


Fish Bone Diagrams: 


 08/18/17 12:07





 08/18/17 04:25





Subjective





- Subjective


Patient Reports: Other


Nursing Reports: Sedated





Objective


Vital Signs: 





 Oxygen











O2 Source                      Mechanical ventilator














General: Other (Sedated and on a Prpofol drip while intubated)


HEENT: Mucous membr. moist/pink


Neck: Supple


Neuro: Other


Cardiovascular: Regular rate, No murmurs


Respiratory: Breath sounds nml, Other (Intubated)


Abdomen: Soft, Other (Distended)


Extremities: No edema





- Results


Results: 





 Laboratory Results











WBC  9.8 x10^3/uL (4.8-10.8)   08/18/17  12:07    


 


RBC  3.02 10^6/uL (4.70-6.10)  L  08/18/17  12:07    


 


Hgb  9.6 g/dL (14.0-18.0)  L  08/18/17  12:07    


 


Hct  29.8 % (42.0-52.0)  L  08/18/17  12:07    


 


MCV  98.7 fL (80.0-94.0)  H  08/18/17  12:07    


 


MCH  31.9 pg (27.0-31.0)  H  08/18/17  12:07    


 


MCHC  32.3 g/dL (32.0-36.0)   08/18/17  12:07    


 


RDW  16.5 % (12.0-15.0)  H  08/18/17  12:07    


 


Plt Count  357 10^3/uL (130-450)   08/18/17  12:07    


 


MPV  7.6 fL (7.4-11.4)   08/18/17  12:07    


 


Neut #  10.5 10^3/uL (1.5-6.6)  H  08/18/17  04:25    


 


Lymph #  0.6 10^3/uL (1.5-3.5)  L  08/18/17  04:25    


 


Mono #  1.1 10^3/uL (0.0-1.0)  H  08/18/17  04:25    


 


Eos #  0.0 10^3/uL (0.0-0.7)   08/18/17  04:25    


 


Baso #  0.0 10^3/uL (0.0-0.1)   08/18/17  04:25    


 


Absolute Nucleated RBC  0.09 x10^3/uL  08/18/17  04:25    


 


Nucleated RBCs  0.7 /100WBC  08/18/17  04:25    


 


PT  16.0 secs (9.9-12.6)  H  08/18/17  04:25    


 


INR  1.4  (0.8-1.2)  H  08/18/17  04:25    


 


Anti-Xa Level  0.3 U/mL (-0.7)  08/18/17  12:07    


 


Bld Gas Analysis Time  1255   08/18/17  12:45    


 


Sample Site  RIGHT RADIAL   08/18/17  12:45    


 


ABG pH  7.32  (7.35-7.45)  L  08/18/17  12:45    


 


ABG pCO2  39 mmHg (34-45)   08/18/17  12:45    


 


ABG pO2  106 mmHg ()  H  08/18/17  12:45    


 


ABG HCO3  19.8 mmol/L (22.0-26.0)  L  08/18/17  12:45    


 


ABG Total CO2  21.1 MMOL/L (21.0-29.0)   08/18/17  12:45    


 


ABG O2 Saturation  98 % (94-98)   08/18/17  12:45    


 


ABG Base Excess  -5.8 mmol/L (-2.0-3.0)  L  08/18/17  12:45    


 


Real Test  POSITIVE   08/18/17  12:45    


 


VBG pH  7.220  (7.31-7.41)  L  08/18/17  10:03    


 


Ionized Calcium  0.99 mmol/L (1.15-1.33)  L  08/18/17  10:03    


 


Respiration Rate  26 b/min  08/18/17  12:45    


 


O2 Delivery Device  VENTILATOR   08/18/17  12:45    


 


Vent Mode  SIMV   08/18/17  12:45    


 


FiO2  80.00   08/18/17  12:45    


 


Tidal Volume  450 mL  08/18/17  12:45    


 


PEEP  5 cmH2O  08/18/17  12:45    


 


Pressure Support Vent  10 cmH2O  08/18/17  12:45    


 


Sodium  137 mmol/L (135-145)   08/18/17  04:25    


 


Potassium  5.0 mmol/L (3.5-5.0)   08/18/17  04:25    


 


Chloride  100 mmol/L (101-111)  L  08/18/17  04:25    


 


Carbon Dioxide  24 mmol/L (21-32)   08/18/17  04:25    


 


Anion Gap  13.0  (6-13)   08/18/17  04:25    


 


BUN  65 mg/dL (6-20)  H  08/18/17  04:25    


 


Creatinine  4.7 mg/dL (0.6-1.2)  H  08/18/17  04:25    


 


Estimated GFR (MDRD)  13  (>89)  L  08/18/17  04:25    


 


Glucose  168 mg/dL ()  H  08/18/17  04:25    


 


POC Whole Bld Glucose  116 mg/dL (70 - 100)  H  08/18/17  12:10    


 


Lactic Acid  1.2 mmol/L (0.5-2.2)   08/18/17  08:19    


 


Calcium  6.9 mg/dL (8.5-10.3)  L  08/18/17  04:25    


 


Phosphorus  9.1 mg/dL (2.5-4.6)  H  08/18/17  04:25    


 


Magnesium  2.4 mg/dL (1.7-2.8)   08/18/17  04:25    


 


Total Bilirubin  0.4 mg/dL (0.2-1.0)   08/18/17  04:25    


 


AST  20 IU/L (10-42)   08/18/17  04:25    


 


ALT  20 IU/L (10-60)   08/18/17  04:25    


 


Alkaline Phosphatase  89 IU/L ()   08/18/17  04:25    


 


Total Creatine Kinase  562 IU/L ()  H  08/17/17  15:31    


 


Troponin I  0.14 ng/mL (<0.49)   08/18/17  13:35    


 


B-Natriuretic Peptide  169 pg/mL (5-100)  H  08/18/17  01:16    


 


Total Protein  6.9 g/dL (6.7-8.2)   08/18/17  04:25    


 


Albumin  3.2 g/dL (3.2-5.5)   08/18/17  04:25    


 


Globulin  3.7 g/dL (2.1-4.2)   08/18/17  04:25    


 


Albumin/Globulin Ratio  0.9  (1.0-2.2)  L  08/18/17  04:25    


 


Lipase  18 U/L (22-51)  L  08/17/17  15:31    


 


Urine Color  YELLOW   08/18/17  03:30    


 


Urine Clarity  CLEAR  (CLEAR)   08/18/17  03:30    


 


Urine pH  5.5 PH (5.0-7.5)   08/18/17  03:30    


 


Ur Specific Gravity  1.025  (1.002-1.030)   08/18/17  03:30    


 


Urine Protein  TRACE mg/dL (NEGATIVE)   08/18/17  03:30    


 


Urine Glucose (UA)  NEGATIVE mg/dL (NEGATIVE)   08/18/17  03:30    


 


Urine Ketones  NEGATIVE mg/dL (NEGATIVE)   08/18/17  03:30    


 


Urine Occult Blood  SMALL  (NEGATIVE)  H  08/18/17  03:30    


 


Urine Nitrite  NEGATIVE  (NEGATIVE)   08/18/17  03:30    


 


Urine Bilirubin  NEGATIVE  (NEGATIVE)   08/18/17  03:30    


 


Urine Urobilinogen  0.2 (NORMAL) E.U./dL (NORMAL)   08/18/17  03:30    


 


Ur Leukocyte Esterase  NEGATIVE  (NEGATIVE)   08/18/17  03:30    


 


Urine RBC  6-10 /HPF (0-5)  H  08/18/17  03:30    


 


Urine WBC  0-3 /HPF (0-3)   08/18/17  03:30    


 


Ur Squamous Epith Cells  FEW Squamous  (<= Few)   08/18/17  03:30    


 


Urine Bacteria  Few /HPF (None Seen)   08/18/17  03:30    


 


Urine Casts  3-5 Hyaline Casts /LPF0-2 WBC Casts /LPF  08/18/17  03:30    


 


Urine Casts  3-5 Hyaline Casts /LPF0-2 WBC Casts /LPF  08/18/17  03:30    


 


Urine Mucus  Few Strands   08/18/17  03:30    


 


Ur Microscopic Review  INDICATED   08/18/17  03:30    


 


Urine Culture Comments  NOT INDICATED   08/18/17  03:30    














- Procedures


Procedures: 





Procedures





REPLACEMENT OF R HIP JT WITH METAL ON POLY, OPEN APPROACH (07/10/17)

## 2018-01-25 ENCOUNTER — HOSPITAL ENCOUNTER (OUTPATIENT)
Dept: HOSPITAL 76 - LAB | Age: 63
Discharge: HOME | End: 2018-01-25
Attending: NURSE PRACTITIONER
Payer: MEDICAID

## 2018-01-25 DIAGNOSIS — D64.9: Primary | ICD-10-CM

## 2018-01-25 LAB
ALBUMIN DIAFP-MCNC: 4.3 G/DL (ref 3.2–5.5)
ALBUMIN/GLOB SERPL: 1.4 {RATIO} (ref 1–2.2)
ALP SERPL-CCNC: 92 IU/L (ref 42–121)
ALT SERPL W P-5'-P-CCNC: 24 IU/L (ref 10–60)
ANION GAP SERPL CALCULATED.4IONS-SCNC: 13 MMOL/L (ref 6–13)
AST SERPL W P-5'-P-CCNC: 20 IU/L (ref 10–42)
BASOPHILS NFR BLD AUTO: 0.1 10^3/UL (ref 0–0.1)
BASOPHILS NFR BLD AUTO: 1.1 %
BILIRUB BLD-MCNC: 0.8 MG/DL (ref 0.2–1)
BUN SERPL-MCNC: 16 MG/DL (ref 6–20)
CALCIUM UR-MCNC: 9.2 MG/DL (ref 8.5–10.3)
CHLORIDE SERPL-SCNC: 99 MMOL/L (ref 101–111)
CO2 SERPL-SCNC: 25 MMOL/L (ref 21–32)
CREAT SERPLBLD-SCNC: 1 MG/DL (ref 0.6–1.2)
EOSINOPHIL # BLD AUTO: 0.1 10^3/UL (ref 0–0.7)
EOSINOPHIL NFR BLD AUTO: 1.3 %
ERYTHROCYTE [DISTWIDTH] IN BLOOD BY AUTOMATED COUNT: 17.6 % (ref 12–15)
GFRSERPLBLD MDRD-ARVRAT: 75 ML/MIN/{1.73_M2} (ref 89–?)
GLOBULIN SER-MCNC: 3.1 G/DL (ref 2.1–4.2)
GLUCOSE SERPL-MCNC: 92 MG/DL (ref 70–100)
HGB UR QL STRIP: 15.1 G/DL (ref 14–18)
LYMPHOCYTES # SPEC AUTO: 1.5 10^3/UL (ref 1.5–3.5)
LYMPHOCYTES NFR BLD AUTO: 21.4 %
MCH RBC QN AUTO: 30.2 PG (ref 27–31)
MCHC RBC AUTO-ENTMCNC: 32.8 G/DL (ref 32–36)
MCV RBC AUTO: 92.3 FL (ref 80–94)
MONOCYTES # BLD AUTO: 0.8 10^3/UL (ref 0–1)
MONOCYTES NFR BLD AUTO: 11.4 %
NEUTROPHILS # BLD AUTO: 4.7 10^3/UL (ref 1.5–6.6)
NEUTROPHILS # SNV AUTO: 7.2 X10^3/UL (ref 4.8–10.8)
NEUTROPHILS NFR BLD AUTO: 64.8 %
PDW BLD AUTO: 8.3 FL (ref 7.4–11.4)
PLATELET # BLD: 287 10^3/UL (ref 130–450)
PROT SPEC-MCNC: 7.4 G/DL (ref 6.7–8.2)
RBC MAR: 4.99 10^6/UL (ref 4.7–6.1)
SODIUM SERPLBLD-SCNC: 137 MMOL/L (ref 135–145)

## 2018-01-25 PROCEDURE — 36415 COLL VENOUS BLD VENIPUNCTURE: CPT

## 2018-01-25 PROCEDURE — 80053 COMPREHEN METABOLIC PANEL: CPT

## 2018-01-25 PROCEDURE — 82043 UR ALBUMIN QUANTITATIVE: CPT

## 2018-01-25 PROCEDURE — 85025 COMPLETE CBC W/AUTO DIFF WBC: CPT

## 2018-04-10 ENCOUNTER — HOSPITAL ENCOUNTER (INPATIENT)
Dept: HOSPITAL 76 - ED | Age: 63
LOS: 2 days | Discharge: HOME | DRG: 292 | End: 2018-04-12
Attending: FAMILY MEDICINE | Admitting: FAMILY MEDICINE
Payer: MEDICAID

## 2018-04-10 DIAGNOSIS — Z86.718: ICD-10-CM

## 2018-04-10 DIAGNOSIS — J44.1: ICD-10-CM

## 2018-04-10 DIAGNOSIS — F17.210: ICD-10-CM

## 2018-04-10 DIAGNOSIS — I11.0: Primary | ICD-10-CM

## 2018-04-10 DIAGNOSIS — Z79.51: ICD-10-CM

## 2018-04-10 DIAGNOSIS — M19.90: ICD-10-CM

## 2018-04-10 DIAGNOSIS — I50.9: ICD-10-CM

## 2018-04-10 DIAGNOSIS — Z79.899: ICD-10-CM

## 2018-04-10 DIAGNOSIS — L21.9: ICD-10-CM

## 2018-04-10 DIAGNOSIS — F41.9: ICD-10-CM

## 2018-04-10 DIAGNOSIS — Z86.711: ICD-10-CM

## 2018-04-10 LAB
ALBUMIN DIAFP-MCNC: 3.8 G/DL (ref 3.2–5.5)
ALBUMIN/GLOB SERPL: 1.3 {RATIO} (ref 1–2.2)
ALP SERPL-CCNC: 88 IU/L (ref 42–121)
ALT SERPL W P-5'-P-CCNC: 27 IU/L (ref 10–60)
ANION GAP SERPL CALCULATED.4IONS-SCNC: 7 MMOL/L (ref 6–13)
AST SERPL W P-5'-P-CCNC: 15 IU/L (ref 10–42)
BASOPHILS NFR BLD AUTO: 0.1 10^3/UL (ref 0–0.1)
BASOPHILS NFR BLD AUTO: 0.8 %
BILIRUB BLD-MCNC: 0.5 MG/DL (ref 0.2–1)
BUN SERPL-MCNC: 45 MG/DL (ref 6–20)
CALCIUM UR-MCNC: 8.2 MG/DL (ref 8.5–10.3)
CHLORIDE SERPL-SCNC: 95 MMOL/L (ref 101–111)
CO2 SERPL-SCNC: 34 MMOL/L (ref 21–32)
CREAT SERPLBLD-SCNC: 1.2 MG/DL (ref 0.6–1.2)
EOSINOPHIL # BLD AUTO: 0.1 10^3/UL (ref 0–0.7)
EOSINOPHIL NFR BLD AUTO: 0.8 %
ERYTHROCYTE [DISTWIDTH] IN BLOOD BY AUTOMATED COUNT: 15.2 % (ref 12–15)
GFRSERPLBLD MDRD-ARVRAT: 61 ML/MIN/{1.73_M2} (ref 89–?)
GLOBULIN SER-MCNC: 3 G/DL (ref 2.1–4.2)
GLUCOSE SERPL-MCNC: 104 MG/DL (ref 70–100)
HGB UR QL STRIP: 14.1 G/DL (ref 14–18)
INR PPP: 1.1 (ref 0.8–1.2)
LIPASE SERPL-CCNC: 15 U/L (ref 22–51)
LYMPHOCYTES # SPEC AUTO: 1.3 10^3/UL (ref 1.5–3.5)
LYMPHOCYTES NFR BLD AUTO: 14.4 %
MCH RBC QN AUTO: 30.3 PG (ref 27–31)
MCHC RBC AUTO-ENTMCNC: 32.4 G/DL (ref 32–36)
MCV RBC AUTO: 93.7 FL (ref 80–94)
MONOCYTES # BLD AUTO: 1.1 10^3/UL (ref 0–1)
MONOCYTES NFR BLD AUTO: 11.7 %
NEUTROPHILS # BLD AUTO: 6.5 10^3/UL (ref 1.5–6.6)
NEUTROPHILS # SNV AUTO: 9 X10^3/UL (ref 4.8–10.8)
NEUTROPHILS NFR BLD AUTO: 72.3 %
PDW BLD AUTO: 7.9 FL (ref 7.4–11.4)
PLATELET # BLD: 340 10^3/UL (ref 130–450)
PROT SPEC-MCNC: 6.8 G/DL (ref 6.7–8.2)
PROTHROM ACT/NOR PPP: 12.8 SECS (ref 9.9–12.6)
RBC MAR: 4.64 10^6/UL (ref 4.7–6.1)
SODIUM SERPLBLD-SCNC: 136 MMOL/L (ref 135–145)

## 2018-04-10 PROCEDURE — 94640 AIRWAY INHALATION TREATMENT: CPT

## 2018-04-10 PROCEDURE — 99283 EMERGENCY DEPT VISIT LOW MDM: CPT

## 2018-04-10 PROCEDURE — 84484 ASSAY OF TROPONIN QUANT: CPT

## 2018-04-10 PROCEDURE — 93306 TTE W/DOPPLER COMPLETE: CPT

## 2018-04-10 PROCEDURE — 96375 TX/PRO/DX INJ NEW DRUG ADDON: CPT

## 2018-04-10 PROCEDURE — 71046 X-RAY EXAM CHEST 2 VIEWS: CPT

## 2018-04-10 PROCEDURE — 85025 COMPLETE CBC W/AUTO DIFF WBC: CPT

## 2018-04-10 PROCEDURE — 80053 COMPREHEN METABOLIC PANEL: CPT

## 2018-04-10 PROCEDURE — 80048 BASIC METABOLIC PNL TOTAL CA: CPT

## 2018-04-10 PROCEDURE — 85610 PROTHROMBIN TIME: CPT

## 2018-04-10 PROCEDURE — 71045 X-RAY EXAM CHEST 1 VIEW: CPT

## 2018-04-10 PROCEDURE — 83880 ASSAY OF NATRIURETIC PEPTIDE: CPT

## 2018-04-10 PROCEDURE — 83690 ASSAY OF LIPASE: CPT

## 2018-04-10 PROCEDURE — 96374 THER/PROPH/DIAG INJ IV PUSH: CPT

## 2018-04-10 PROCEDURE — 93005 ELECTROCARDIOGRAM TRACING: CPT

## 2018-04-10 PROCEDURE — 36415 COLL VENOUS BLD VENIPUNCTURE: CPT

## 2018-04-10 PROCEDURE — 99284 EMERGENCY DEPT VISIT MOD MDM: CPT

## 2018-04-10 RX ADMIN — IPRATROPIUM BROMIDE AND ALBUTEROL SULFATE SCH ML: 2.5; .5 SOLUTION RESPIRATORY (INHALATION) at 20:00

## 2018-04-10 RX ADMIN — BUDESONIDE SCH MG: 0.5 SUSPENSION RESPIRATORY (INHALATION) at 20:00

## 2018-04-10 RX ADMIN — MICONAZOLE NITRATE SCH APPLIC: 2 OINTMENT TOPICAL at 16:12

## 2018-04-10 RX ADMIN — TRIAMCINOLONE ACETONIDE SCH APPLIC: 5 CREAM TOPICAL at 20:20

## 2018-04-10 RX ADMIN — SODIUM CHLORIDE, PRESERVATIVE FREE SCH ML: 5 INJECTION INTRAVENOUS at 16:13

## 2018-04-10 RX ADMIN — MICONAZOLE NITRATE SCH APPLIC: 2 OINTMENT TOPICAL at 20:20

## 2018-04-10 RX ADMIN — OXYCODONE PRN MG: 5 TABLET ORAL at 16:18

## 2018-04-10 RX ADMIN — GABAPENTIN SCH MG: 300 CAPSULE ORAL at 20:20

## 2018-04-10 RX ADMIN — FORMOTEROL FUMARATE DIHYDRATE SCH MCG: 20 SOLUTION RESPIRATORY (INHALATION) at 20:00

## 2018-04-10 NOTE — ED PHYSICIAN DOCUMENTATION
PD HPI DYSPNEA





- Stated complaint


Stated Complaint: ANXIETY/SOA





- Chief complaint


Chief Complaint: Resp





- History obtained from


History obtained from: Patient





- History of Present Illness


Timing - onset: How many days ago (5)


Timing - onset during: Rest


Timing - duration: Days (5)


Timing - details: Gradual onset, Still present


Inciting event(s): URI


Improved by: O2, Inhaler/neb, Sitting up


Worsened by: Exertion, Coughing


Associated symptoms: Cough, Wheezing, Anxiety


Similar symptoms before: Diagnosis (COPD)


Recently seen: Not recently seen





- Additional information


Additional information: 





62-year-old male with a history of COPD and prior episode of respiratory 

failure associated with COPD and pneumonia has developed increasing shortness 

of breath over the past 4 days and today is extremely short of breath.  He is 

not getting relief with his nebulizer at home.  He has been hospitalized with 

COPD several times in the past year.  He states that he began having some 

shortness of breath a bit more than a year ago and has had problems since 

then.He has her a problem previously with pulmonary emboli following surgery 

and he is not on Coumadin now.





Review of Systems


Constitutional: reports: Fatigue.  denies: Fever, Chills


Eyes: denies: Decreased vision


Ears: denies: Ear pain


Nose: reports: Congestion.  denies: Rhinorrhea / runny nose


Throat: denies: Sore throat


Cardiac: reports: Chest pain / pressure, Palpitations


Respiratory: reports: Dyspnea, Cough, Wheezing


GI: denies: Abdominal Pain, Nausea, Vomiting


: denies: Dysuria, Frequency


Skin: denies: Rash


Musculoskeletal: reports: Extremity swelling


Neurologic: denies: Generalized weakness, Focal weakness, Numbness





PD PAST MEDICAL HISTORY





- Past Medical History


Past Medical History: Yes


Cardiovascular: Hypertension, Pulmonary embolism


Respiratory: COPD


Neuro: None


Endocrine/Autoimmune: None


GI: Colon polyps


: None


HEENT: None


Psych: Anxiety


Musculoskeletal: Osteoarthritis


Derm: None





- Past Surgical History


Past Surgical History: No


Ortho: Hip replacement





- Present Medications


Home Medications: 


 Ambulatory Orders











 Medication  Instructions  Recorded  Confirmed


 


Ibuprofen 800 mg PO Q8H PRN 04/25/17 08/17/17


 


Lisinopril 40 mg PO DAILY 04/25/17 08/17/17


 


Albuterol Sulfate [Proair Hfa 1 - 2 puffs INH Q4H PRN #1 inhaler 04/28/17 08/17/ 17





Inhaler]   


 


Hydroxyzine Pamoate [Vistaril] 50 mg PO Q4H PRN MDD 4 caps 06/27/17 08/17/17


 


Cyclobenzaprine [Flexeril] 10 mg PO DAILY PM PRN 07/10/17 08/18/17


 


Fluticasone/Vilanterol [Breo 1 puffs INH DAILY 07/10/17 08/17/17





Ellipta 100-25 Mcg INH]   


 


Warfarin [Coumadin] 7.5 mg PO DAILY 07/19/17 08/17/17


 


Metoprolol Succinate [Toprol Xl] 25 mg PO DAILY 08/17/17 08/17/17


 


Gabapentin [Gabapentin] 300 mg PO BID 08/18/17 08/18/17


 


Hydrocodone/Acetaminophen 1 tab PO Q4H PRN 08/18/17 08/18/17





[Hydrocodon-Acetaminophen 5-325]   














- Allergies


Allergies/Adverse Reactions: 


 Allergies











Allergy/AdvReac Type Severity Reaction Status Date / Time


 


No Known Drug Allergies Allergy   Verified 08/17/17 15:21














- Social History


Does the pt smoke?: Yes


Smoking Status: Current every day smoker


Does the pt drink ETOH?: Yes


Does the pt have substance abuse?: No





- Immunizations


Immunizations are current?: Yes





- POLST


POLST Status: Full Code





PD ED PE NORMAL





- Vitals


Vital signs reviewed: Yes





- General


General: Alert and oriented X 3, Well developed/nourished, Other (patient 

appears anxious and acutely dyspneic)





- HEENT


HEENT: Atraumatic, PERRL, EOMI, Other (dry mucous membranes )





- Neck


Neck: Supple, no meningeal sign, No bony TTP





- Cardiac


Cardiac: No murmur, Other (tachycardic)





- Respiratory


Respiratory: Other (acute respirator distress with diminished breath sounds and 

wheezes bibasilar. No rales )





- Abdomen


Abdomen: Soft, Non tender





- Back


Back: No CVA TTP, No spinal TTP





- Derm


Derm: Normal color, Warm and dry, No rash





- Extremities


Extremities: No deformity, Other (There is trace edema bilaterally )





- Neuro


Neuro: No motor deficit, No sensory deficit


Eye Opening: Spontaneous


Motor: Obeys Commands


Verbal: Oriented


GCS Score: 15





- Psych


Psych: Normal mood, Normal affect





Results





- Vitals


Vitals: 


 Vital Signs - 24 hr











  04/10/18 04/10/18 04/10/18





  11:44 12:18 13:02


 


Temperature 35.9 C L  


 


Heart Rate 102 H 99 95


 


Respiratory 26 H 24 24





Rate   


 


Blood Pressure 153/105 H  138/70 H


 


O2 Saturation 70 L  90 L








 Oxygen











O2 Source []                   to 93% on 3L O2 w/PLB


 


O2 Source []                   to 93% on 3L O2 w/ PLB


 


O2 Source                      Nasal cannula


 


Oxygen Flow Rate               15

















- EKG (time done)


  ** 1208


Rate: Rate (enter#) (93)


Intervals: RBBB (incomplete)


Ischemia: Normal ST segments


Compare to prior EKG: Changed from prior EKG (since prior tracing 8-18-17 PVC's 

have developed. ).  No: Other


Computer interpretation: Agree with computer





- Labs


Labs: 


 Laboratory Tests











  04/10/18 04/10/18 04/10/18





  12:00 12:00 12:00


 


WBC  9.0  


 


RBC  4.64 L  


 


Hgb  14.1  


 


Hct  43.5  


 


MCV  93.7  


 


MCH  30.3  


 


MCHC  32.4  


 


RDW  15.2 H  


 


Plt Count  340  


 


MPV  7.9  


 


Neut #  6.5  


 


Lymph #  1.3 L  


 


Mono #  1.1 H  


 


Eos #  0.1  


 


Baso #  0.1  


 


Absolute Nucleated RBC  0.13  


 


Nucleated RBC %  1.5  


 


PT   12.8 H 


 


INR   1.1 


 


Sodium    136


 


Potassium    4.9


 


Chloride    95 L


 


Carbon Dioxide    34 H


 


Anion Gap    7.0


 


BUN    45 H


 


Creatinine    1.2


 


Estimated GFR (MDRD)    61 L


 


Glucose    104 H


 


Calcium    8.2 L


 


Total Bilirubin    0.5


 


AST    15


 


ALT    27


 


Alkaline Phosphatase    88


 


Troponin I   


 


B-Natriuretic Peptide   


 


Total Protein    6.8


 


Albumin    3.8


 


Globulin    3.0


 


Albumin/Globulin Ratio    1.3


 


Lipase    15 L














  04/10/18 04/10/18





  12:00 12:00


 


WBC  


 


RBC  


 


Hgb  


 


Hct  


 


MCV  


 


MCH  


 


MCHC  


 


RDW  


 


Plt Count  


 


MPV  


 


Neut #  


 


Lymph #  


 


Mono #  


 


Eos #  


 


Baso #  


 


Absolute Nucleated RBC  


 


Nucleated RBC %  


 


PT  


 


INR  


 


Sodium  


 


Potassium  


 


Chloride  


 


Carbon Dioxide  


 


Anion Gap  


 


BUN  


 


Creatinine  


 


Estimated GFR (MDRD)  


 


Glucose  


 


Calcium  


 


Total Bilirubin  


 


AST  


 


ALT  


 


Alkaline Phosphatase  


 


Troponin I  0.05 


 


B-Natriuretic Peptide   418 H


 


Total Protein  


 


Albumin  


 


Globulin  


 


Albumin/Globulin Ratio  


 


Lipase  














- Rads (name of study)


  ** 1 view chest


Radiology: Prelim report reviewed (Impression: Negative portable chest.), EMP 

read indepedently (on my reading there is bibasilar atelctasis/infitrate, 

borderline cardiamegaly and no cephalization of flow. ), See rad report





Procedures





- IVC sono (time)


  ** 1305


Bedside IVC sono: IVC measures (cm) (2.95), IVC collapsed c insp (cm) (2.39), 

High CVP





PD MEDICAL DECISION MAKING





- ED course


Complexity details: reviewed old records, reviewed results, re-evaluated patient

, considered differential, d/w patient


ED course: 





61 y/o male with a history of COPD, pneumonia, and pulmonary embolism has 

developed acute respiratory diffiulty over the past several days and he arrives 

here in distress with diminished breath sounds and hypoxia with room air sat at 

70%. He is administered a duo-neb treatment with improvement and he is 

maintained on oxygen by mask and eventually by N/C. He is administered 125mg of 

solumedrol. Chest x-ray is with mild bibasilar atelecasis. The BNP is mildly 

elevated and the IVC is interrogated and found to be enlarged and plethoric 

consistent with elevated CVP and the patient is administered IV lasix 40mg. 





Departure





- Departure


Disposition: 64 [facility] DC/Xfer


Clinical Impression: 


 COPD with exacerbation





CHF, acute


Qualifiers:


 Heart failure type: unspecified Qualified Code(s): I50.9 - Heart failure, 

unspecified

## 2018-04-10 NOTE — HISTORY & PHYSICAL EXAMINATION
Chief Complaint





- Chief Complaint


Chief Complaint: Shortness of breath





History of Present Illness





- Admitted From


Admitted From:: Home





- History Obtained From


Records Reviewed: yes


History obtained from: patient, ER physician





- History of Present Illness


HPI Comment/Other: 





Mr. Thomas Vasquez is a pleasant 62-year-old male with a history of COPD and 

hypertension who has been having increasing shortness of breath over the last 4 

days.  The patient admits that he has been smoking about half pack of 

cigarettes a day during this time and although he is trying to quit he is 

unable to stop smoking.  He has been experiencing some weakness and decreased 

appetite as well. The patient also had an episode last year where he had 

replacement of his hip and developed a DVT which further developed into a 

pulmonary emboli.  He presented to the [facility] emergency department today 

and was found to have an elevated BNP of 425.  He is short of breath on room 

air but is has oxygen saturations in the mid 90s on 4 L.  He denies any home 

oxygen use but does use home nebulizers.  Chest x-ray was performed which was 

essentially negative but may have shown some pulmonary vascular congestion.  He 

is admitted to a medical surgical bed on telemetry with diagnosis of acute 

exacerbation of COPD and acute exacerbation of CHF.





History





- Past Medical History


Cardiovascular: reports: Hypertension, Deep vein thrombosis, Pulmonary embolism


Respiratory: reports: COPD


Neuro: reports: None


Endocrine/Autoimmune: reports: None


GI: reports: Colon polyps


: reports: None


HEENT: reports: None


Psych: reports: Anxiety


Musculoskeletal: reports: Osteoarthritis


Derm: reports: None


MRSA Hx?: No





- Past Surgical History


Ortho: reports: Hip replacement





- Family & Social History


Family History: Mother: , Cancer (Mother  of lung cancer), Father: 

, Alcoholism (Father  of alcoholism, presumably with cirrhosis.), 

Other family: COPD/Emphysema


Living arrangement: At home


Living Situation: With friend(s)





- Substance History


Use: Uses substance without health or social issues: Alcohol


Abuse: Recurrent use of substance despite neg consequences: Other (The patient 

has COPD and was unable to breathe yet continued to smoke a half a pack of 

cigarettes a day.)


Tobacco Details: Cigarettes





- POLST


Patient has POLST: No


POLST Status: Full Code





Meds/Allgy





- Home Medications


Home Medications: 


 Ambulatory Orders











 Medication  Instructions  Recorded  Confirmed


 


Ibuprofen 800 mg PO Q8H PRN 04/25/17 04/10/18


 


Albuterol Sulfate [Proair Hfa 1 - 2 puffs INH Q4H PRN #1 inhaler 04/28/17 04/10/

18





Inhaler]   


 


Diazepam [Diazepam] 2 mg PO QPM PRN 04/10/18 04/10/18


 


Lisinopril [Lisinopril] 20 mg PO DAILY 04/10/18 04/10/18


 


Mometasone/Formoterol [Dulera 100 2 puffs INH BID 04/10/18 04/10/18





Mcg/5 Mcg Inhaler]   














- Allergies


Allergies/Adverse Reactions: 


 Allergies











Allergy/AdvReac Type Severity Reaction Status Date / Time


 


No Known Drug Allergies Allergy   Verified 17 15:21














Review of Systems





- Constitutional


Constitutional: denies: Fatigue, Fever, Chills





- Eyes


Eyes: denies: Pain, Irritation, Blurred vision, Dipolpia





- Ears, Nose & Throat


Ears, Nose & Throat: denies: Ear pain, Hearing loss, Tinnitus, Nasal pain





- Cardiovascular


Cariovascular: reports: Edema, Exertional dyspnea, Decr. exercise tolerance.  

denies: Irregular heart rate, Palpitations, Chest pain, Syncope





- Respiratory


Respiratory: reports: Wheezing, SOB at rest, SOB with exertion.  denies: Cough, 

Hemoptysis





- Gastrointestinal


Gastrointestinal: denies: Abdominal pain, Abdominal distention, Constipation, 

Diarrhea, Rectal bleeding





- Genitourinary


Genitourinary: denies: Dysuria, Frequency, Urgency, Hematuria





- Musculoskeletal


Musculoskeletal: denies: Muscle pain, Back pain, Muscle aches, Stiffness





- Integumentary


Integumentary: denies: Rash, Pruritis, Lesions, Dryness





- Neurological


Neurological: denies: General weakness, Focal weakness, Headache, Dizziness





- Psychiatric


Psychiatric: denies: Depression, Anxiety, Suicidal





- Endocrine


Endocrine: denies: Polyuria, Polydypsia, Polyphagia





- Hematologic/Lymphatic


Hematologic/Lymphatic: denies: Anemia, Bruising, Petechiae, Lymphadenopathy





- All Other Systems


All Other Systems: reports: Reviewed and negative





Exam





- Vital Signs


Reviewed Vital Signs: Yes


Vital Signs: 





 Vital Signs x48h











  Temp Pulse Resp BP Pulse Ox


 


 04/10/18 13:56   95  25 H  


 


 04/10/18 13:02   95  24  138/70 H  90 L


 


 04/10/18 12:18   99  24  


 


 04/10/18 11:44  35.9 C L  102 H  26 H  153/105 H  70 L














- Physical Exam


General Appearance: positive: No acute distress, Alert, Mild distress


Eyes Bilateral: positive: Normal inspection, PERRL, EOMI, No lid inflammation, 

Conjunctivae nml, No scleral icterus


ENT: positive: ENT inspection nml, Pharynx nml, No signs of dehydration


Neck: positive: Nml inspection, Thyroid nml, No JVD, Trachea midline.  negative

: Thyromegaly


Respiratory: positive: Chest non-tender, No respiratory distress, Breath sounds 

nml.  negative: Wheezes, Rales, Rhonchi


Cardiovascular: positive: Regular rate & rhythm, No murmur, No gallop


Peripheral Pulses: positive: 1+


Abdomen: positive: Non-tender, No organomegaly, Nml bowel sounds, No 

distention.  negative: Guarding, Rebound


Back: positive: Nml inspection.  negative: CVA tenderness (R), CVA tenderness (L

)


Skin: positive: Dry, Skin rash, Other (Skin flaking)


Extremities: negative: Non-tender, Full ROM, Nml appearance


Neurologic/Psychiatric: negative: Oriented x3, CN's nml (2-12), Motor nml, 

Sensation nml, Mood/affect nml





Conclusion/Plan





- Problem List


(1) Acute exacerbation of chronic obstructive pulmonary disease (COPD)


Conclusion/Plan: 


Patient continues to smoke despite his acute exacerbations of COPD.  He says he 

is trying to quit but cannot.  He has been smoking about a half a pack of 

cigarettes a day despite being very short of breath. We will give patient 

supplemental oxygen, DuoNeb bronchodilators, and oral prednisone.








(2) CHF, acute


Conclusion/Plan: 


Newly diagnosed, patient has pedal edema and crackles in his lungs.  We have 

started the patient on Lasix and spironolactone, placed him on a 2 g sodium diet

, and will give him supplemental oxygen as needed.We will gently diurese him


Qualifiers: 


   Heart failure type: unspecified   Qualified Code(s): I50.9 - Heart failure, 

unspecified   





(3) Seborrheic dermatitis


Conclusion/Plan: 


The patient has red flaking skin to his face, consistent with seborrheic 

dermatitis. We will start the patient on a topical antifungal and a topical 

steroid








- Lab Results


Lab results reviewed: Yes


Fish Bones: 


 04/10/18 12:00





 04/10/18 12:00





- Diagnostic Imaging Results


Diagnostic Imaging Results: positive: Final report reviewed


Diagnostic Imaging Results Comments: 





EXAM: 


CHEST RADIOGRAPHY 





EXAM DATE: 4/10/2018 12:50 PM. 





CLINICAL HISTORY: Short of breath wheezing. 





COMPARISON: 2017 





TECHNIQUE: 1 view. 





FINDINGS: 


Lungs/Pleura: No focal opacities evident. No pleural effusion. No pneumothorax. 





Mediastinum: Within exam limitations, the cardiomediastinal contour is normal. 





Other: None. 





IMPRESSION: Negative portable chest. 











- EKG Results


EKG Interpreted Independently: Yes


EKG Comparison: No prior EKG


EKG Findings: 





Normal sinus rhythm with multifocal PVCs





Core Measures





- Anticipated LOS


I expect patient to be DC'd or transferred within 96 hours.: Yes





- DVT/VTE - Prophylaxis


VTE/DVT Device ordered at admit?: Yes

## 2018-04-11 LAB
ANION GAP SERPL CALCULATED.4IONS-SCNC: 7 MMOL/L (ref 6–13)
BUN SERPL-MCNC: 40 MG/DL (ref 6–20)
CALCIUM UR-MCNC: 8.3 MG/DL (ref 8.5–10.3)
CHLORIDE SERPL-SCNC: 92 MMOL/L (ref 101–111)
CO2 SERPL-SCNC: 35 MMOL/L (ref 21–32)
CREAT SERPLBLD-SCNC: 1 MG/DL (ref 0.6–1.2)
ERYTHROCYTE [DISTWIDTH] IN BLOOD BY AUTOMATED COUNT: 15.4 % (ref 12–15)
GFRSERPLBLD MDRD-ARVRAT: 75 ML/MIN/{1.73_M2} (ref 89–?)
GLUCOSE SERPL-MCNC: 143 MG/DL (ref 70–100)
HGB UR QL STRIP: 14 G/DL (ref 14–18)
MCH RBC QN AUTO: 30 PG (ref 27–31)
MCHC RBC AUTO-ENTMCNC: 32.1 G/DL (ref 32–36)
MCV RBC AUTO: 93.5 FL (ref 80–94)
NEUTROPHILS # SNV AUTO: 8.1 X10^3/UL (ref 4.8–10.8)
PDW BLD AUTO: 7.5 FL (ref 7.4–11.4)
PLATELET # BLD: 338 10^3/UL (ref 130–450)
RBC MAR: 4.67 10^6/UL (ref 4.7–6.1)
SODIUM SERPLBLD-SCNC: 134 MMOL/L (ref 135–145)

## 2018-04-11 RX ADMIN — SODIUM CHLORIDE, PRESERVATIVE FREE SCH ML: 5 INJECTION INTRAVENOUS at 01:07

## 2018-04-11 RX ADMIN — METOPROLOL SUCCINATE SCH MG: 25 TABLET, EXTENDED RELEASE ORAL at 08:55

## 2018-04-11 RX ADMIN — SODIUM CHLORIDE, PRESERVATIVE FREE SCH ML: 5 INJECTION INTRAVENOUS at 14:04

## 2018-04-11 RX ADMIN — OXYCODONE PRN MG: 5 TABLET ORAL at 21:37

## 2018-04-11 RX ADMIN — MICONAZOLE NITRATE SCH APPLIC: 2 OINTMENT TOPICAL at 20:40

## 2018-04-11 RX ADMIN — BUDESONIDE SCH MG: 0.5 SUSPENSION RESPIRATORY (INHALATION) at 20:38

## 2018-04-11 RX ADMIN — BUDESONIDE SCH MG: 0.5 SUSPENSION RESPIRATORY (INHALATION) at 07:44

## 2018-04-11 RX ADMIN — OXYCODONE PRN MG: 5 TABLET ORAL at 09:55

## 2018-04-11 RX ADMIN — POLYETHYLENE GLYCOL 3350 SCH: 17 POWDER, FOR SOLUTION ORAL at 09:13

## 2018-04-11 RX ADMIN — FUROSEMIDE SCH MG: 40 TABLET ORAL at 08:56

## 2018-04-11 RX ADMIN — IPRATROPIUM BROMIDE AND ALBUTEROL SULFATE SCH: 2.5; .5 SOLUTION RESPIRATORY (INHALATION) at 05:35

## 2018-04-11 RX ADMIN — OXYCODONE PRN MG: 5 TABLET ORAL at 14:16

## 2018-04-11 RX ADMIN — TRIAMCINOLONE ACETONIDE SCH APPLIC: 5 CREAM TOPICAL at 14:04

## 2018-04-11 RX ADMIN — MICONAZOLE NITRATE SCH APPLIC: 2 OINTMENT TOPICAL at 09:55

## 2018-04-11 RX ADMIN — GABAPENTIN SCH MG: 300 CAPSULE ORAL at 20:40

## 2018-04-11 RX ADMIN — FORMOTEROL FUMARATE DIHYDRATE SCH MCG: 20 SOLUTION RESPIRATORY (INHALATION) at 07:44

## 2018-04-11 RX ADMIN — SODIUM CHLORIDE, PRESERVATIVE FREE SCH ML: 5 INJECTION INTRAVENOUS at 17:49

## 2018-04-11 RX ADMIN — IPRATROPIUM BROMIDE AND ALBUTEROL SULFATE SCH ML: 2.5; .5 SOLUTION RESPIRATORY (INHALATION) at 07:45

## 2018-04-11 RX ADMIN — IPRATROPIUM BROMIDE AND ALBUTEROL SULFATE SCH ML: 2.5; .5 SOLUTION RESPIRATORY (INHALATION) at 11:56

## 2018-04-11 RX ADMIN — OXYCODONE PRN MG: 5 TABLET ORAL at 00:52

## 2018-04-11 RX ADMIN — IPRATROPIUM BROMIDE AND ALBUTEROL SULFATE SCH ML: 2.5; .5 SOLUTION RESPIRATORY (INHALATION) at 20:41

## 2018-04-11 RX ADMIN — OXYCODONE PRN MG: 5 TABLET ORAL at 05:57

## 2018-04-11 RX ADMIN — OXYCODONE PRN MG: 5 TABLET ORAL at 14:18

## 2018-04-11 RX ADMIN — LISINOPRIL SCH MG: 20 TABLET ORAL at 08:56

## 2018-04-11 RX ADMIN — GABAPENTIN SCH MG: 300 CAPSULE ORAL at 08:55

## 2018-04-11 RX ADMIN — IPRATROPIUM BROMIDE AND ALBUTEROL SULFATE SCH ML: 2.5; .5 SOLUTION RESPIRATORY (INHALATION) at 15:38

## 2018-04-11 RX ADMIN — TRIAMCINOLONE ACETONIDE SCH APPLIC: 5 CREAM TOPICAL at 20:40

## 2018-04-11 RX ADMIN — SODIUM CHLORIDE, PRESERVATIVE FREE SCH ML: 5 INJECTION INTRAVENOUS at 14:23

## 2018-04-11 RX ADMIN — FORMOTEROL FUMARATE DIHYDRATE SCH MCG: 20 SOLUTION RESPIRATORY (INHALATION) at 20:38

## 2018-04-11 NOTE — PROVIDER PROGRESS NOTE
Subjective





- Prog Note Date


Prog Note Date: 04/11/18


Prog Note Time: 12:55





- Subjective


Pt reports feeling: Improved (The patient says that he is much less short of 

breath today.  He is breathing easily on room air at this time although he says 

he took his supplemental oxygen off only a few minutes ago.  He has been up 

ambulating in the room, and also took a shower in the bathroom today.)





Current Medications





- Current Medications


Current Medications: 





Active Medications





Albuterol/Ipratropium (Duoneb)  3 ml INH RTQID [facility]


   Last Admin: 04/11/18 15:38 Dose:  3 ml


Budesonide (Pulmicort)  0.5 mg INH RTBID [facility]


   Last Admin: 04/11/18 07:44 Dose:  0.5 mg


Formoterol Fumarate (Perforomist)  20 mcg INH RTBID [facility]


   Last Admin: 04/11/18 07:44 Dose:  20 mcg


Furosemide (Lasix)  40 mg PO DAILY [facility]


   Last Admin: 04/11/18 08:56 Dose:  40 mg


Gabapentin (Neurontin)  300 mg PO BID [facility]


   Last Admin: 04/11/18 08:55 Dose:  300 mg


Lisinopril (Zestril)  20 mg PO DAILY [facility]


   Last Admin: 04/11/18 08:56 Dose:  20 mg


Metoprolol Succinate (Toprol Xl)  25 mg PO DAILY [facility]


   Last Admin: 04/11/18 08:55 Dose:  25 mg


Miconazole (Aloe Vesta 2% Oint)  1 applic TOP BID [facility]


   Last Admin: 04/11/18 09:55 Dose:  1 applic


Oxycodone HCl (Roxicodone)  5 mg PO Q4HR PRN


   PRN Reason: Pain 5 to 7


   Last Admin: 04/11/18 14:18 Dose:  5 mg


Polyethylene Glycol (Miralax)  17 gm PO DAILY [facility]


   Last Admin: 04/11/18 09:13 Dose:  Not Given


Prednisone (Deltasone)  20 mg PO DAILYWM [facility]


   Last Admin: 04/11/18 08:56 Dose:  20 mg


Prochlorperazine Edisylate (Compazine Inj)  10 mg IVP Q6HR PRN


   PRN Reason: Nausea / Vomiting


Sodium Chloride (Normal Saline Flush 0.9%)  10 ml IVP PRN PRN


   PRN Reason: AS NEEDED PER PROVIDER ORDERS


Sodium Chloride (Normal Saline Flush 0.9%)  10 ml IVP 0100,0900,1700 [facility]


   Last Admin: 04/11/18 14:23 Dose:  10 ml


Temazepam (Restoril)  15 mg PO QPM PRN


   PRN Reason: Insomnia


Triamcinolone Acetonide (Kenalog 0.5% Cream)  1 applic TOP BID [facility]


   Last Admin: 04/11/18 14:04 Dose:  1 applic





 





Ibuprofen 800 mg PO Q8H PRN 04/25/17 


Diazepam [Diazepam] 2 mg PO QPM PRN 04/10/18 


Lisinopril [Lisinopril] 20 mg PO DAILY 04/10/18 


Mometasone/Formoterol [Dulera 100 Mcg/5 Mcg Inhaler] 2 puffs INH BID 04/10/18 











Objective





- Vital Signs/Intake & Output


Reviewed Vital Signs: Yes


Vital Signs: 


 Vital Signs x48h











  Temp Pulse Pulse Resp BP BP Pulse Ox


 


 04/11/18 15:38   75   18   


 


 04/11/18 15:23  36.8 C   81  19  96/66   94


 


 04/11/18 12:56  36.6 C   91  19   89/55 L  92


 


 04/11/18 11:57   90   16   


 


 04/11/18 09:39  36.8 C   97  20   95/59 L  90 L











Intake & Output: 


 Intake & Output











 04/08/18 04/09/18 04/10/18 04/11/18





 23:59 23:59 23:59 23:59


 


Intake Total   300 2300


 


Balance   300 2300














- Objective


General Appearance: positive: No acute distress, Alert


Eyes Bilateral: positive: Normal inspection, PERRL, EOMI, No lid inflammation, 

Conjunctivae nml, No scleral icterus


ENT: positive: ENT inspection nml, Pharynx nml, No signs of dehydration


Neck: positive: Nml inspection, Thyroid nml, No JVD, Trachea midline.  negative

: Thyromegaly


Respiratory: positive: Chest non-tender, No respiratory distress, Wheezes (End 

expiratory).  negative: Rales, Rhonchi


Cardiovascular: positive: Regular rate & rhythm, No murmur, No gallop


Abdomen: positive: Non-tender, No organomegaly, Nml bowel sounds, No 

distention.  negative: Guarding, Rebound


Back: positive: Nml inspection.  negative: CVA tenderness (R), CVA tenderness (L

)


Skin: positive: Color nml, No rash, Warm, Dry.  negative: Cyanosis


Extremities: positive: Non-tender, Full ROM, Nml appearance, Pedal edema


Neurologic/Psychiatric: positive: Oriented x3, CN's nml (2-12), Motor nml, 

Sensation nml, Mood/affect nml





- Lab Results


Fish Bones: 


 04/11/18 07:56





 04/11/18 07:56


Other Labs: 


 Lab Results x24hrs











  04/11/18 04/11/18 Range/Units





  07:56 07:56 


 


WBC   8.1  (4.8-10.8)  x10^3/uL


 


RBC   4.67 L  (4.70-6.10)  10^6/uL


 


Hgb   14.0  (14.0-18.0)  g/dL


 


Hct   43.6  (42.0-52.0)  %


 


MCV   93.5  (80.0-94.0)  fL


 


MCH   30.0  (27.0-31.0)  pg


 


MCHC   32.1  (32.0-36.0)  g/dL


 


RDW   15.4 H  (12.0-15.0)  %


 


Plt Count   338  (130-450)  10^3/uL


 


MPV   7.5  (7.4-11.4)  fL


 


Sodium  134 L   (135-145)  mmol/L


 


Potassium  5.6 H   (3.5-5.0)  mmol/L


 


Chloride  92 L   (101-111)  mmol/L


 


Carbon Dioxide  35 H   (21-32)  mmol/L


 


Anion Gap  7.0   (6-13)  


 


BUN  40 H   (6-20)  mg/dL


 


Creatinine  1.0   (0.6-1.2)  mg/dL


 


Estimated GFR (MDRD)  75 L   (>89)  


 


Glucose  143 H   ()  mg/dL


 


Calcium  8.3 L   (8.5-10.3)  mg/dL














- Diagnostic Imaging


Diagnostic Imaging Results: positive: Final report reviewed


Diagnostic Imaging Comments: 





EXAM: 


CHEST RADIOGRAPHY 





EXAM DATE: 4/10/2018 12:50 PM. 





CLINICAL HISTORY: Short of breath wheezing. 





COMPARISON: 08/18/2017 





TECHNIQUE: 1 view. 





FINDINGS: 


Lungs/Pleura: No focal opacities evident. No pleural effusion. No pneumothorax. 





Mediastinum: Within exam limitations, the cardiomediastinal contour is normal. 





Other: None. 





IMPRESSION: Negative portable chest. 











Assessment/Plan





- Problem List


(1) Acute exacerbation of chronic obstructive pulmonary disease (COPD)


Impression: 


The patient is responding well to the bronchodilators and steroids.  He is 

breathing easily on room air although he is using some supplemental oxygen at 

times during the day.  Continue present care.








(2) CHF, acute


Impression: 


The patient is currently being diuresed.  He is breathing easily and ambulating 

without difficulty.  Continue present care.


Qualifiers: 


   Heart failure type: unspecified   Qualified Code(s): I50.9 - Heart failure, 

unspecified   





(3) Seborrheic dermatitis


Impression: 


The patient's face is not flaking today.  He appears more comfortable.  

Continue steroid cream and antifungal creams.

## 2018-04-12 VITALS — DIASTOLIC BLOOD PRESSURE: 69 MMHG | SYSTOLIC BLOOD PRESSURE: 109 MMHG

## 2018-04-12 LAB
ANION GAP SERPL CALCULATED.4IONS-SCNC: 4 MMOL/L (ref 6–13)
BUN SERPL-MCNC: 52 MG/DL (ref 6–20)
CALCIUM UR-MCNC: 8.1 MG/DL (ref 8.5–10.3)
CHLORIDE SERPL-SCNC: 94 MMOL/L (ref 101–111)
CO2 SERPL-SCNC: 36 MMOL/L (ref 21–32)
CREAT SERPLBLD-SCNC: 1.1 MG/DL (ref 0.6–1.2)
GFRSERPLBLD MDRD-ARVRAT: 67 ML/MIN/{1.73_M2} (ref 89–?)
GLUCOSE SERPL-MCNC: 86 MG/DL (ref 70–100)
SODIUM SERPLBLD-SCNC: 134 MMOL/L (ref 135–145)

## 2018-04-12 RX ADMIN — POLYETHYLENE GLYCOL 3350 SCH: 17 POWDER, FOR SOLUTION ORAL at 08:04

## 2018-04-12 RX ADMIN — OXYCODONE PRN MG: 5 TABLET ORAL at 14:17

## 2018-04-12 RX ADMIN — NICOTINE SCH: 21 PATCH TRANSDERMAL at 12:26

## 2018-04-12 RX ADMIN — METOPROLOL SUCCINATE SCH MG: 25 TABLET, EXTENDED RELEASE ORAL at 08:02

## 2018-04-12 RX ADMIN — NICOTINE SCH PATCH: 21 PATCH TRANSDERMAL at 08:02

## 2018-04-12 RX ADMIN — GABAPENTIN SCH MG: 300 CAPSULE ORAL at 08:02

## 2018-04-12 RX ADMIN — MICONAZOLE NITRATE SCH APPLIC: 2 OINTMENT TOPICAL at 08:06

## 2018-04-12 RX ADMIN — TRIAMCINOLONE ACETONIDE SCH APPLIC: 5 CREAM TOPICAL at 06:51

## 2018-04-12 RX ADMIN — IPRATROPIUM BROMIDE AND ALBUTEROL SULFATE SCH ML: 2.5; .5 SOLUTION RESPIRATORY (INHALATION) at 07:35

## 2018-04-12 RX ADMIN — LISINOPRIL SCH MG: 20 TABLET ORAL at 08:02

## 2018-04-12 RX ADMIN — SODIUM CHLORIDE, PRESERVATIVE FREE SCH ML: 5 INJECTION INTRAVENOUS at 08:05

## 2018-04-12 RX ADMIN — OXYCODONE PRN MG: 5 TABLET ORAL at 12:24

## 2018-04-12 RX ADMIN — IPRATROPIUM BROMIDE AND ALBUTEROL SULFATE SCH: 2.5; .5 SOLUTION RESPIRATORY (INHALATION) at 11:02

## 2018-04-12 RX ADMIN — FORMOTEROL FUMARATE DIHYDRATE SCH MCG: 20 SOLUTION RESPIRATORY (INHALATION) at 07:40

## 2018-04-12 RX ADMIN — IPRATROPIUM BROMIDE AND ALBUTEROL SULFATE SCH ML: 2.5; .5 SOLUTION RESPIRATORY (INHALATION) at 07:40

## 2018-04-12 RX ADMIN — SODIUM CHLORIDE, PRESERVATIVE FREE SCH ML: 5 INJECTION INTRAVENOUS at 03:00

## 2018-04-12 RX ADMIN — OXYCODONE PRN MG: 5 TABLET ORAL at 06:27

## 2018-04-12 RX ADMIN — FUROSEMIDE SCH: 40 TABLET ORAL at 08:02

## 2018-04-12 RX ADMIN — BUDESONIDE SCH MG: 0.5 SUSPENSION RESPIRATORY (INHALATION) at 07:40

## 2018-04-12 NOTE — DISCHARGE SUMMARY
Discharge Summary


Admit Date: 04/10/18


Discharge Date: 04/12/18


Discharging Provider: Dory Ingram DO


Primary Care Provider: Nathaly Emerson


Code Status: Attempt Resuscitation


Condition at Discharge: Stable


Discharge Disposition: 01 Home, Self Care





- DIAGNOSES


Admission Diagnoses: 





1. Acute exacerbation of COPD 


2. Congestive heart failure 


3. Seborrheic dermatitis


Discharge Diagnoses with Status of Each Condition: 





1. Acute exacerbation of COPD- The patient has responded well to the 

bronchodilators and steroids.  His shortness of breath is minimal at this time.

  He has likely also responded very well to not smoking.  I will discharge him 

home with a nicotine patch and instructions to continue with his smoking 

cessation.


2. Congestive heart failure- The patient has diuresed well and his BNP has come 

down from 418-155.The patient has diuresed well and his BNP has come down from 

418-155. 


3. Seborrheic dermatitis- The patient appears to be responding well to the 

antifungals and topical steroids.  Continue present care at home and follow-up 

with dermatology.





- HPI


History of Present Illness: 





Mr. Thomas Vasquez is a pleasant 62-year-old male with a history of COPD and 

hypertension who has been having increasing shortness of breath over the last 4 

days.  The patient admits that he has been smoking about half pack of 

cigarettes a day during this time and although he is trying to quit he is 

unable to stop smoking.  He has been experiencing some weakness and decreased 

appetite as well. The patient also had an episode last year where he had 

replacement of his hip and developed a DVT which further developed into a 

pulmonary emboli.  He presented to the [facility] emergency department today 

and was found to have an elevated BNP of 425.  He is short of breath on room 

air but is has oxygen saturations in the mid 90s on 4 L.  He denies any home 

oxygen use but does use home nebulizers.  Chest x-ray was performed which was 

essentially negative but may have shown some pulmonary vascular congestion.  He 

is admitted to a medical surgical bed on telemetry with diagnosis of acute 

exacerbation of COPD and acute exacerbation of CHF.





- HOSPITAL COURSE


Hospital Course: 





The patient was admitted to medical bed on telemetry and responded quickly, 

with his shortness of breath improving almost immediately.  He has since been 

ambulating in the hallways without supplemental oxygen and has taken to 

showers.  He appears to be comfortable and the symptoms appear to be fairly well

-managed and he says he is back to his baseline.He will be discharged home on 

nebulizers and diuretics.





- ALLERGIES


Allergies/Adverse Reactions: 


 Allergies











Allergy/AdvReac Type Severity Reaction Status Date / Time


 


No Known Drug Allergies Allergy   Verified 08/17/17 15:21














- MEDICATIONS


Home Medications: 


 Ambulatory Orders











 Medication  Instructions  Recorded  Confirmed


 


Ibuprofen 800 mg PO Q8H PRN 04/25/17 04/10/18


 


Albuterol Sulfate [Proair Hfa 1 - 2 puffs INH Q4H PRN #1 inhaler 04/28/17 04/10/

18





Inhaler]   


 


Diazepam 2 mg PO QPM PRN 04/10/18 04/10/18


 


Lisinopril 20 mg PO DAILY 04/10/18 04/10/18


 


Mometasone/Formoterol [Dulera 100 2 puffs INH BID 04/10/18 04/10/18





Mcg/5 Mcg Inhaler]   


 


Furosemide [Lasix] 40 mg PO DAILY #15 tablet 04/12/18 


 


Gabapentin [Neurontin] 300 mg PO BID  capsule 04/12/18 


 


Ipratropium/Albuterol [Duoneb] 3 ml INH RTQID  neb 04/12/18 














- PHYSICAL EXAM AT DISCHARGE


General Appearance: positive: No acute distress, Alert


Eyes Bilateral: positive: Normal inspection, PERRL, EOMI, No lid inflammation, 

Conjunctivae nml, No scleral icterus


ENT: positive: ENT inspection nml, Pharynx nml, No signs of dehydration


Neck: positive: Nml inspection, Thyroid nml, No JVD, Trachea midline.  negative

: Thyromegaly


Respiratory: positive: Chest non-tender, No respiratory distress, Breath sounds 

nml.  negative: Wheezes, Rales, Rhonchi


Cardiovascular: positive: Regular rate & rhythm, No murmur, No gallop


Peripheral Pulses: positive: 1+


Abdomen: positive: Non-tender, No organomegaly, Nml bowel sounds, No 

distention.  negative: Guarding, Rebound


Back: positive: Nml inspection.  negative: CVA tenderness (R), CVA tenderness (L

)


Skin: positive: Color nml, No rash, Warm, Dry.  negative: Cyanosis


Extremities: positive: Non-tender, Full ROM, Nml appearance


Neurologic/Psychiatric: positive: Oriented x3, CN's nml (2-12), Motor nml, 

Sensation nml, Mood/affect nml





- LABS


Result Diagrams: 


 04/11/18 07:56





 04/12/18 04:56





- DIAGNOSTIC IMAGING


Diagnostic Imaging Results: Final report reviewed


Diagnostic Imaging Results Comments: 








EXAM: 


CHEST RADIOGRAPHY 





EXAM DATE: 4/10/2018 12:50 PM. 





CLINICAL HISTORY: Short of breath wheezing. 





COMPARISON: 08/18/2017 





TECHNIQUE: 1 view. 





FINDINGS: 


Lungs/Pleura: No focal opacities evident. No pleural effusion. No pneumothorax. 





Mediastinum: Within exam limitations, the cardiomediastinal contour is normal. 





Other: None. 





IMPRESSION: Negative portable chest. 








EXAM: 


CHEST RADIOGRAPHY 





EXAM DATE: 4/12/2018 06:23 AM. 





CLINICAL HISTORY: Shortness of breath. 





COMPARISON: 04/10/2018. 08/18/2017. 





TECHNIQUE: 2 views. 





FINDINGS: 


Lungs/Pleura: Mild vascular congestion. Elevated right hemidiaphragm. Patchy 

bibasilar opacities 


with progression in the right lung base. No pneumothorax. 





Mediastinum: Heart size and mediastinal contour are stable. 





Other: Degenerative changes of the thoracic spine. Marked compression deformity 

of a midthoracic 


vertebral body. Healed left-sided rib fractures. 





IMPRESSION: 


1. Mild vascular congestion. 


2. Progression of right greater than left bibasilar consolidation/atelectasis. 














- FOLLOW UP


Follow Up: 





Follow-up with your primary care provider and cardiologist next week.





- TIME SPENT


Time Spent in Discharge (Minutes): 40

## 2018-04-12 NOTE — XRAY REPORT
EXAM:

CHEST RADIOGRAPHY

 

EXAM DATE: 4/12/2018 06:23 AM.

 

CLINICAL HISTORY: Shortness of breath.

 

COMPARISON: 04/10/2018. 08/18/2017.

 

TECHNIQUE: 2 views.

 

FINDINGS: 

Lungs/Pleura: Mild vascular congestion. Elevated right hemidiaphragm. Patchy bibasilar opacities with
 progression in the right lung base. No pneumothorax.

 

Mediastinum: Heart size and mediastinal contour are stable.

 

Other: Degenerative changes of the thoracic spine. Marked compression deformity of a midthoracic vert
ebral body. Healed left-sided rib fractures.

 

IMPRESSION: 

1. Mild vascular congestion. 

2. Progression of right greater than left bibasilar consolidation/atelectasis.

 

[facility]

Referring Provider Line: [phone]

 

SITE ID: [id number]

## 2018-04-19 ENCOUNTER — HOSPITAL ENCOUNTER (OUTPATIENT)
Dept: HOSPITAL 76 - EMS | Age: 63
End: 2018-04-19
Attending: SURGERY
Payer: MEDICAID

## 2022-04-26 NOTE — DISCHARGE PLAN
Discharge Plan


Disposition: 01 Home, Self Care


Condition: Stable


Prescriptions: 


Furosemide [Lasix] 40 mg PO DAILY #15 tablet


Diet: Cardiac


Activity Restrictions: Activity as Tolerated


Shower Restrictions: No


Driving Restrictions: No


Weight Bearing: Full Weight


Additional Instructions or Follow Up instructions: 


Follow-up with your primary care physician and a cardiologist in the next week.


No Smoking: If you smoke, Please STOP!  Call [phone] for help.


Follow-up with: 


Nathaly Emerson ARNP [Primary Care Provider] -
No